# Patient Record
Sex: FEMALE | Race: WHITE | NOT HISPANIC OR LATINO
[De-identification: names, ages, dates, MRNs, and addresses within clinical notes are randomized per-mention and may not be internally consistent; named-entity substitution may affect disease eponyms.]

---

## 2017-01-05 ENCOUNTER — APPOINTMENT (OUTPATIENT)
Dept: PSYCHIATRY | Facility: CLINIC | Age: 62
End: 2017-01-05

## 2017-01-12 ENCOUNTER — APPOINTMENT (OUTPATIENT)
Dept: PSYCHIATRY | Facility: CLINIC | Age: 62
End: 2017-01-12

## 2017-01-23 ENCOUNTER — RX RENEWAL (OUTPATIENT)
Age: 62
End: 2017-01-23

## 2017-01-26 ENCOUNTER — APPOINTMENT (OUTPATIENT)
Dept: PSYCHIATRY | Facility: CLINIC | Age: 62
End: 2017-01-26

## 2017-02-02 ENCOUNTER — APPOINTMENT (OUTPATIENT)
Dept: PSYCHIATRY | Facility: CLINIC | Age: 62
End: 2017-02-02

## 2017-02-09 ENCOUNTER — APPOINTMENT (OUTPATIENT)
Dept: PSYCHIATRY | Facility: CLINIC | Age: 62
End: 2017-02-09

## 2017-02-16 ENCOUNTER — APPOINTMENT (OUTPATIENT)
Dept: PSYCHIATRY | Facility: CLINIC | Age: 62
End: 2017-02-16

## 2017-03-02 ENCOUNTER — APPOINTMENT (OUTPATIENT)
Dept: PSYCHIATRY | Facility: CLINIC | Age: 62
End: 2017-03-02

## 2017-03-09 ENCOUNTER — APPOINTMENT (OUTPATIENT)
Dept: PSYCHIATRY | Facility: CLINIC | Age: 62
End: 2017-03-09

## 2017-03-16 ENCOUNTER — APPOINTMENT (OUTPATIENT)
Dept: PSYCHIATRY | Facility: CLINIC | Age: 62
End: 2017-03-16

## 2017-03-30 ENCOUNTER — APPOINTMENT (OUTPATIENT)
Dept: PSYCHIATRY | Facility: CLINIC | Age: 62
End: 2017-03-30

## 2017-03-31 ENCOUNTER — APPOINTMENT (OUTPATIENT)
Dept: ULTRASOUND IMAGING | Facility: CLINIC | Age: 62
End: 2017-03-31

## 2017-03-31 ENCOUNTER — OUTPATIENT (OUTPATIENT)
Dept: OUTPATIENT SERVICES | Facility: HOSPITAL | Age: 62
LOS: 1 days | End: 2017-03-31
Payer: COMMERCIAL

## 2017-03-31 DIAGNOSIS — R19.4 CHANGE IN BOWEL HABIT: ICD-10-CM

## 2017-03-31 DIAGNOSIS — J20.9 ACUTE BRONCHITIS, UNSPECIFIED: ICD-10-CM

## 2017-03-31 DIAGNOSIS — K30 FUNCTIONAL DYSPEPSIA: ICD-10-CM

## 2017-03-31 PROCEDURE — 76700 US EXAM ABDOM COMPLETE: CPT

## 2017-04-14 ENCOUNTER — OUTPATIENT (OUTPATIENT)
Dept: OUTPATIENT SERVICES | Facility: HOSPITAL | Age: 62
LOS: 1 days | End: 2017-04-14
Payer: COMMERCIAL

## 2017-04-14 ENCOUNTER — APPOINTMENT (OUTPATIENT)
Dept: CT IMAGING | Facility: CLINIC | Age: 62
End: 2017-04-14

## 2017-04-14 DIAGNOSIS — R19.4 CHANGE IN BOWEL HABIT: ICD-10-CM

## 2017-04-14 PROCEDURE — 74150 CT ABDOMEN W/O CONTRAST: CPT

## 2017-04-20 ENCOUNTER — APPOINTMENT (OUTPATIENT)
Dept: PSYCHIATRY | Facility: CLINIC | Age: 62
End: 2017-04-20

## 2017-04-26 ENCOUNTER — RESULT REVIEW (OUTPATIENT)
Age: 62
End: 2017-04-26

## 2017-04-27 ENCOUNTER — RESULT REVIEW (OUTPATIENT)
Age: 62
End: 2017-04-27

## 2017-04-27 ENCOUNTER — APPOINTMENT (OUTPATIENT)
Dept: PSYCHIATRY | Facility: CLINIC | Age: 62
End: 2017-04-27

## 2017-05-04 ENCOUNTER — APPOINTMENT (OUTPATIENT)
Dept: PSYCHIATRY | Facility: CLINIC | Age: 62
End: 2017-05-04

## 2017-05-08 ENCOUNTER — RX RENEWAL (OUTPATIENT)
Age: 62
End: 2017-05-08

## 2017-05-11 ENCOUNTER — APPOINTMENT (OUTPATIENT)
Dept: PSYCHIATRY | Facility: CLINIC | Age: 62
End: 2017-05-11

## 2017-05-11 DIAGNOSIS — Z91.19 PATIENT'S NONCOMPLIANCE WITH OTHER MEDICAL TREATMENT AND REGIMEN: ICD-10-CM

## 2017-05-18 ENCOUNTER — APPOINTMENT (OUTPATIENT)
Dept: PSYCHIATRY | Facility: CLINIC | Age: 62
End: 2017-05-18

## 2017-05-23 ENCOUNTER — OUTPATIENT (OUTPATIENT)
Dept: OUTPATIENT SERVICES | Facility: HOSPITAL | Age: 62
LOS: 1 days | End: 2017-05-23
Payer: COMMERCIAL

## 2017-05-23 ENCOUNTER — APPOINTMENT (OUTPATIENT)
Dept: MRI IMAGING | Facility: CLINIC | Age: 62
End: 2017-05-23

## 2017-05-23 DIAGNOSIS — Z00.8 ENCOUNTER FOR OTHER GENERAL EXAMINATION: ICD-10-CM

## 2017-05-23 PROCEDURE — 82565 ASSAY OF CREATININE: CPT

## 2017-05-25 ENCOUNTER — APPOINTMENT (OUTPATIENT)
Dept: PSYCHIATRY | Facility: CLINIC | Age: 62
End: 2017-05-25

## 2017-05-31 ENCOUNTER — APPOINTMENT (OUTPATIENT)
Dept: MRI IMAGING | Facility: CLINIC | Age: 62
End: 2017-05-31

## 2017-05-31 ENCOUNTER — OUTPATIENT (OUTPATIENT)
Dept: OUTPATIENT SERVICES | Facility: HOSPITAL | Age: 62
LOS: 1 days | End: 2017-05-31
Payer: COMMERCIAL

## 2017-05-31 DIAGNOSIS — Z00.8 ENCOUNTER FOR OTHER GENERAL EXAMINATION: ICD-10-CM

## 2017-05-31 PROCEDURE — A9585: CPT

## 2017-05-31 PROCEDURE — 74183 MRI ABD W/O CNTR FLWD CNTR: CPT

## 2017-06-15 ENCOUNTER — APPOINTMENT (OUTPATIENT)
Dept: PSYCHIATRY | Facility: CLINIC | Age: 62
End: 2017-06-15

## 2017-06-22 ENCOUNTER — APPOINTMENT (OUTPATIENT)
Dept: PSYCHIATRY | Facility: CLINIC | Age: 62
End: 2017-06-22

## 2017-07-06 ENCOUNTER — APPOINTMENT (OUTPATIENT)
Dept: PSYCHIATRY | Facility: CLINIC | Age: 62
End: 2017-07-06

## 2017-08-03 ENCOUNTER — APPOINTMENT (OUTPATIENT)
Dept: PSYCHIATRY | Facility: CLINIC | Age: 62
End: 2017-08-03

## 2017-08-10 ENCOUNTER — APPOINTMENT (OUTPATIENT)
Dept: PSYCHIATRY | Facility: CLINIC | Age: 62
End: 2017-08-10

## 2017-09-01 ENCOUNTER — RX RENEWAL (OUTPATIENT)
Age: 62
End: 2017-09-01

## 2017-09-14 ENCOUNTER — APPOINTMENT (OUTPATIENT)
Dept: PSYCHIATRY | Facility: CLINIC | Age: 62
End: 2017-09-14
Payer: COMMERCIAL

## 2017-09-14 DIAGNOSIS — F31.9 BIPOLAR DISORDER, UNSPECIFIED: ICD-10-CM

## 2017-09-14 PROCEDURE — 99214 OFFICE O/P EST MOD 30 MIN: CPT

## 2017-10-13 ENCOUNTER — APPOINTMENT (OUTPATIENT)
Dept: MAMMOGRAPHY | Facility: CLINIC | Age: 62
End: 2017-10-13

## 2017-10-13 ENCOUNTER — APPOINTMENT (OUTPATIENT)
Dept: CT IMAGING | Facility: CLINIC | Age: 62
End: 2017-10-13

## 2017-10-13 ENCOUNTER — OUTPATIENT (OUTPATIENT)
Dept: OUTPATIENT SERVICES | Facility: HOSPITAL | Age: 62
LOS: 1 days | End: 2017-10-13
Payer: COMMERCIAL

## 2017-10-13 DIAGNOSIS — Z00.8 ENCOUNTER FOR OTHER GENERAL EXAMINATION: ICD-10-CM

## 2017-10-13 PROCEDURE — G0297: CPT

## 2017-10-13 PROCEDURE — G0297: CPT | Mod: 26

## 2017-12-04 ENCOUNTER — APPOINTMENT (OUTPATIENT)
Dept: MRI IMAGING | Facility: CLINIC | Age: 62
End: 2017-12-04

## 2017-12-04 ENCOUNTER — APPOINTMENT (OUTPATIENT)
Dept: MAMMOGRAPHY | Facility: CLINIC | Age: 62
End: 2017-12-04

## 2017-12-04 ENCOUNTER — OUTPATIENT (OUTPATIENT)
Dept: OUTPATIENT SERVICES | Facility: HOSPITAL | Age: 62
LOS: 1 days | End: 2017-12-04
Payer: COMMERCIAL

## 2017-12-04 ENCOUNTER — APPOINTMENT (OUTPATIENT)
Dept: ULTRASOUND IMAGING | Facility: CLINIC | Age: 62
End: 2017-12-04

## 2017-12-04 DIAGNOSIS — Z00.00 ENCOUNTER FOR GENERAL ADULT MEDICAL EXAMINATION WITHOUT ABNORMAL FINDINGS: ICD-10-CM

## 2017-12-04 PROCEDURE — 77063 BREAST TOMOSYNTHESIS BI: CPT

## 2017-12-04 PROCEDURE — 77063 BREAST TOMOSYNTHESIS BI: CPT | Mod: 26

## 2017-12-04 PROCEDURE — 77067 SCR MAMMO BI INCL CAD: CPT

## 2017-12-04 PROCEDURE — 76641 ULTRASOUND BREAST COMPLETE: CPT | Mod: 26,50

## 2017-12-04 PROCEDURE — 70551 MRI BRAIN STEM W/O DYE: CPT | Mod: 26

## 2017-12-04 PROCEDURE — 70551 MRI BRAIN STEM W/O DYE: CPT

## 2017-12-04 PROCEDURE — G0202: CPT | Mod: 26

## 2017-12-04 PROCEDURE — 76641 ULTRASOUND BREAST COMPLETE: CPT

## 2018-07-25 ENCOUNTER — APPOINTMENT (OUTPATIENT)
Dept: PSYCHIATRY | Facility: CLINIC | Age: 63
End: 2018-07-25

## 2018-08-30 ENCOUNTER — APPOINTMENT (OUTPATIENT)
Dept: PSYCHIATRY | Facility: CLINIC | Age: 63
End: 2018-08-30
Payer: COMMERCIAL

## 2018-08-30 PROCEDURE — 99215 OFFICE O/P EST HI 40 MIN: CPT

## 2018-09-18 ENCOUNTER — APPOINTMENT (OUTPATIENT)
Dept: PSYCHIATRY | Facility: CLINIC | Age: 63
End: 2018-09-18
Payer: COMMERCIAL

## 2018-09-18 PROCEDURE — 99214 OFFICE O/P EST MOD 30 MIN: CPT

## 2018-10-03 ENCOUNTER — INPATIENT (INPATIENT)
Facility: HOSPITAL | Age: 63
LOS: 15 days | Discharge: HOME | DRG: 872 | End: 2018-10-19
Attending: INTERNAL MEDICINE | Admitting: INTERNAL MEDICINE
Payer: COMMERCIAL

## 2018-10-03 VITALS
HEART RATE: 82 BPM | HEIGHT: 70 IN | SYSTOLIC BLOOD PRESSURE: 96 MMHG | OXYGEN SATURATION: 100 % | RESPIRATION RATE: 17 BRPM | TEMPERATURE: 98 F | DIASTOLIC BLOOD PRESSURE: 63 MMHG | WEIGHT: 119.05 LBS

## 2018-10-03 DIAGNOSIS — N12 TUBULO-INTERSTITIAL NEPHRITIS, NOT SPECIFIED AS ACUTE OR CHRONIC: ICD-10-CM

## 2018-10-03 LAB
ALBUMIN SERPL ELPH-MCNC: 3.5 G/DL — SIGNIFICANT CHANGE UP (ref 3.3–5)
ALP SERPL-CCNC: 63 U/L — SIGNIFICANT CHANGE UP (ref 40–120)
ALT FLD-CCNC: 7 U/L — LOW (ref 10–45)
ANION GAP SERPL CALC-SCNC: 9 MMOL/L — SIGNIFICANT CHANGE UP (ref 5–17)
APPEARANCE UR: CLEAR — SIGNIFICANT CHANGE UP
APTT BLD: 33.8 SEC — SIGNIFICANT CHANGE UP (ref 27.5–37.4)
AST SERPL-CCNC: 6 U/L — LOW (ref 10–40)
BACTERIA # UR AUTO: ABNORMAL
BASOPHILS # BLD AUTO: 0.1 K/UL — SIGNIFICANT CHANGE UP (ref 0–0.2)
BASOPHILS NFR BLD AUTO: 0.8 % — SIGNIFICANT CHANGE UP (ref 0–2)
BILIRUB SERPL-MCNC: 0.2 MG/DL — SIGNIFICANT CHANGE UP (ref 0.2–1.2)
BILIRUB UR-MCNC: NEGATIVE — SIGNIFICANT CHANGE UP
BUN SERPL-MCNC: 12 MG/DL — SIGNIFICANT CHANGE UP (ref 7–23)
CALCIUM SERPL-MCNC: 9.1 MG/DL — SIGNIFICANT CHANGE UP (ref 8.4–10.5)
CHLORIDE SERPL-SCNC: 103 MMOL/L — SIGNIFICANT CHANGE UP (ref 96–108)
CO2 SERPL-SCNC: 28 MMOL/L — SIGNIFICANT CHANGE UP (ref 22–31)
COLOR SPEC: SIGNIFICANT CHANGE UP
CREAT SERPL-MCNC: 0.86 MG/DL — SIGNIFICANT CHANGE UP (ref 0.5–1.3)
DIFF PNL FLD: NEGATIVE — SIGNIFICANT CHANGE UP
EOSINOPHIL # BLD AUTO: 0 K/UL — SIGNIFICANT CHANGE UP (ref 0–0.5)
EOSINOPHIL NFR BLD AUTO: 0.6 % — SIGNIFICANT CHANGE UP (ref 0–6)
EPI CELLS # UR: 2 /HPF — SIGNIFICANT CHANGE UP
GAS PNL BLDV: SIGNIFICANT CHANGE UP
GLUCOSE SERPL-MCNC: 105 MG/DL — HIGH (ref 70–99)
GLUCOSE UR QL: NEGATIVE — SIGNIFICANT CHANGE UP
HCT VFR BLD CALC: 36.8 % — SIGNIFICANT CHANGE UP (ref 34.5–45)
HGB BLD-MCNC: 12.2 G/DL — SIGNIFICANT CHANGE UP (ref 11.5–15.5)
HYALINE CASTS # UR AUTO: 1 /LPF — SIGNIFICANT CHANGE UP (ref 0–2)
INR BLD: 1.42 RATIO — HIGH (ref 0.88–1.16)
KETONES UR-MCNC: NEGATIVE — SIGNIFICANT CHANGE UP
LEUKOCYTE ESTERASE UR-ACNC: ABNORMAL
LYMPHOCYTES # BLD AUTO: 1.8 K/UL — SIGNIFICANT CHANGE UP (ref 1–3.3)
LYMPHOCYTES # BLD AUTO: 23 % — SIGNIFICANT CHANGE UP (ref 13–44)
MCHC RBC-ENTMCNC: 29.4 PG — SIGNIFICANT CHANGE UP (ref 27–34)
MCHC RBC-ENTMCNC: 33.2 GM/DL — SIGNIFICANT CHANGE UP (ref 32–36)
MCV RBC AUTO: 88.4 FL — SIGNIFICANT CHANGE UP (ref 80–100)
MONOCYTES # BLD AUTO: 0.8 K/UL — SIGNIFICANT CHANGE UP (ref 0–0.9)
MONOCYTES NFR BLD AUTO: 10.5 % — SIGNIFICANT CHANGE UP (ref 2–14)
NEUTROPHILS # BLD AUTO: 5.1 K/UL — SIGNIFICANT CHANGE UP (ref 1.8–7.4)
NEUTROPHILS NFR BLD AUTO: 65.2 % — SIGNIFICANT CHANGE UP (ref 43–77)
NITRITE UR-MCNC: POSITIVE
PH UR: 7 — SIGNIFICANT CHANGE UP (ref 5–8)
PLATELET # BLD AUTO: 337 K/UL — SIGNIFICANT CHANGE UP (ref 150–400)
POTASSIUM SERPL-MCNC: 3.6 MMOL/L — SIGNIFICANT CHANGE UP (ref 3.5–5.3)
POTASSIUM SERPL-SCNC: 3.6 MMOL/L — SIGNIFICANT CHANGE UP (ref 3.5–5.3)
PROT SERPL-MCNC: 7.1 G/DL — SIGNIFICANT CHANGE UP (ref 6–8.3)
PROT UR-MCNC: NEGATIVE — SIGNIFICANT CHANGE UP
PROTHROM AB SERPL-ACNC: 15.4 SEC — HIGH (ref 9.8–12.7)
RAPID RVP RESULT: SIGNIFICANT CHANGE UP
RBC # BLD: 4.16 M/UL — SIGNIFICANT CHANGE UP (ref 3.8–5.2)
RBC # FLD: 12.2 % — SIGNIFICANT CHANGE UP (ref 10.3–14.5)
RBC CASTS # UR COMP ASSIST: 4 /HPF — SIGNIFICANT CHANGE UP (ref 0–4)
SODIUM SERPL-SCNC: 140 MMOL/L — SIGNIFICANT CHANGE UP (ref 135–145)
SP GR SPEC: 1.03 — HIGH (ref 1.01–1.02)
UROBILINOGEN FLD QL: NEGATIVE — SIGNIFICANT CHANGE UP
WBC # BLD: 7.8 K/UL — SIGNIFICANT CHANGE UP (ref 3.8–10.5)
WBC # FLD AUTO: 7.8 K/UL — SIGNIFICANT CHANGE UP (ref 3.8–10.5)
WBC UR QL: 39 /HPF — HIGH (ref 0–5)

## 2018-10-03 PROCEDURE — 99285 EMERGENCY DEPT VISIT HI MDM: CPT

## 2018-10-03 PROCEDURE — 71260 CT THORAX DX C+: CPT | Mod: 26

## 2018-10-03 PROCEDURE — 74177 CT ABD & PELVIS W/CONTRAST: CPT | Mod: 26

## 2018-10-03 RX ORDER — HEPARIN SODIUM 5000 [USP'U]/ML
5000 INJECTION INTRAVENOUS; SUBCUTANEOUS EVERY 12 HOURS
Qty: 0 | Refills: 0 | Status: DISCONTINUED | OUTPATIENT
Start: 2018-10-03 | End: 2018-10-19

## 2018-10-03 RX ORDER — SODIUM CHLORIDE 9 MG/ML
1000 INJECTION INTRAMUSCULAR; INTRAVENOUS; SUBCUTANEOUS ONCE
Qty: 0 | Refills: 0 | Status: COMPLETED | OUTPATIENT
Start: 2018-10-03 | End: 2018-10-03

## 2018-10-03 RX ORDER — ESCITALOPRAM OXALATE 10 MG/1
30 TABLET, FILM COATED ORAL DAILY
Qty: 0 | Refills: 0 | Status: DISCONTINUED | OUTPATIENT
Start: 2018-10-03 | End: 2018-10-19

## 2018-10-03 RX ORDER — CEFTRIAXONE 500 MG/1
1 INJECTION, POWDER, FOR SOLUTION INTRAMUSCULAR; INTRAVENOUS ONCE
Qty: 0 | Refills: 0 | Status: COMPLETED | OUTPATIENT
Start: 2018-10-03 | End: 2018-10-03

## 2018-10-03 RX ORDER — SODIUM CHLORIDE 9 MG/ML
1000 INJECTION INTRAMUSCULAR; INTRAVENOUS; SUBCUTANEOUS
Qty: 0 | Refills: 0 | Status: DISCONTINUED | OUTPATIENT
Start: 2018-10-03 | End: 2018-10-09

## 2018-10-03 RX ADMIN — ESCITALOPRAM OXALATE 30 MILLIGRAM(S): 10 TABLET, FILM COATED ORAL at 20:55

## 2018-10-03 RX ADMIN — SODIUM CHLORIDE 2000 MILLILITER(S): 9 INJECTION INTRAMUSCULAR; INTRAVENOUS; SUBCUTANEOUS at 17:55

## 2018-10-03 RX ADMIN — SODIUM CHLORIDE 100 MILLILITER(S): 9 INJECTION INTRAMUSCULAR; INTRAVENOUS; SUBCUTANEOUS at 20:55

## 2018-10-03 RX ADMIN — CEFTRIAXONE 100 GRAM(S): 500 INJECTION, POWDER, FOR SOLUTION INTRAMUSCULAR; INTRAVENOUS at 20:55

## 2018-10-03 NOTE — ED ADULT NURSE NOTE - NSIMPLEMENTINTERV_GEN_ALL_ED
Implemented All Universal Safety Interventions:  [place] to call system. Call bell, personal items and telephone within reach. Instruct patient to call for assistance. Room bathroom lighting operational. Non-slip footwear when patient is off stretcher. Physically safe environment: no spills, clutter or unnecessary equipment. Stretcher in lowest position, wheels locked, appropriate side rails in place.

## 2018-10-03 NOTE — ED CLERICAL - NS ED CLERK NOTE PRE-ARRIVAL INFORMATION; ADDITIONAL PRE-ARRIVAL INFORMATION
CC/Reason For referral:  SOB, HYPOTENSIVE  Preferred Consultant(if applicable):  Who admits for you (if needed):  Do you have documents you would like to fax over? NO  Would you still like to speak to an ED attending?  YES

## 2018-10-03 NOTE — ED PROVIDER NOTE - SHIFT CHANGE DETAILS
Attending MD King: 63F with PMH anxiety with 8 days of malaise, PMD Dr Merritt, hypotensive in his office, sent in for admission by Dr Berkowitz.  H&P by Dr Berkowitz.  Awaiting only CT Chest/A/P prior to admission.

## 2018-10-03 NOTE — ED ADULT NURSE NOTE - OBJECTIVE STATEMENT
Patient is a 63 year old female complaining of sob, low bp and unsteadiness. Arrived by walk-in. Patient has history of anxiety, denies other medical hx. She is a smoker. Patient is A&O x3 and appears well. Pt reports feeling off for one week with suspected sinus infection, got body aches and diaphoresis during the night with associated thirst and sob. Endorsing complaints of burning urination and urinating on herself due to urgency. Pt then developed a headache and dizziness yesterday and went to see PCP. Denies complaints of chest pain, cough, known fevers, chills, n/v/d, syncope, blood in urine, blood in stool. Abd is soft, non tender, non distended. Skin is warm and dry. Color is consistent with ethnicity. VSS/ NAD. Safety and comfort maintained. Nobody at the bedside. Will continue to monitor.

## 2018-10-03 NOTE — ED ADULT NURSE NOTE - CHIEF COMPLAINT QUOTE
SOB, dizziness, back pain and weakness x 1.5 week. felt like she had the flu went to see her PMD today and he sent her here for evaluation for SOB, sent to be admitted to [facility]

## 2018-10-03 NOTE — H&P ADULT - HISTORY OF PRESENT ILLNESS
63y femalewho comes to ed complains of feeling ill past week with body aches, sweats, chills di/no cough, nausea without vomiting.   Diarrhea last week with watery diarrhea. No gi bleeding. Pt symptoms worsened over past few days.  . Pt had syncope yesterday  . Seen today at pmd and found profoundly hypotensive. Referred to ed

## 2018-10-03 NOTE — ED PROVIDER NOTE - OBJECTIVE STATEMENT
62 yo female PMHx anxiety presents to ED c/o generalized fatigue, body aches, chills and "feeling unwell" for the last week. Additionally endorses several episodes of nausea with nb/nb vomiting this week as well as watery diarrhea with abdominal cramping. Symptoms progressively worsening this past week. Patient went to her PMD today who found her to be hypotensive and send her in to ED for workup and admission. Denies chest pain, cough, shortness of breath, recent travel/hospitalization, LE swelling, calf pain, melena, BRBPR, recent sick contacts, dizziness, neck pain.

## 2018-10-03 NOTE — ED PROVIDER NOTE - PROGRESS NOTE DETAILS
Discussed with Dr Berkowitz bedside TBA  Pankaj Gibson MD, Facep Discussed results of CT's with Dr. Berkowitz. CTAP with findings c/w pyelo vs ischemia vs infiltrative process. UA c/w infection. He recommended Ceftriaxone for pyelo and admission under his service. - Jorden Mendoza PA-C Attending MD King: Case discussed with Dr Berkowitz by FRANCES Mendoza.   CT results: heterogeneous enhancement of the kidneys bilaterally. In addition, there is mild enhancement of the urothelium in the proximal right ureter.The differential diagnosis includes infection such as pyelonephritis, ischemia, and infiltrative processes such as lymphoma and leukemia.

## 2018-10-03 NOTE — H&P ADULT - ASSESSMENT
pt w/ weakness / syncope/ hypotension chills/cvat / abd tenderness  ct c/a/p  cbc  cmp  iv fluids  dvt proph   r/o sepsis  cultures

## 2018-10-03 NOTE — ED ADULT TRIAGE NOTE - CHIEF COMPLAINT QUOTE
SOB, dizziness, back pain and weakness x 1.5 week. felt like she had the flu went to see her PMD today and he sent her here for evaluation for SOB, sent to be admitted to [facility]

## 2018-10-03 NOTE — ED PROVIDER NOTE - ATTENDING CONTRIBUTION TO CARE
Private Physician Nishant Lomeli  63y female pmh axinety on gabapentin as sleep aid, Lexapro, Pt comes to ed complains of feeling ill past 8 days with body aches, sweats, chills didn't take temp,no cough, nausea without vomiting. Diarrhea last week with watery diarrhea. No gi bleeding. Pt symptoms worsened over past few days. Usual bp is 90/60 . Pt had syncope yesterday. Seen today at pmd and found profoundly hypotensive. Referred to ed PE Adult female thin looking mildly acutely ill, NCAT chest clear anterior & posterior abd mild gen ttp no rebound guarding. +preet cvat cv no rubs, gallops or murmurs, neruo no focal defects  Pankaj Gibson MD, Facep Private Physician Nishant Lomeli    63y female  pmh axinety on gabapentin as sleep aid, Lexapro, Pt comes to ed complains of feeling ill past 8 days with body aches, sweats, chills didn't take temp,no cough, nausea without vomiting. Diarrhea last week with watery diarrhea. No gi bleeding. Pt symptoms worsened over past few days. Usual bp is 90/60 . Pt had syncope yesterday. Seen today at pmd and found profoundly hypotensive. Referred to ed PE Adult female thin looking mildly acutely ill, NCAT chest clear anterior & posterior abd mild gen ttp no rebound guarding. +preet cvat cv no rubs, gallops or murmurs, neruo no focal defects  Pankaj Gibson MD, Facep

## 2018-10-04 LAB
ALBUMIN SERPL ELPH-MCNC: 3.2 G/DL — LOW (ref 3.3–5)
ALP SERPL-CCNC: 54 U/L — SIGNIFICANT CHANGE UP (ref 40–120)
ALT FLD-CCNC: 6 U/L — LOW (ref 10–45)
ANION GAP SERPL CALC-SCNC: 8 MMOL/L — SIGNIFICANT CHANGE UP (ref 5–17)
AST SERPL-CCNC: <5 U/L — LOW (ref 10–40)
BILIRUB DIRECT SERPL-MCNC: <0.1 MG/DL — SIGNIFICANT CHANGE UP (ref 0–0.2)
BILIRUB INDIRECT FLD-MCNC: >0.1 MG/DL — LOW (ref 0.2–1)
BILIRUB SERPL-MCNC: 0.2 MG/DL — SIGNIFICANT CHANGE UP (ref 0.2–1.2)
BUN SERPL-MCNC: 8 MG/DL — SIGNIFICANT CHANGE UP (ref 7–23)
CALCIUM SERPL-MCNC: 8.8 MG/DL — SIGNIFICANT CHANGE UP (ref 8.4–10.5)
CHLORIDE SERPL-SCNC: 104 MMOL/L — SIGNIFICANT CHANGE UP (ref 96–108)
CK MB CFR SERPL CALC: 1 NG/ML — SIGNIFICANT CHANGE UP (ref 0–3.8)
CK SERPL-CCNC: 30 U/L — SIGNIFICANT CHANGE UP (ref 25–170)
CO2 SERPL-SCNC: 25 MMOL/L — SIGNIFICANT CHANGE UP (ref 22–31)
CREAT SERPL-MCNC: 0.75 MG/DL — SIGNIFICANT CHANGE UP (ref 0.5–1.3)
GLUCOSE SERPL-MCNC: 91 MG/DL — SIGNIFICANT CHANGE UP (ref 70–99)
HCT VFR BLD CALC: 33.3 % — LOW (ref 34.5–45)
HCT VFR BLD CALC: 33.9 % — LOW (ref 34.5–45)
HGB BLD-MCNC: 10.7 G/DL — LOW (ref 11.5–15.5)
HGB BLD-MCNC: 11.4 G/DL — LOW (ref 11.5–15.5)
MAGNESIUM SERPL-MCNC: 2.1 MG/DL — SIGNIFICANT CHANGE UP (ref 1.6–2.6)
MCHC RBC-ENTMCNC: 28.5 PG — SIGNIFICANT CHANGE UP (ref 27–34)
MCHC RBC-ENTMCNC: 29.6 PG — SIGNIFICANT CHANGE UP (ref 27–34)
MCHC RBC-ENTMCNC: 32.1 GM/DL — SIGNIFICANT CHANGE UP (ref 32–36)
MCHC RBC-ENTMCNC: 33.7 GM/DL — SIGNIFICANT CHANGE UP (ref 32–36)
MCV RBC AUTO: 87.9 FL — SIGNIFICANT CHANGE UP (ref 80–100)
MCV RBC AUTO: 88.6 FL — SIGNIFICANT CHANGE UP (ref 80–100)
PHOSPHATE SERPL-MCNC: 3.8 MG/DL — SIGNIFICANT CHANGE UP (ref 2.5–4.5)
PLATELET # BLD AUTO: 326 K/UL — SIGNIFICANT CHANGE UP (ref 150–400)
PLATELET # BLD AUTO: 337 K/UL — SIGNIFICANT CHANGE UP (ref 150–400)
POTASSIUM SERPL-MCNC: 3.7 MMOL/L — SIGNIFICANT CHANGE UP (ref 3.5–5.3)
POTASSIUM SERPL-SCNC: 3.7 MMOL/L — SIGNIFICANT CHANGE UP (ref 3.5–5.3)
PROT SERPL-MCNC: 6.4 G/DL — SIGNIFICANT CHANGE UP (ref 6–8.3)
RBC # BLD: 3.76 M/UL — LOW (ref 3.8–5.2)
RBC # BLD: 3.86 M/UL — SIGNIFICANT CHANGE UP (ref 3.8–5.2)
RBC # FLD: 11.8 % — SIGNIFICANT CHANGE UP (ref 10.3–14.5)
RBC # FLD: 13.6 % — SIGNIFICANT CHANGE UP (ref 10.3–14.5)
SODIUM SERPL-SCNC: 137 MMOL/L — SIGNIFICANT CHANGE UP (ref 135–145)
TROPONIN T, HIGH SENSITIVITY RESULT: <6 NG/L — SIGNIFICANT CHANGE UP (ref 0–51)
WBC # BLD: 5.9 K/UL — SIGNIFICANT CHANGE UP (ref 3.8–10.5)
WBC # BLD: 6.53 K/UL — SIGNIFICANT CHANGE UP (ref 3.8–10.5)
WBC # FLD AUTO: 5.9 K/UL — SIGNIFICANT CHANGE UP (ref 3.8–10.5)
WBC # FLD AUTO: 6.53 K/UL — SIGNIFICANT CHANGE UP (ref 3.8–10.5)

## 2018-10-04 PROCEDURE — 99254 IP/OBS CNSLTJ NEW/EST MOD 60: CPT

## 2018-10-04 PROCEDURE — 70450 CT HEAD/BRAIN W/O DYE: CPT | Mod: 26

## 2018-10-04 RX ORDER — ACETAMINOPHEN 500 MG
650 TABLET ORAL ONCE
Qty: 0 | Refills: 0 | Status: COMPLETED | OUTPATIENT
Start: 2018-10-04 | End: 2018-10-04

## 2018-10-04 RX ORDER — SODIUM CHLORIDE 9 MG/ML
1000 INJECTION INTRAMUSCULAR; INTRAVENOUS; SUBCUTANEOUS
Qty: 0 | Refills: 0 | Status: DISCONTINUED | OUTPATIENT
Start: 2018-10-04 | End: 2018-10-04

## 2018-10-04 RX ORDER — POTASSIUM CHLORIDE 20 MEQ
40 PACKET (EA) ORAL ONCE
Qty: 0 | Refills: 0 | Status: COMPLETED | OUTPATIENT
Start: 2018-10-04 | End: 2018-10-04

## 2018-10-04 RX ORDER — MECLIZINE HCL 12.5 MG
12.5 TABLET ORAL EVERY 8 HOURS
Qty: 0 | Refills: 0 | Status: DISCONTINUED | OUTPATIENT
Start: 2018-10-04 | End: 2018-10-19

## 2018-10-04 RX ORDER — ONDANSETRON 8 MG/1
4 TABLET, FILM COATED ORAL ONCE
Qty: 0 | Refills: 0 | Status: COMPLETED | OUTPATIENT
Start: 2018-10-04 | End: 2018-10-04

## 2018-10-04 RX ORDER — CEFTRIAXONE 500 MG/1
1 INJECTION, POWDER, FOR SOLUTION INTRAMUSCULAR; INTRAVENOUS EVERY 12 HOURS
Qty: 0 | Refills: 0 | Status: DISCONTINUED | OUTPATIENT
Start: 2018-10-04 | End: 2018-10-09

## 2018-10-04 RX ADMIN — Medication 650 MILLIGRAM(S): at 08:49

## 2018-10-04 RX ADMIN — Medication 40 MILLIEQUIVALENT(S): at 13:19

## 2018-10-04 RX ADMIN — CEFTRIAXONE 100 GRAM(S): 500 INJECTION, POWDER, FOR SOLUTION INTRAMUSCULAR; INTRAVENOUS at 17:06

## 2018-10-04 RX ADMIN — ESCITALOPRAM OXALATE 30 MILLIGRAM(S): 10 TABLET, FILM COATED ORAL at 13:19

## 2018-10-04 RX ADMIN — SODIUM CHLORIDE 100 MILLILITER(S): 9 INJECTION INTRAMUSCULAR; INTRAVENOUS; SUBCUTANEOUS at 08:51

## 2018-10-04 RX ADMIN — ONDANSETRON 4 MILLIGRAM(S): 8 TABLET, FILM COATED ORAL at 08:49

## 2018-10-04 RX ADMIN — Medication 12.5 MILLIGRAM(S): at 19:34

## 2018-10-04 RX ADMIN — HEPARIN SODIUM 5000 UNIT(S): 5000 INJECTION INTRAVENOUS; SUBCUTANEOUS at 17:09

## 2018-10-04 RX ADMIN — Medication 650 MILLIGRAM(S): at 09:30

## 2018-10-04 NOTE — CONSULT NOTE ADULT - SUBJECTIVE AND OBJECTIVE BOX
CHIEF COMPLAINT:  fever, weakness, back pain dizziness  HISTORY OF PRESENT ILLNESS:  HPI:  63y femalewho comes to ed complains of feeling ill past week with body aches, sweats, chills di/no cough, nausea without vomiting.   Diarrhea last week with watery diarrhea. No gi bleeding. Pt symptoms worsened over past few days.  . Pt had syncope yesterday  . Seen today at pmd and found profoundly hypotensive. Referred to ed (03 Oct 2018 16:38)    this morning patient quite anxious complaining of left flank  pain, feels weak and dizzy.      MEDICATIONS:  heparin  Injectable 5000 Unit(s) SubCutaneous every 12 hours    cefTRIAXone   IVPB 1 Gram(s) IV Intermittent every 12 hours      escitalopram 30 milliGRAM(s) Oral daily  meclizine 12.5 milliGRAM(s) Oral every 8 hours PRN        sodium chloride 0.9%. 1000 milliLiter(s) IV Continuous <Continuous>      FAMILY HISTORY:      Allergies    e-mycin,tetracycline,other antibiotic (Rash)  erythromycin (Rash)    Intolerances    	    PHYSICAL EXAM:  T(C): 36.7 (10-04-18 @ 16:10), Max: 36.8 (10-04-18 @ 05:38)  HR: 50 (10-04-18 @ 16:10) (50 - 92)  BP: 124/73 (10-04-18 @ 16:10) (104/67 - 124/73)  RR: 18 (10-04-18 @ 16:10) (16 - 20)  SpO2: 98% (10-04-18 @ 16:10) (94% - 100%)  Wt(kg): --  I&O's Summary    04 Oct 2018 07:01  -  04 Oct 2018 19:55  --------------------------------------------------------  IN: 1200 mL / OUT: 0 mL / NET: 1200 mL        Appearance: Normal anxious uncomfortable complaining of left flank pain	  HEENT:   Normal oral mucosa, PERRL, EOMI	  Cardiovascular: Normal S1 S2, No JVD, No murmurs normal heart  Respiratory: Lungs clear to auscultation	  Psychiatry: A & O x 3, Mood & affect appropriate  Gastrointestinal:  Soft, Non-tender, + BS	  Skin: No rashes, No ecchymoses, No cyanosis	  Neurologic: Non-focal  Extremities: No clubbing, cyanosis or edema  Vascular: Peripheral pulses palpable 2+ bilaterally    RADIOLOGY:  	< from: CT Abdomen and Pelvis w/ Oral Cont and w/ IV Cont (10.03.18 @ 18:20) >  CHEST:     LUNGS AND LARGE AIRWAYS: Mild debris in the trachea. No focal areas of   consolidation.  PLEURA: No pleural effusion.  VESSELS: Within normal limits.  HEART: Heart size is normal. Small pericardial effusion, slightly   increased since 10/13/2017.   MEDIASTINUM AND MARY: No lymphadenopathy.  CHEST WALL AND LOWER NECK: Within normal limits.    ABDOMEN AND PELVIS:    LIVER: A 0.4 cm cyst in the left lobe isunchanged since 4/14/2017.  BILE DUCTS: Normal caliber.  GALLBLADDER: Within normal limits.  SPLEEN: Within normal limits.  PANCREAS: Within normal limits.  ADRENALS: Within normal limits.  KIDNEYS/URETERS: No hydronephrosis. Heterogeneous enhancement of the   kidneys bilaterally, with peripheral, somewhat wedge-shaped, areas of   decreased attenuation. Mild enhancement of the urothelium in the proximal   right ureter. A subcentimeter hypodense lesion in the upper pole of the   right kidney is unchanged since prior studies.    BLADDER: Within normal limits.  REPRODUCTIVE ORGANS: The uterus and adnexa are within normal limits.    BOWEL: No bowel obstruction.   PERITONEUM: No ascites.  VESSELS:  Mild atherosclerotic disease.  RETROPERITONEUM: No lymphadenopathy.    ABDOMINAL WALL: Small fat-containing umbilical hernia.  BONES: Mild multilevel degenerative disease and scoliosis of the   thoracolumbar spine. A sclerotic lesion is noted. in the proximal left   femur.      IMPRESSION: Small pericardial effusion, slightly increased since   10/13/2017.    Heterogeneous enhancement of the kidneys bilaterally. In addition, there   is mild enhancement of the urothelium in the proximal right ureter.The   differential diagnosis includes infection such as pyelonephritis,   ischemia, and infiltrative processes such as lymphoma and leukemia.        < from: 12 Lead ECG (10.04.18 @ 09:59) >  Diagnosis Line SINUS BRADYCARDIA  SEPTAL INFARCT , AGE UNDETERMINED  ABNORMAL ECG    	  LABS:	 	    CARDIAC MARKERS:                                  11.4   5.9   )-----------( 326      ( 04 Oct 2018 12:11 )             33.9     10-04    137  |  104  |  8   ----------------------------<  91  3.7   |  25  |  0.75    Ca    8.8      04 Oct 2018 05:38  Phos  3.8     10-04  Mg     2.1     10-04    TPro  6.4  /  Alb  3.2<L>  /  TBili  0.2  /  DBili  <0.1  /  AST  <5<L>  /  ALT  6<L>  /  AlkPhos  54  10-04    proBNP:   Lipid Profile:   HgA1c:   TSH:

## 2018-10-04 NOTE — CONSULT NOTE ADULT - SUBJECTIVE AND OBJECTIVE BOX
Patient is a 63y old  Female who presents with a chief complaint of   From HPI" HPI:  63y femalewho comes to ed complains of feeling ill past week with body aches, sweats, chills di/no cough, nausea without vomiting.   Diarrhea last week with watery diarrhea. No gi bleeding. Pt symptoms worsened over past few days.  . Pt had syncope yesterday  . Seen today at pmd and found profoundly hypotensive. Referred to ed (03 Oct 2018 16:38)  "    Above verified-agree with above unless noted below.    ID consulted     PAST MEDICAL & SURGICAL HISTORY:  anxiety  Not clear if she has had UTIs in past-pt says not sure    Social history:   active   Smoking, occ   ETOH,  no   IVDU       FAMILY HISTORY:  - Family history of medical problems in all first degree relatives reviewed.None of these were found to be related to patients current illness.    REVIEW OF SYSTEMS  General:	+ malaise fatigue and  chills. Fevers subjective  Skin:No rash  	  Ophthalmologic:Denies any visual complaints,discharge redness or photophobia  	  ENMT:No nasal discharge,headache,sinus congestion or throat pain.No dental complaints    Respiratory and Thorax:No cough,sputum or chest pain.Denies shortness of breath  	  Cardiovascular:	No chest pain,palpitaions or dizziness    Gastrointestinal:	NO nausea,abdominal pain or diarrhea.    Genitourinary:	+ dysuria,no frequency. No flank pain    Musculoskeletal:	No joint swelling or pain.No weakness    Neurological:No confusion,+ diziness.No extremity weakness.No bladder or bowel incontinence	    Psychiatric:No delusions or hallucinations	    Hematology/Lymphatics:	No LN swelling.No gum bleeding     Endocrine:	No recent weight gain or loss.No abnormal heat/cold intolerance    Allergic/Immunologic:	No hives or rash   Allergies    e-mycin,tetracycline,other antibiotic (Rash)  erythromycin (Rash)    Intolerances        Antimicrobials:    cefTRIAXone   IVPB 1 Gram(s) IV Intermittent every 12 hours    Other medications reviewed      Vital Signs Last 24 Hrs  T(C): 36.7 (04 Oct 2018 11:59), Max: 36.8 (03 Oct 2018 14:11)  T(F): 98 (04 Oct 2018 11:59), Max: 98.3 (03 Oct 2018 14:11)  HR: 71 (04 Oct 2018 11:59) (63 - 92)  BP: 111/52 (04 Oct 2018 11:59) (96/63 - 116/80)  BP(mean): --  RR: 18 (04 Oct 2018 11:59) (17 - 20)  SpO2: 100% (04 Oct 2018 11:59) (94% - 100%)    PHYSICAL EXAM:Pleasant patient in no acute distress.      Constitutional:Comfortable.Awake and alert  No cachexia     Eyes:PERRL EOMI.NO discharge or conjunctival injection    ENMT:No sinus tenderness.No thrush.No pharyngeal exudate or erythema.Fair dental hygiene    Neck:Supple,No LN,no JVD      Respiratory:Good air entry bilaterally,CTA    Cardiovascular:S1 S2 wnl, No murmurs,rub or gallops    Gastrointestinal:Soft BS(+) no tenderness no masses ,No rebound or guarding    Genitourinary:No CVA tendereness         Extremities:No cyanosis,clubbing or edema.    Vascular:peripheral pulses felt    Neurological:AAO X 3,No grossly focal deficits    Skin:No rash     Lymph Nodes:No palpable LNs    Musculoskeletal:No joint swelling or LOM    Psychiatric:Affect normal.          Labs:                            11.4   5.9   )-----------( 326      ( 04 Oct 2018 12:11 )             33.9       WBC Count: 5.9 (10-04-18 @ 12:11)  WBC Count: 6.53 (10-04-18 @ 07:58)  WBC Count: 7.8 (10-03-18 @ 17:09)    10-04    137  |  104  |  8   ----------------------------<  91  3.7   |  25  |  0.75    Ca    8.8      04 Oct 2018 05:38  Phos  3.8     10-04  Mg     2.1     10-04    TPro  6.4  /  Alb  3.2<L>  /  TBili  0.2  /  DBili  <0.1  /  AST  <5<L>  /  ALT  6<L>  /  AlkPhos  54  10-04          < from: CT Head No Cont (10.04.18 @ 10:34) >    IMPRESSION:    No acute intracranial pathology is noted. If the patient has new and   persistent symptoms, consider short interval follow-up head CT or brain   MRI follow-up if there are no MRI contraindications.          < end of copied text >      < from: CT Abdomen and Pelvis w/ Oral Cont and w/ IV Cont (10.03.18 @ 18:20) >  IMPRESSION: Small pericardial effusion, slightly increased since   10/13/2017.    Heterogeneous enhancement of the kidneys bilaterally. In addition, there   is mild enhancement of the urothelium in the proximal right ureter.The   differential diagnosis includes infection such as pyelonephritis,   ischemia, and infiltrative processes such as lymphoma and leukemia.        < end of copied text >      Urinalysis + Microscopic Examination (10.03.18 @ 18:57)    Urine Appearance: Clear    Urobilinogen: Negative    Specific Gravity: 1.031    Protein, Urine: Negative    pH Urine: 7.0    Leukocyte Esterase Concentration: Large    Nitrite: Positive    Ketone - Urine: Negative    Bilirubin: Negative    Color: Light Yellow    Glucose Qualitative, Urine: Negative    Blood, Urine: Negative    Red Blood Cell - Urine: 4 /hpf    White Blood Cell - Urine: 39 /hpf    Epithelial Cells: 2 /hpf    Hyaline Casts: 1 /lpf    Bacteria: Many

## 2018-10-04 NOTE — CHART NOTE - NSCHARTNOTEFT_GEN_A_CORE
BAYLEE PRUITT  63y, Female     Chief c/o:    This is     with past medical History of     Vital Signs:  Vital Signs Last 24 Hrs  T(C): 36.6 (04 Oct 2018 07:38), Max: 36.8 (03 Oct 2018 14:11)  T(F): 97.9 (04 Oct 2018 07:38), Max: 98.3 (03 Oct 2018 14:11)  HR: 63 (04 Oct 2018 09:08) (63 - 92)  BP: 107/54 (04 Oct 2018 09:08) (96/63 - 116/80)  BP(mean): --  RR: 20 (04 Oct 2018 09:08) (17 - 20)  SpO2: 100% (04 Oct 2018 09:08) (94% - 100%)    Labs:                        10.7   6.53  )-----------( 337      ( 04 Oct 2018 07:58 )             33.3     CBC Full  -  ( 04 Oct 2018 07:58 )  WBC Count : 6.53 K/uL  Hemoglobin : 10.7 g/dL  Hematocrit : 33.3 %  Platelet Count - Automated : 337 K/uL  Mean Cell Volume : 88.6 fl  Mean Cell Hemoglobin : 28.5 pg  Mean Cell Hemoglobin Concentration : 32.1 gm/dL  Auto Neutrophil # : x  Auto Lymphocyte # : x  Auto Monocyte # : x  Auto Eosinophil # : x  Auto Basophil # : x  Auto Neutrophil % : x  Auto Lymphocyte % : x  Auto Monocyte % : x  Auto Eosinophil % : x  Auto Basophil % : x    10-04    137  |  104  |  8   ----------------------------<  91  3.7   |  25  |  0.75    Ca    8.8      04 Oct 2018 05:38  Phos  3.8     10-04  Mg     2.1     10-04    TPro  6.4  /  Alb  3.2<L>  /  TBili  0.2  /  DBili  <0.1  /  AST  <5<L>  /  ALT  6<L>  /  AlkPhos  54  10-04        LIVER FUNCTIONS - ( 04 Oct 2018 05:38 )  Alb: 3.2 g/dL / Pro: 6.4 g/dL / ALK PHOS: 54 U/L / ALT: 6 U/L / AST: <5 U/L / GGT: x             Adult Advanced Hemodynamics Last 24 Hrs  CVP(mm Hg): --  CVP(cm H2O): --  CO: --  CI: --  PA: --  PA(mean): --  PCWP: --  SVR: --  SVRI: --  PVR: --  PVRI: --  PT/INR - ( 03 Oct 2018 17:09 )   PT: 15.4 sec;   INR: 1.42 ratio         PTT - ( 03 Oct 2018 17:09 )  PTT:33.8 sec    Physical Exam:  PHYSICAL EXAM:      Constitutional:    Eyes:    ENMT:    Neck:    Breasts:    Back:    Respiratory:    Cardiovascular:    Gastrointestinal:    Genitourinary:    Rectal:    Extremities:    Vascular:    Neurological:    Skin:    Lymph Nodes:    Musculoskeletal:    Psychiatric:        Assesment / Plan: BAYLEE PRUITT  63y, Female     Chief c/o: nausea - dizziness - syncopy    63y female H/o c- spine stenosis  who  present in ER complains of feeling ill past week with body aches, found to have UTI, IN morning she has syncope episode while using restroom. RN Tristan was notified  she denies LOC, no blurry vision     Vital Signs:  Vital Signs Last 24 Hrs  T(C): 36.6 (04 Oct 2018 07:38), Max: 36.8 (03 Oct 2018 14:11)  T(F): 97.9 (04 Oct 2018 07:38), Max: 98.3 (03 Oct 2018 14:11)  HR: 63 (04 Oct 2018 09:08) (63 - 92)  BP: 107/54 (04 Oct 2018 09:08) (96/63 - 116/80)  BP(mean): --  RR: 20 (04 Oct 2018 09:08) (17 - 20)  SpO2: 100% (04 Oct 2018 09:08) (94% - 100%)    Labs:                        10.7   6.53  )-----------( 337      ( 04 Oct 2018 07:58 )             33.3     CBC Full  -  ( 04 Oct 2018 07:58 )  WBC Count : 6.53 K/uL  Hemoglobin : 10.7 g/dL  Hematocrit : 33.3 %  Platelet Count - Automated : 337 K/uL  Mean Cell Volume : 88.6 fl  Mean Cell Hemoglobin : 28.5 pg  Mean Cell Hemoglobin Concentration : 32.1 gm/dL  Auto Neutrophil # : x  Auto Lymphocyte # : x  Auto Monocyte # : x  Auto Eosinophil # : x  Auto Basophil # : x  Auto Neutrophil % : x  Auto Lymphocyte % : x  Auto Monocyte % : x  Auto Eosinophil % : x  Auto Basophil % : x    10-04    137  |  104  |  8   ----------------------------<  91  3.7   |  25  |  0.75    Ca    8.8      04 Oct 2018 05:38  Phos  3.8     10-04  Mg     2.1     10-04    TPro  6.4  /  Alb  3.2<L>  /  TBili  0.2  /  DBili  <0.1  /  AST  <5<L>  /  ALT  6<L>  /  AlkPhos  54  10-04        LIVER FUNCTIONS - ( 04 Oct 2018 05:38 )  Alb: 3.2 g/dL / Pro: 6.4 g/dL / ALK PHOS: 54 U/L / ALT: 6 U/L / AST: <5 U/L / GGT: x                  PTT - ( 03 Oct 2018 17:09 )  PTT:33.8 sec    Physical Exam:  PHYSICAL EXAM:      Constitutional: alert, orient x 3     Eyes: denies blurry vision     ENMT:    Neck: supple , has chronic neck pain 2/10 no laceration       Back: denies back pain    Respiratory:   Lung clear    Cardiovascular: s1 s2 NSr        Extremities: B/L upper and Lower no laceration         Neurological: aLERT, ORIENT X 3            Assesment / Plan: 63 YRS OLD FEMALE ADMITTED FOR uti FEELING DIZZINESS , HAS SYNCOPE EPISODE AND FALL IN BATHROOM    nEURO- hEAD CT ORDERED  cv- 2 Decho - change to tele  EKG now  cardiology Dr Knight called  fall precaution  Above plan d/w Dr Woo Alcala RPA C

## 2018-10-04 NOTE — CONSULT NOTE ADULT - SUBJECTIVE AND OBJECTIVE BOX
HPI:  63 year old woman with pmhx depression, anxiety, PTSD admitted with 1 week of body aches, sweats, chills, nausea, mild loose stools, dysuria, went to see PMD and found to be hypotensive referred to ED, +UA, started on fluids and antibiotics for suspected UTI/pyleo. Pt reports was ambulating from bathroom, had stood washed hands and then felt acutely lightheaded, legs felt 'like jelly' and fell into the door, hit head, since then has had vertiginous dizziness, positional. Has hx of a few bouts of mild vertigo in past in bed when turns head. CT head no acute changes. Denies any headaches, no diplopia, no facial or limb weakness, paresthesias or dysarthria. Has had some mild paresthesias in hands and feet this week.     Allergies  e-mycin,tetracycline,other antibiotic (Rash)  erythromycin (Rash)  Intolerances    MEDICATIONS  (STANDING):  cefTRIAXone   IVPB 1 Gram(s) IV Intermittent every 12 hours  escitalopram 30 milliGRAM(s) Oral daily  heparin  Injectable 5000 Unit(s) SubCutaneous every 12 hours  sodium chloride 0.9%. 1000 milliLiter(s) (100 mL/Hr) IV Continuous <Continuous>    MEDICATIONS  (PRN):  meclizine 12.5 milliGRAM(s) Oral every 8 hours PRN Dizziness    PAST MEDICAL & SURGICAL HISTORY: as per chart    Social: Smoker  FAMILY HISTORY: noncontributory    Advanced care directives reviewed and in chart    Vital Signs Last 24 Hrs  T(C): 36.7 (04 Oct 2018 16:10), Max: 36.8 (04 Oct 2018 05:38)  T(F): 98.1 (04 Oct 2018 16:10), Max: 98.3 (04 Oct 2018 05:38)  HR: 50 (04 Oct 2018 16:10) (50 - 92)  BP: 124/73 (04 Oct 2018 16:10) (104/67 - 124/73)  BP(mean): --  RR: 18 (04 Oct 2018 16:10) (16 - 20)  SpO2: 98% (04 Oct 2018 16:10) (94% - 100%)    NEUROLOGICAL EXAM:    Mental status: Awake, alert, and in no apparent distress. Oriented x3. Language function is normal. Recent memory, digit span and concentration were normal.     Cranial Nerves: Pupils were equal, round, reactive to light. Extraocular movements were intact but uncomfortable with eye movements, closes eyes often and difficult to evaluate for nystagmus. Visual field were full. Fundoscopic exam was deferred. Facial sensation was intact to light touch. There was no facial asymmetry. The palate was upgoing symmetrically and tongue was midline. Hearing acuity was intact to finger rub AU. Shoulder shrug was full bilaterally    Motor exam: Bulk and tone were normal. Strength was 5/5 in all four extremities. Fine finger movements were symmetric and normal. There was no pronator drift    Reflexes: 2+ in the bilateral upper extremities. 2+ in the bilateral lower extremities. Toes were downgoing bilaterally.     Sensation: Intact to light touch, temperature, and proprioception.     Coordination: Finger-nose-finger without dysmetria.     Gait: deferred    NIHSS=0.    < from: CT Head No Cont (10.04.18 @ 10:34) >  EXAM:  CT BRAIN                            PROCEDURE DATE:  10/04/2018            INTERPRETATION:  History: Syncope. Headaches. Hit head.    Description: A noncontrast head CT was performed. Axial images were   performed from the skull base to the vertex with coronal/sagittal   reconstructions.    Comparison is made to a brain MRI from 2017, head CT 10/19/2007.    There is no evidence for acute infarct, acute hemorrhage, mass effect,   calvarial fracture, or hydrocephalus.    Minimal patchy hypodensity without mass effect is noted in the   periventricular white matter which most likely represents chronic   microvascular ischemic changes given the patient's age.    Cerebral volume loss is present with secondary proportional prominence of   the sulci and ventricles.    No lytic or blastic calvarial lesions are noted. The visualized portions   of the paranasal sinuses and mastoid air cells are clear.    IMPRESSION:    No acute intracranial pathology is noted. If the patient has new and   persistent symptoms, consider short interval follow-up head CT or brain   MRI follow-up if there are no MRI contraindications.    JADEN COOK M.D., ATTENDING RADIOLOGIST  This document has been electronically signed. Oct  4 2018 10:40AM    Labs:  CBC Full  -  ( 04 Oct 2018 12:11 )  WBC Count : 5.9 K/uL  Hemoglobin : 11.4 g/dL  Hematocrit : 33.9 %  Platelet Count - Automated : 326 K/uL  Mean Cell Volume : 87.9 fl  Mean Cell Hemoglobin : 29.6 pg  Mean Cell Hemoglobin Concentration : 33.7 gm/dL  Auto Neutrophil # : x  Auto Lymphocyte # : x  Auto Monocyte # : x  Auto Eosinophil # : x  Auto Basophil # : x  Auto Neutrophil % : x  Auto Lymphocyte % : x  Auto Monocyte % : x  Auto Eosinophil % : x  Auto Basophil % : x    10-    137  |  104  |  8   ----------------------------<  91  3.7   |  25  |  0.75    Ca    8.8      04 Oct 2018 05:38  Phos  3.8     10-  Mg     2.1     10-    TPro  6.4  /  Alb  3.2<L>  /  TBili  0.2  /  DBili  <0.1  /  AST  <5<L>  /  ALT  6<L>  /  AlkPhos  54  10-04    CAPILLARY BLOOD GLUCOSE    LIVER FUNCTIONS - ( 04 Oct 2018 05:38 )  Alb: 3.2 g/dL / Pro: 6.4 g/dL / ALK PHOS: 54 U/L / ALT: 6 U/L / AST: <5 U/L / GGT: x           PT/INR - ( 03 Oct 2018 17:09 )   PT: 15.4 sec;   INR: 1.42 ratio         PTT - ( 03 Oct 2018 17:09 )  PTT:33.8 sec  Urinalysis Basic - ( 03 Oct 2018 18:57 )    Color: Light Yellow / Appearance: Clear / S.031 / pH: x  Gluc: x / Ketone: Negative  / Bili: Negative / Urobili: Negative   Blood: x / Protein: Negative / Nitrite: Positive   Leuk Esterase: Large / RBC: 4 /hpf / WBC 39 /hpf   Sq Epi: x / Non Sq Epi: 2 /hpf / Bacteria: Many

## 2018-10-04 NOTE — PROGRESS NOTE ADULT - SUBJECTIVE AND OBJECTIVE BOX
CHIEF COMPLAINT:Patient is a 63y old  Female who presents with a chief complaint of   	        PAST MEDICAL & SURGICAL HISTORY:          REVIEW OF SYSTEMS:  CONSTITUTIONAL: fall earlier   EYES: No eye pain, visual disturbances, or discharge  NECK: No pain or stiffness  RESPIRATORY: No cough, wheezing, chills or hemoptysis; No Shortness of Breath  CARDIOVASCULAR: No chest pain, palpitations,  or leg swelling  GASTROINTESTINAL: No abdominal or epigastric pain. No nausea, vomiting, or hematemesis; No diarrhea or constipation. No melena or hematochezia.  GENITOURINARY: No dysuria, frequency, hematuria, or incontinence  NEUROLOGICAL: No headaches, memory loss, loss of strength, numbness, or tremors  MUSCULOSKELETAL: No joint pain or swelling; No muscle, back, or extremity pain    Medications:  MEDICATIONS  (STANDING):  cefTRIAXone   IVPB 1 Gram(s) IV Intermittent every 12 hours  escitalopram 30 milliGRAM(s) Oral daily  heparin  Injectable 5000 Unit(s) SubCutaneous every 12 hours  potassium chloride    Tablet ER 40 milliEquivalent(s) Oral once  sodium chloride 0.9%. 1000 milliLiter(s) (100 mL/Hr) IV Continuous <Continuous>    MEDICATIONS  (PRN):    	    PHYSICAL EXAM:  T(C): 36.6 (10-04-18 @ 07:38), Max: 36.8 (10-03-18 @ 14:11)  HR: 63 (10-04-18 @ 09:08) (63 - 92)  BP: 107/54 (10-04-18 @ 09:08) (96/63 - 116/80)  RR: 20 (10-04-18 @ 09:08) (17 - 20)  SpO2: 100% (10-04-18 @ 09:08) (94% - 100%)  Wt(kg): --  I&O's Summary      Appearance: Normal	  HEENT:   Normal oral mucosa, PERRL, EOMI	  Lymphatic: No lymphadenopathy  Cardiovascular: Normal S1 S2, No JVD, No murmurs, No edema  Respiratory: Lungs clear to auscultation	  Psychiatry: A & O x 3, Mood & affect appropriate  Gastrointestinal:  Soft, Non-tender, + BS	  Skin: No rashes, No ecchymoses, No cyanosis	  Neurologic: Non-focal motor equal b/l  sensory intact  dtr equal   Extremities: Normal range of motion, No clubbing, cyanosis or edema  Vascular: Peripheral pulses palpable 2+ bilaterally    TELEMETRY: 	    ECG:  	  RADIOLOGY:  OTHER: 	  	  LABS:	 	    CARDIAC MARKERS:                                10.7   6.53  )-----------( 337      ( 04 Oct 2018 07:58 )             33.3     10-04    137  |  104  |  8   ----------------------------<  91  3.7   |  25  |  0.75    Ca    8.8      04 Oct 2018 05:38  Phos  3.8     10-04  Mg     2.1     10-04    TPro  6.4  /  Alb  3.2<L>  /  TBili  0.2  /  DBili  <0.1  /  AST  <5<L>  /  ALT  6<L>  /  AlkPhos  54  10-04    proBNP:   Lipid Profile:   HgA1c:   TSH:

## 2018-10-05 DIAGNOSIS — N12 TUBULO-INTERSTITIAL NEPHRITIS, NOT SPECIFIED AS ACUTE OR CHRONIC: ICD-10-CM

## 2018-10-05 DIAGNOSIS — F41.9 ANXIETY DISORDER, UNSPECIFIED: ICD-10-CM

## 2018-10-05 DIAGNOSIS — N39.0 URINARY TRACT INFECTION, SITE NOT SPECIFIED: ICD-10-CM

## 2018-10-05 DIAGNOSIS — F32.9 MAJOR DEPRESSIVE DISORDER, SINGLE EPISODE, UNSPECIFIED: ICD-10-CM

## 2018-10-05 LAB
-  AMIKACIN: SIGNIFICANT CHANGE UP
-  AMOXICILLIN/CLAVULANIC ACID: SIGNIFICANT CHANGE UP
-  AMPICILLIN/SULBACTAM: SIGNIFICANT CHANGE UP
-  AMPICILLIN: SIGNIFICANT CHANGE UP
-  AZTREONAM: SIGNIFICANT CHANGE UP
-  CEFAZOLIN: SIGNIFICANT CHANGE UP
-  CEFEPIME: SIGNIFICANT CHANGE UP
-  CEFOXITIN: SIGNIFICANT CHANGE UP
-  CEFTRIAXONE: SIGNIFICANT CHANGE UP
-  CIPROFLOXACIN: SIGNIFICANT CHANGE UP
-  ERTAPENEM: SIGNIFICANT CHANGE UP
-  GENTAMICIN: SIGNIFICANT CHANGE UP
-  IMIPENEM: SIGNIFICANT CHANGE UP
-  LEVOFLOXACIN: SIGNIFICANT CHANGE UP
-  MEROPENEM: SIGNIFICANT CHANGE UP
-  NITROFURANTOIN: SIGNIFICANT CHANGE UP
-  PIPERACILLIN/TAZOBACTAM: SIGNIFICANT CHANGE UP
-  TIGECYCLINE: SIGNIFICANT CHANGE UP
-  TOBRAMYCIN: SIGNIFICANT CHANGE UP
-  TRIMETHOPRIM/SULFAMETHOXAZOLE: SIGNIFICANT CHANGE UP
ANION GAP SERPL CALC-SCNC: 9 MMOL/L — SIGNIFICANT CHANGE UP (ref 5–17)
ANION GAP SERPL CALC-SCNC: 9 MMOL/L — SIGNIFICANT CHANGE UP (ref 5–17)
BUN SERPL-MCNC: 8 MG/DL — SIGNIFICANT CHANGE UP (ref 7–23)
BUN SERPL-MCNC: 8 MG/DL — SIGNIFICANT CHANGE UP (ref 7–23)
CALCIUM SERPL-MCNC: 8.9 MG/DL — SIGNIFICANT CHANGE UP (ref 8.4–10.5)
CALCIUM SERPL-MCNC: 9.1 MG/DL — SIGNIFICANT CHANGE UP (ref 8.4–10.5)
CHLORIDE SERPL-SCNC: 106 MMOL/L — SIGNIFICANT CHANGE UP (ref 96–108)
CHLORIDE SERPL-SCNC: 107 MMOL/L — SIGNIFICANT CHANGE UP (ref 96–108)
CO2 SERPL-SCNC: 24 MMOL/L — SIGNIFICANT CHANGE UP (ref 22–31)
CO2 SERPL-SCNC: 25 MMOL/L — SIGNIFICANT CHANGE UP (ref 22–31)
CREAT SERPL-MCNC: 0.74 MG/DL — SIGNIFICANT CHANGE UP (ref 0.5–1.3)
CREAT SERPL-MCNC: 0.75 MG/DL — SIGNIFICANT CHANGE UP (ref 0.5–1.3)
CRP SERPL-MCNC: 3.48 MG/DL — HIGH (ref 0–0.4)
CULTURE RESULTS: SIGNIFICANT CHANGE UP
ERYTHROCYTE [SEDIMENTATION RATE] IN BLOOD: 62 MM/HR — HIGH (ref 0–20)
GLUCOSE SERPL-MCNC: 84 MG/DL — SIGNIFICANT CHANGE UP (ref 70–99)
GLUCOSE SERPL-MCNC: 84 MG/DL — SIGNIFICANT CHANGE UP (ref 70–99)
HCT VFR BLD CALC: 33 % — LOW (ref 34.5–45)
HGB BLD-MCNC: 10.5 G/DL — LOW (ref 11.5–15.5)
MAGNESIUM SERPL-MCNC: 2.1 MG/DL — SIGNIFICANT CHANGE UP (ref 1.6–2.6)
MCHC RBC-ENTMCNC: 28.4 PG — SIGNIFICANT CHANGE UP (ref 27–34)
MCHC RBC-ENTMCNC: 31.8 GM/DL — LOW (ref 32–36)
MCV RBC AUTO: 89.2 FL — SIGNIFICANT CHANGE UP (ref 80–100)
METHOD TYPE: SIGNIFICANT CHANGE UP
ORGANISM # SPEC MICROSCOPIC CNT: SIGNIFICANT CHANGE UP
ORGANISM # SPEC MICROSCOPIC CNT: SIGNIFICANT CHANGE UP
PHOSPHATE SERPL-MCNC: 3.5 MG/DL — SIGNIFICANT CHANGE UP (ref 2.5–4.5)
PLATELET # BLD AUTO: 366 K/UL — SIGNIFICANT CHANGE UP (ref 150–400)
POTASSIUM SERPL-MCNC: 4.5 MMOL/L — SIGNIFICANT CHANGE UP (ref 3.5–5.3)
POTASSIUM SERPL-MCNC: 4.6 MMOL/L — SIGNIFICANT CHANGE UP (ref 3.5–5.3)
POTASSIUM SERPL-SCNC: 4.5 MMOL/L — SIGNIFICANT CHANGE UP (ref 3.5–5.3)
POTASSIUM SERPL-SCNC: 4.6 MMOL/L — SIGNIFICANT CHANGE UP (ref 3.5–5.3)
RBC # BLD: 3.7 M/UL — LOW (ref 3.8–5.2)
RBC # FLD: 13.7 % — SIGNIFICANT CHANGE UP (ref 10.3–14.5)
RSV IGG SER-ACNC: ABNORMAL
RSV IGM SER-ACNC: SIGNIFICANT CHANGE UP
SODIUM SERPL-SCNC: 140 MMOL/L — SIGNIFICANT CHANGE UP (ref 135–145)
SODIUM SERPL-SCNC: 140 MMOL/L — SIGNIFICANT CHANGE UP (ref 135–145)
SPECIMEN SOURCE: SIGNIFICANT CHANGE UP
WBC # BLD: 6.16 K/UL — SIGNIFICANT CHANGE UP (ref 3.8–10.5)
WBC # FLD AUTO: 6.16 K/UL — SIGNIFICANT CHANGE UP (ref 3.8–10.5)

## 2018-10-05 PROCEDURE — 93306 TTE W/DOPPLER COMPLETE: CPT | Mod: 26

## 2018-10-05 PROCEDURE — 73130 X-RAY EXAM OF HAND: CPT | Mod: 26,LT

## 2018-10-05 PROCEDURE — 99232 SBSQ HOSP IP/OBS MODERATE 35: CPT

## 2018-10-05 PROCEDURE — 99222 1ST HOSP IP/OBS MODERATE 55: CPT

## 2018-10-05 RX ORDER — COLCHICINE 0.6 MG
0.6 TABLET ORAL
Qty: 0 | Refills: 0 | Status: DISCONTINUED | OUTPATIENT
Start: 2018-10-05 | End: 2018-10-19

## 2018-10-05 RX ORDER — MIRTAZAPINE 45 MG/1
7.5 TABLET, ORALLY DISINTEGRATING ORAL AT BEDTIME
Qty: 0 | Refills: 0 | Status: DISCONTINUED | OUTPATIENT
Start: 2018-10-05 | End: 2018-10-09

## 2018-10-05 RX ORDER — IBUPROFEN 200 MG
600 TABLET ORAL THREE TIMES A DAY
Qty: 0 | Refills: 0 | Status: DISCONTINUED | OUTPATIENT
Start: 2018-10-05 | End: 2018-10-10

## 2018-10-05 RX ORDER — ONDANSETRON 8 MG/1
4 TABLET, FILM COATED ORAL ONCE
Qty: 0 | Refills: 0 | Status: COMPLETED | OUTPATIENT
Start: 2018-10-05 | End: 2018-10-05

## 2018-10-05 RX ADMIN — MIRTAZAPINE 7.5 MILLIGRAM(S): 45 TABLET, ORALLY DISINTEGRATING ORAL at 22:39

## 2018-10-05 RX ADMIN — ESCITALOPRAM OXALATE 30 MILLIGRAM(S): 10 TABLET, FILM COATED ORAL at 12:50

## 2018-10-05 RX ADMIN — ONDANSETRON 4 MILLIGRAM(S): 8 TABLET, FILM COATED ORAL at 19:52

## 2018-10-05 RX ADMIN — CEFTRIAXONE 100 GRAM(S): 500 INJECTION, POWDER, FOR SOLUTION INTRAMUSCULAR; INTRAVENOUS at 05:56

## 2018-10-05 RX ADMIN — Medication 600 MILLIGRAM(S): at 23:30

## 2018-10-05 RX ADMIN — SODIUM CHLORIDE 100 MILLILITER(S): 9 INJECTION INTRAMUSCULAR; INTRAVENOUS; SUBCUTANEOUS at 00:17

## 2018-10-05 RX ADMIN — HEPARIN SODIUM 5000 UNIT(S): 5000 INJECTION INTRAVENOUS; SUBCUTANEOUS at 05:56

## 2018-10-05 RX ADMIN — CEFTRIAXONE 100 GRAM(S): 500 INJECTION, POWDER, FOR SOLUTION INTRAMUSCULAR; INTRAVENOUS at 17:00

## 2018-10-05 RX ADMIN — Medication 0.6 MILLIGRAM(S): at 18:16

## 2018-10-05 RX ADMIN — Medication 600 MILLIGRAM(S): at 22:39

## 2018-10-05 RX ADMIN — HEPARIN SODIUM 5000 UNIT(S): 5000 INJECTION INTRAVENOUS; SUBCUTANEOUS at 17:01

## 2018-10-05 NOTE — PROGRESS NOTE ADULT - SUBJECTIVE AND OBJECTIVE BOX
CHIEF COMPLAINT:Patient is a 63y old  Female who presents with a chief complaint of hypotension (04 Oct 2018 19:54)    	        PAST MEDICAL & SURGICAL HISTORY:          REVIEW OF SYSTEMS:  CONSTITUTIONAL:feels better  EYES: No eye pain, visual disturbances, or discharge  NECK: No pain or stiffness  RESPIRATORY: No cough, wheezing, chills or hemoptysis; No Shortness of Breath  CARDIOVASCULAR: No chest pain, palpitations, passing out, dizziness, or leg swelling  GASTROINTESTINAL: No abdominal or epigastric pain. No nausea, vomiting, or hematemesis; No diarrhea or constipation. No melena or hematochezia.  GENITOURINARY: No dysuria, frequency, hematuria, or incontinence  less cva pain  NEUROLOGICAL: No headaches, memory loss, loss of strength, numbness, or tremors  MUSCULOSKELETAL: No joint pain or swelling; No muscle, back, or extremity pain    Medications:  MEDICATIONS  (STANDING):  cefTRIAXone   IVPB 1 Gram(s) IV Intermittent every 12 hours  escitalopram 30 milliGRAM(s) Oral daily  heparin  Injectable 5000 Unit(s) SubCutaneous every 12 hours  sodium chloride 0.9%. 1000 milliLiter(s) (100 mL/Hr) IV Continuous <Continuous>    MEDICATIONS  (PRN):  meclizine 12.5 milliGRAM(s) Oral every 8 hours PRN Dizziness    	    PHYSICAL EXAM:  T(C): 36.6 (10-05-18 @ 04:33), Max: 36.8 (10-04-18 @ 20:02)  HR: 55 (10-05-18 @ 04:33) (50 - 94)  BP: 120/63 (10-05-18 @ 04:33) (104/67 - 124/73)  RR: 18 (10-05-18 @ 04:33) (16 - 18)  SpO2: 99% (10-05-18 @ 04:33) (95% - 100%)  Wt(kg): --  I&O's Summary    04 Oct 2018 07:01  -  05 Oct 2018 07:00  --------------------------------------------------------  IN: 1440 mL / OUT: 0 mL / NET: 1440 mL        Appearance: Normal	  HEENT:   Normal oral mucosa, PERRL, EOMI	  Lymphatic: No lymphadenopathy  Cardiovascular: Normal S1 S2, No JVD, No murmurs, No edema  Respiratory: Lungs clear to auscultation	  Psychiatry: A & O x 3, Mood & affect appropriate  Gastrointestinal:  Soft, Non-tender, + BS	  Skin: No rashes, No ecchymoses, No cyanosis	  Neurologic: Non-focal  Extremities: Normal range of motion, No clubbing, cyanosis or edema  Vascular: Peripheral pulses palpable 2+ bilaterally    TELEMETRY: 	    ECG:  	  RADIOLOGY:  OTHER: 	  	  LABS:	 	    CARDIAC MARKERS:  CARDIAC MARKERS ( 04 Oct 2018 12:11 )  x     / x     / 30 U/L / x     / 1.0 ng/mL                                10.5   6.16  )-----------( 366      ( 05 Oct 2018 08:13 )             33.0     10-05    140  |  106  |  8   ----------------------------<  84  4.5   |  25  |  0.75    Ca    9.1      05 Oct 2018 06:22  Phos  3.5     10-05  Mg     2.1     10-05    TPro  6.4  /  Alb  3.2<L>  /  TBili  0.2  /  DBili  <0.1  /  AST  <5<L>  /  ALT  6<L>  /  AlkPhos  54  10-04    proBNP:   Lipid Profile:   HgA1c:   TSH:

## 2018-10-05 NOTE — BEHAVIORAL HEALTH ASSESSMENT NOTE - CASE SUMMARY
63 year old female, single, retired, domiciled with PPH significant for depression, anxiety, PTSD and no significant PMH presents initially with dizziness, pyelonephritis, seen for depression. pt was in tx with dr. waite,now interested in seeing another psychiatrist. Pt c/o mutiple depressive sx over the years, anhedonia, dec sleep, worsening anxiety, panic attacks, no current si/hi. pt on lexapro 30mg, rec cont lexapro for now, can start remeron 7.5mg po qhs. d/c neurontin.

## 2018-10-05 NOTE — CONSULT NOTE ADULT - PROBLEM SELECTOR RECOMMENDATION 9
-CT renal and ureteral findings likely due to UTI/pyelonephritis  -No acute urologic intervention indicated at this time  -F/u cultures  -Abx per primary team  -Bladder scan to make sure pt is emptying bladder  -Monitor CBC, Cr, ins/outs  -Can consider repeating CT a/p with IV contrast in 1 month following UTI resolution to assess renal and ureteral enhancement once UTI resolved  -If repeat CT a/p demonstrates persistent renal and ureteral abnormalities, will require outpatient urologic follow-up for further evaluation.  -Thank you for the consult. Please call with questions.

## 2018-10-05 NOTE — BEHAVIORAL HEALTH ASSESSMENT NOTE - SUMMARY
63 year old female, single, retired, domiciled with PPH significant for depression, anxiety, PTSD and no significant PMH presents initially with dizziness. Found to have presumptive pyelonephritis.  Psych CL consulted to assist with anxiety, depressive symptoms.    Patient meets criteria for unspecified depression- r/o MDD. She also meets criteria for unspecified anxiety. She carries a history of PTSD. Her anxious and mood symptoms may be exacerbated in the acute setting of struggling with her medical issues.  In this setting would not make too many changes at once as she already is on over-max dose of lexapro. Will try and target sleep symptoms.    - continue lexapro 30mg daily  - start mirtazapine 7.5mg HS  - continue to offer support

## 2018-10-05 NOTE — BEHAVIORAL HEALTH ASSESSMENT NOTE - NSBHCHARTREVIEWVS_PSY_A_CORE FT
Vital Signs Last 24 Hrs  T(C): 36.7 (05 Oct 2018 11:30), Max: 36.8 (04 Oct 2018 20:02)  T(F): 98 (05 Oct 2018 11:30), Max: 98.3 (04 Oct 2018 20:02)  HR: 55 (05 Oct 2018 04:33) (50 - 94)  BP: 120/63 (05 Oct 2018 04:33) (107/67 - 124/73)  BP(mean): --  RR: 18 (05 Oct 2018 11:30) (18 - 18)  SpO2: 98% (05 Oct 2018 11:30) (95% - 99%)

## 2018-10-05 NOTE — PROGRESS NOTE ADULT - SUBJECTIVE AND OBJECTIVE BOX
Neurology Follow Up  Subjective  Vertigo slightly improved but marginally, still when turns head to right, tried meclizine felt very nauseated    NEUROLOGICAL EXAM:    Mental status: Awake, alert, and in no apparent distress. Oriented x3. Language function is normal. Recent memory, digit span and concentration were normal.     Cranial Nerves: Pupils were equal, round, reactive to light. Extraocular movements were intact but uncomfortable with eye movements, closes eyes often and difficult to evaluate for nystagmus. Visual field were full. Fundoscopic exam was deferred. Facial sensation was intact to light touch. There was no facial asymmetry. The palate was upgoing symmetrically and tongue was midline. Hearing acuity was intact to finger rub AU. Shoulder shrug was full bilaterally    Motor exam: Bulk and tone were normal. Strength was 5/5 in all four extremities. Fine finger movements were symmetric and normal. There was no pronator drift    Reflexes: 2+ in the bilateral upper extremities. 2+ in the bilateral lower extremities. Toes were downgoing bilaterally.     Sensation: Intact to light touch, temperature, and proprioception.     Coordination: Finger-nose-finger without dysmetria.     Gait: deferred    NIHSS=0.    < from: CT Head No Cont (10.04.18 @ 10:34) >  EXAM:  CT BRAIN                            PROCEDURE DATE:  10/04/2018            INTERPRETATION:  History: Syncope. Headaches. Hit head.    Description: A noncontrast head CT was performed. Axial images were   performed from the skull base to the vertex with coronal/sagittal   reconstructions.    Comparison is made to a brain MRI from 2017, head CT 10/19/2007.    There is no evidence for acute infarct, acute hemorrhage, mass effect,   calvarial fracture, or hydrocephalus.    Minimal patchy hypodensity without mass effect is noted in the   periventricular white matter which most likely represents chronic   microvascular ischemic changes given the patient's age.    Cerebral volume loss is present with secondary proportional prominence of   the sulci and ventricles.    No lytic or blastic calvarial lesions are noted. The visualized portions   of the paranasal sinuses and mastoid air cells are clear.    IMPRESSION:    No acute intracranial pathology is noted. If the patient has new and   persistent symptoms, consider short interval follow-up head CT or brain   MRI follow-up if there are no MRI contraindications.    JADEN COOK M.D., ATTENDING RADIOLOGIST  This document has been electronically signed. Oct  4 2018 10:40AM    Labs:  CBC Full  -  ( 04 Oct 2018 12:11 )  WBC Count : 5.9 K/uL  Hemoglobin : 11.4 g/dL  Hematocrit : 33.9 %  Platelet Count - Automated : 326 K/uL  Mean Cell Volume : 87.9 fl  Mean Cell Hemoglobin : 29.6 pg  Mean Cell Hemoglobin Concentration : 33.7 gm/dL  Auto Neutrophil # : x  Auto Lymphocyte # : x  Auto Monocyte # : x  Auto Eosinophil # : x  Auto Basophil # : x  Auto Neutrophil % : x  Auto Lymphocyte % : x  Auto Monocyte % : x  Auto Eosinophil % : x  Auto Basophil % : x    10-    137  |  104  |  8   ----------------------------<  91  3.7   |  25  |  0.75    Ca    8.8      04 Oct 2018 05:38  Phos  3.8     10-  Mg     2.1     10-04    TPro  6.4  /  Alb  3.2<L>  /  TBili  0.2  /  DBili  <0.1  /  AST  <5<L>  /  ALT  6<L>  /  AlkPhos  54  10-04    CAPILLARY BLOOD GLUCOSE    LIVER FUNCTIONS - ( 04 Oct 2018 05:38 )  Alb: 3.2 g/dL / Pro: 6.4 g/dL / ALK PHOS: 54 U/L / ALT: 6 U/L / AST: <5 U/L / GGT: x           PT/INR - ( 03 Oct 2018 17:09 )   PT: 15.4 sec;   INR: 1.42 ratio         PTT - ( 03 Oct 2018 17:09 )  PTT:33.8 sec  Urinalysis Basic - ( 03 Oct 2018 18:57 )    Color: Light Yellow / Appearance: Clear / S.031 / pH: x  Gluc: x / Ketone: Negative  / Bili: Negative / Urobili: Negative   Blood: x / Protein: Negative / Nitrite: Positive   Leuk Esterase: Large / RBC: 4 /hpf / WBC 39 /hpf   Sq Epi: x / Non Sq Epi: 2 /hpf / Bacteria: Many

## 2018-10-05 NOTE — CONSULT NOTE ADULT - SUBJECTIVE AND OBJECTIVE BOX
HISTORY OF PRESENT ILLNESS:  64 yo female with h/o OA, knee pains who p/w recent malaise, weakness, sweats.  symptoms started in the last few weeks.  Saw PMD and was hypotensive. Echo revealed pericardial effusion.  She denies abd pain, rashes, overt fever.  +Knee pain, occasional neck stiffness.    PAST MEDICAL & SURGICAL HISTORY:  Anxiety  No significant past surgical history        MEDICATIONS  (STANDING):  cefTRIAXone   IVPB 1 Gram(s) IV Intermittent every 12 hours  colchicine 0.6 milliGRAM(s) Oral two times a day  escitalopram 30 milliGRAM(s) Oral daily  heparin  Injectable 5000 Unit(s) SubCutaneous every 12 hours  ibuprofen  Tablet. 600 milliGRAM(s) Oral three times a day  mirtazapine 7.5 milliGRAM(s) Oral at bedtime  sodium chloride 0.9%. 1000 milliLiter(s) (100 mL/Hr) IV Continuous <Continuous>    MEDICATIONS  (PRN):  meclizine 12.5 milliGRAM(s) Oral every 8 hours PRN Dizziness      Allergies    e-mycin,tetracycline,other antibiotic (Rash)  erythromycin (Rash)    Intolerances        PERTINENT MEDICATION HISTORY:    SOCIAL HISTORY:    FAMILY HISTORY:      Vital Signs Last 24 Hrs  T(C): 36.7 (05 Oct 2018 11:30), Max: 36.8 (04 Oct 2018 20:02)  T(F): 98 (05 Oct 2018 11:30), Max: 98.3 (04 Oct 2018 20:02)  HR: 55 (05 Oct 2018 04:33) (55 - 94)  BP: 120/63 (05 Oct 2018 04:33) (107/67 - 120/63)  BP(mean): --  RR: 18 (05 Oct 2018 11:30) (18 - 18)  SpO2: 98% (05 Oct 2018 11:30) (95% - 99%)    Daily     Daily     PHYSICAL EXAM:      Constitutional:  well appearing    Eyes:  EOMI    ENMT:    Neck;  Supple, no JOSE G, masses    Back:  no pain     Respiratory:    Cardiovascular:    Gastrointestinal:  abd soft nt nd    Extremities:  NO edema        Neurological:  grossly intact    Skin:  No rashes    Lymph Nodes:    Musculoskeletal:  Finger injury to L index; tender and red  No other active synovitis    Psychiatric:  appropriate, alert      LABS:                        10.5   6.16  )-----------( 366      ( 05 Oct 2018 08:13 )             33.0     10-05    140  |  106  |  8   ----------------------------<  84  4.5   |  25  |  0.75    Ca    9.1      05 Oct 2018 06:22  Phos  3.5     10-  Mg     2.1     10-    TPro  6.4  /  Alb  3.2<L>  /  TBili  0.2  /  DBili  <0.1  /  AST  <5<L>  /  ALT  6<L>  /  AlkPhos  54  10-04      Urinalysis Basic - ( 03 Oct 2018 18:57 )    Color: Light Yellow / Appearance: Clear / S.031 / pH: x  Gluc: x / Ketone: Negative  / Bili: Negative / Urobili: Negative   Blood: x / Protein: Negative / Nitrite: Positive   Leuk Esterase: Large / RBC: 4 /hpf / WBC 39 /hpf   Sq Epi: x / Non Sq Epi: 2 /hpf / Bacteria: Many        RADIOLOGY & ADDITIONAL STUDIES:  < from: CT Abdomen and Pelvis w/ Oral Cont and w/ IV Cont (10.03.18 @ 18:20) >  CHEST:     LUNGS AND LARGE AIRWAYS: Mild debris in the trachea. No focal areas of   consolidation.  PLEURA: No pleural effusion.  VESSELS: Within normal limits.  HEART: Heart size is normal. Small pericardial effusion, slightly   increased since 10/13/2017.   MEDIASTINUM AND MARY: No lymphadenopathy.  CHEST WALL AND LOWER NECK: Within normal limits.    ABDOMEN AND PELVIS:    LIVER: A 0.4 cm cyst in the left lobe isunchanged since 2017.  BILE DUCTS: Normal caliber.  GALLBLADDER: Within normal limits.  SPLEEN: Within normal limits.  PANCREAS: Within normal limits.  ADRENALS: Within normal limits.  KIDNEYS/URETERS: No hydronephrosis. Heterogeneous enhancement of the   kidneys bilaterally, with peripheral, somewhat wedge-shaped, areas of   decreased attenuation. Mild enhancement of the urothelium in the proximal   right ureter. A subcentimeter hypodense lesion in the upper pole of the   right kidney is unchanged since prior studies.    BLADDER: Within normal limits.  REPRODUCTIVE ORGANS: The uterus and adnexa are within normal limits.    BOWEL: No bowel obstruction.   PERITONEUM: No ascites.  VESSELS:  Mild atherosclerotic disease.  RETROPERITONEUM: No lymphadenopathy.    ABDOMINAL WALL: Small fat-containing umbilical hernia.  BONES: Mild multilevel degenerative disease and scoliosis of the   thoracolumbar spine. A sclerotic lesion is noted. in the proximal left femur    < end of copied text >

## 2018-10-05 NOTE — PROGRESS NOTE ADULT - SUBJECTIVE AND OBJECTIVE BOX
Patient is a 63y old  Female who presents with a chief complaint of hypotension lethargy left flank pain (05 Oct 2018 14:07)    Being followed by ID for ?UTI    Interval history:urinsray symptoms better  still with bodyaches  No acute events      ROS:  No cough,SOB,CP  No N/V/D./abd pain  No other complaints      Antimicrobials:    cefTRIAXone   IVPB 1 Gram(s) IV Intermittent every 12 hours    Other medications reviewed    Vital Signs Last 24 Hrs  T(C): 36.7 (10-05-18 @ 11:30), Max: 36.8 (10-04-18 @ 20:02)  T(F): 98 (10-05-18 @ 11:30), Max: 98.3 (10-04-18 @ 20:02)  HR: 55 (10-05-18 @ 04:33) (50 - 94)  BP: 120/63 (10-05-18 @ 04:33) (104/67 - 124/73)  BP(mean): --  RR: 18 (10-05-18 @ 11:30) (16 - 18)  SpO2: 98% (10-05-18 @ 11:30) (95% - 99%)    Physical Exam:        HEENT PERRLA EOMI    No oral exudate or erythema    Chest Good AE,CTA    CVS RRR S1 S2 WNl No murmur or rub or gallop    Abd soft BS normal No tenderness no masses    IV site no erythema tenderness or discharge    CNS AAO X 3 no focal    Lab Data:                          10.5   6.16  )-----------( 366      ( 05 Oct 2018 08:13 )             33.0       10-05    140  |  106  |  8   ----------------------------<  84  4.5   |  25  |  0.75    Ca    9.1      05 Oct 2018 06:22  Phos  3.5     10-05  Mg     2.1     10-05    TPro  6.4  /  Alb  3.2<L>  /  TBili  0.2  /  DBili  <0.1  /  AST  <5<L>  /  ALT  6<L>  /  AlkPhos  54  10-04        Culture - Urine (collected 03 Oct 2018 22:13)  Source: .Urine Clean Catch (Midstream)  Preliminary Report (04 Oct 2018 17:41):    >100,000 CFU/ml Escherichia coli    Culture - Blood (collected 03 Oct 2018 21:52)  Source: .Blood Blood  Preliminary Report (04 Oct 2018 22:01):    No growth to date.    Culture - Blood (collected 03 Oct 2018 21:52)  Source: .Blood Blood  Preliminary Report (04 Oct 2018 22:01):    No growth to date.        < from: CT Abdomen and Pelvis w/ Oral Cont and w/ IV Cont (10.03.18 @ 18:20) >  IMPRESSION: Small pericardial effusion, slightly increased since   10/13/2017.    Heterogeneous enhancement of the kidneys bilaterally. In addition, there   is mild enhancement of the urothelium in the proximal right ureter.The   differential diagnosis includes infection such as pyelonephritis,   ischemia, and infiltrative processes such as lymphoma and leukemia.          < end of copied text >

## 2018-10-05 NOTE — BEHAVIORAL HEALTH ASSESSMENT NOTE - NSBHCHARTREVIEWLAB_PSY_A_CORE FT
10.5   6.16  )-----------( 366      ( 05 Oct 2018 08:13 )             33.0   10-05    140  |  106  |  8   ----------------------------<  84  4.5   |  25  |  0.75    Ca    9.1      05 Oct 2018 06:22  Phos  3.5     10-05  Mg     2.1     10-05    TPro  6.4  /  Alb  3.2<L>  /  TBili  0.2  /  DBili  <0.1  /  AST  <5<L>  /  ALT  6<L>  /  AlkPhos  54  10-04

## 2018-10-05 NOTE — BEHAVIORAL HEALTH ASSESSMENT NOTE - NSBHSOCIALHXDETAILSFT_PSY_A_CORE
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

## 2018-10-05 NOTE — BEHAVIORAL HEALTH ASSESSMENT NOTE - NSBHCHARTREVIEWIMAGING_PSY_A_CORE FT
CTH: 10/4  IMPRESSION:  No acute intracranial pathology is noted. If the patient has new and   persistent symptoms, consider short interval follow-up head CT or brain   MRI follow-up if there are no MRI contraindications.

## 2018-10-05 NOTE — CHART NOTE - NSCHARTNOTEFT_GEN_A_CORE
Medicine NP ([id number])    notified in AM by attending to obtain xray left hand.     NP s/w & examined pt.  left hand  Pt stated fell in ER 10/4/18 onto left hand; denied other injury. 6toaryan Gimenez notified today by NP.   Hx Prior fx Left 5th finger 1990's per pt. "finger not straight since then"  P/E: Left hand; DIP joint left 5th finger painful/ swollen/deformed with swelling/ecchymosis palmar aspect finger  good cap refill, skin warm/pink. FROM left wrist other  fingers,   A/P: Injury left 5th finger after fall 10/4/18  - xray left hand; r/o fx

## 2018-10-05 NOTE — CONSULT NOTE ADULT - SUBJECTIVE AND OBJECTIVE BOX
Urology Consult Note    Chief Complaint:  Hypotension      History of Present Illness:  63y female who comes to ed complains of feeling ill past week with body aches, sweats, chills di/no cough, nausea without vomiting. Reports recent syncope and hypotension. Denies hematuria, LUTS, dysuria. CT a/p demonstrates heterogeneous IV contrast enhancement of bilateral kidneys and right ureter.    PAST MEDICAL & SURGICAL HISTORY:  Anxiety  No significant past surgical history      FAMILY HISTORY:      Allergies    e-mycin,tetracycline,other antibiotic (Rash)  erythromycin (Rash)    Intolerances        Social History:  Denies tobacco or alcohol use    Review of Systems:   Constitutional: No weight loss, no weakness  HEENT: No visual loss, no hearing loss, no sneezing  Skin: No rash or itching  CV: No chest pain, no chest pressure  Pulm: No shortness of breath, cough, or sputum  GI: No melena, no constipation  : Per HPI  Neuro: No headache, dizziness  MSK: No muscle pain, no joint pain  Heme: No anemia, bruising, or bleeding  Lymphatics: No enlarged nodes, no history of splenectomy  Psych: No depression of anxiety  Endo: No cold or heat intolerance  Allergies: No asthma, hives    Physical Exam:  Vital signs  T(C): 36.7 (10-05-18 @ 11:30), Max: 36.8 (10-04-18 @ 20:02)  HR: 55 (10-05-18 @ 04:33)  BP: 120/63 (10-05-18 @ 04:33)  SpO2: 98% (10-05-18 @ 11:30)  Wt(kg): --    Gen: No acute distress. Normal mood  HEENT: Normocephalic, neck supple  CV: Hemodynamically stable  Pulm: No increased work of breathing  Abd: soft, non-tender, non-distended  Back: No CVA tenderness, no midline pain  Extremities: No significant deformity or joint abnormality  Neuro: Alert & oriented x3, No focal deficits  Skin: Skin normal color, normal texture  Psych: Normal affect, normal behavior  : Nonpalpable bladder      Labs:      10-05 @ 08:13    WBC 6.16  / Hct 33.0  / SCr --       10-05 @ 06:22    WBC --    / Hct --    / SCr 0.75     10-05    140  |  106  |  8   ----------------------------<  84  4.5   |  25  |  0.75    Ca    9.1      05 Oct 2018 06:22  Phos  3.5     10-05  Mg     2.1     10-05    TPro  6.4  /  Alb  3.2<L>  /  TBili  0.2  /  DBili  <0.1  /  AST  <5<L>  /  ALT  6<L>  /  AlkPhos  54  10-04      CT a/p:  KIDNEYS/URETERS: No hydronephrosis. Heterogeneous enhancement of the   kidneys bilaterally, with peripheral, somewhat wedge-shaped, areas of   decreased attenuation. Mild enhancement of the urothelium in the proximal   right ureter. A subcentimeter hypodense lesion in the upper pole of the   right kidney is unchanged since prior studies.

## 2018-10-05 NOTE — BEHAVIORAL HEALTH ASSESSMENT NOTE - HPI (INCLUDE ILLNESS QUALITY, SEVERITY, DURATION, TIMING, CONTEXT, MODIFYING FACTORS, ASSOCIATED SIGNS AND SYMPTOMS)
63 year old female, single, retired, domiciled with PPH significant for depression, anxiety, PTSD and no significant PMH presents initially with dizziness. Found to have presumptive pyelonephritis.  Psych CL consulted to assist with anxiety, depressive symptoms.    Patient seen this afternoon. She goes over her past history, discusses her history of depression, anxiety. States that it has been more difficult of late. She describes neurovegetative symptoms. She denies SI/HI currently. No AH/VH. No manic symptoms currently elicited. She is compliant with lexapro 30mg daily. She had been taking gabapentin 300mg HS. States that she is not sleeping well.  She is open to trying mirtazapine to help with sleep.

## 2018-10-05 NOTE — BEHAVIORAL HEALTH ASSESSMENT NOTE - RISK ASSESSMENT
Patient denies SI, has no history of SA, is sober, has protective factors against suicide. She is low risk for self harm.

## 2018-10-05 NOTE — PROGRESS NOTE ADULT - SUBJECTIVE AND OBJECTIVE BOX
Subjective: Patient seen and examined. No new events except as noted.   "I just don't feels wll but better than yesterday. " c/o diffuse aches and pains feeling ill no abdominal pain some left flank pain  no cp or sob has anxiety and depression  MEDICATIONS:  MEDICATIONS  (STANDING):  cefTRIAXone   IVPB 1 Gram(s) IV Intermittent every 12 hours  escitalopram 30 milliGRAM(s) Oral daily  heparin  Injectable 5000 Unit(s) SubCutaneous every 12 hours  sodium chloride 0.9%. 1000 milliLiter(s) (100 mL/Hr) IV Continuous <Continuous>      PHYSICAL EXAM:  T(C): 36.7 (10-05-18 @ 11:30), Max: 36.8 (10-04-18 @ 20:02)  HR: 55 (10-05-18 @ 04:33) (50 - 94)  BP: 120/63 (10-05-18 @ 04:33) (104/67 - 124/73)  RR: 18 (10-05-18 @ 11:30) (16 - 18)  SpO2: 98% (10-05-18 @ 11:30) (95% - 99%)  Wt(kg): --  I&O's Summary    04 Oct 2018 07:01  -  05 Oct 2018 07:00  --------------------------------------------------------  IN: 1440 mL / OUT: 0 mL / NET: 1440 mL    05 Oct 2018 07:01  -  05 Oct 2018 14:08  --------------------------------------------------------  IN: 440 mL / OUT: 0 mL / NET: 440 mL          Appearance: Normal	  HEENT:   Normal oral mucosa, PERRL, EOMI	  Cardiovascular: Normal S1 S2, No JVD, No murmurs ,  Respiratory: Lungs clear to auscultation, normal effort 	  Gastrointestinal:  Soft, Non-tender, + BS	  Skin: No rashes, No ecchymoses, No cyanosis, warm to touch  Musculoskeletal: Normal range of motion, normal strength  Psychiatry:  Mood & affect appropriate  Ext: No edema  Peripheral pulses palpable 2+ bilaterally      LABS:    CARDIAC MARKERS:  CARDIAC MARKERS ( 04 Oct 2018 12:11 )  x     / x     / 30 U/L / x     / 1.0 ng/mL                                10.5   6.16  )-----------( 366      ( 05 Oct 2018 08:13 )             33.0     10-05    140  |  106  |  8   ----------------------------<  84  4.5   |  25  |  0.75    Ca    9.1      05 Oct 2018 06:22  Phos  3.5     10-05  Mg     2.1     10-05    TPro  6.4  /  Alb  3.2<L>  /  TBili  0.2  /  DBili  <0.1  /  AST  <5<L>  /  ALT  6<L>  /  AlkPhos  54  10-04    proBNP:   Lipid Profile:   HgA1c:   TSH:     ECG NSR WNL

## 2018-10-06 LAB
ANION GAP SERPL CALC-SCNC: 12 MMOL/L — SIGNIFICANT CHANGE UP (ref 5–17)
BUN SERPL-MCNC: 10 MG/DL — SIGNIFICANT CHANGE UP (ref 7–23)
CALCIUM SERPL-MCNC: 9.1 MG/DL — SIGNIFICANT CHANGE UP (ref 8.4–10.5)
CHLORIDE SERPL-SCNC: 109 MMOL/L — HIGH (ref 96–108)
CO2 SERPL-SCNC: 24 MMOL/L — SIGNIFICANT CHANGE UP (ref 22–31)
CREAT SERPL-MCNC: 0.78 MG/DL — SIGNIFICANT CHANGE UP (ref 0.5–1.3)
CRP SERPL-MCNC: 2.8 MG/DL — HIGH (ref 0–0.4)
ERYTHROCYTE [SEDIMENTATION RATE] IN BLOOD: 60 MM/HR — HIGH (ref 0–20)
GLUCOSE SERPL-MCNC: 87 MG/DL — SIGNIFICANT CHANGE UP (ref 70–99)
HCT VFR BLD CALC: 36.2 % — SIGNIFICANT CHANGE UP (ref 34.5–45)
HGB BLD-MCNC: 11.5 G/DL — SIGNIFICANT CHANGE UP (ref 11.5–15.5)
MAGNESIUM SERPL-MCNC: 2 MG/DL — SIGNIFICANT CHANGE UP (ref 1.6–2.6)
MCHC RBC-ENTMCNC: 28.5 PG — SIGNIFICANT CHANGE UP (ref 27–34)
MCHC RBC-ENTMCNC: 31.8 GM/DL — LOW (ref 32–36)
MCV RBC AUTO: 89.6 FL — SIGNIFICANT CHANGE UP (ref 80–100)
PLATELET # BLD AUTO: 400 K/UL — SIGNIFICANT CHANGE UP (ref 150–400)
POTASSIUM SERPL-MCNC: 4.2 MMOL/L — SIGNIFICANT CHANGE UP (ref 3.5–5.3)
POTASSIUM SERPL-SCNC: 4.2 MMOL/L — SIGNIFICANT CHANGE UP (ref 3.5–5.3)
RBC # BLD: 4.04 M/UL — SIGNIFICANT CHANGE UP (ref 3.8–5.2)
RBC # FLD: 13.4 % — SIGNIFICANT CHANGE UP (ref 10.3–14.5)
RHEUMATOID FACT SERPL-ACNC: <10 IU/ML — SIGNIFICANT CHANGE UP (ref 0–13)
SODIUM SERPL-SCNC: 145 MMOL/L — SIGNIFICANT CHANGE UP (ref 135–145)
WBC # BLD: 5.37 K/UL — SIGNIFICANT CHANGE UP (ref 3.8–10.5)
WBC # FLD AUTO: 5.37 K/UL — SIGNIFICANT CHANGE UP (ref 3.8–10.5)

## 2018-10-06 RX ORDER — ONDANSETRON 8 MG/1
4 TABLET, FILM COATED ORAL ONCE
Qty: 0 | Refills: 0 | Status: COMPLETED | OUTPATIENT
Start: 2018-10-06 | End: 2018-10-06

## 2018-10-06 RX ORDER — INFLUENZA VIRUS VACCINE 15; 15; 15; 15 UG/.5ML; UG/.5ML; UG/.5ML; UG/.5ML
0.5 SUSPENSION INTRAMUSCULAR ONCE
Qty: 0 | Refills: 0 | Status: COMPLETED | OUTPATIENT
Start: 2018-10-06 | End: 2018-10-19

## 2018-10-06 RX ADMIN — HEPARIN SODIUM 5000 UNIT(S): 5000 INJECTION INTRAVENOUS; SUBCUTANEOUS at 05:47

## 2018-10-06 RX ADMIN — HEPARIN SODIUM 5000 UNIT(S): 5000 INJECTION INTRAVENOUS; SUBCUTANEOUS at 18:32

## 2018-10-06 RX ADMIN — Medication 0.6 MILLIGRAM(S): at 18:32

## 2018-10-06 RX ADMIN — Medication 600 MILLIGRAM(S): at 23:08

## 2018-10-06 RX ADMIN — ESCITALOPRAM OXALATE 30 MILLIGRAM(S): 10 TABLET, FILM COATED ORAL at 11:47

## 2018-10-06 RX ADMIN — Medication 0.6 MILLIGRAM(S): at 05:47

## 2018-10-06 RX ADMIN — CEFTRIAXONE 100 GRAM(S): 500 INJECTION, POWDER, FOR SOLUTION INTRAMUSCULAR; INTRAVENOUS at 18:32

## 2018-10-06 RX ADMIN — Medication 600 MILLIGRAM(S): at 15:07

## 2018-10-06 RX ADMIN — MIRTAZAPINE 7.5 MILLIGRAM(S): 45 TABLET, ORALLY DISINTEGRATING ORAL at 23:09

## 2018-10-06 RX ADMIN — Medication 600 MILLIGRAM(S): at 07:00

## 2018-10-06 RX ADMIN — Medication 600 MILLIGRAM(S): at 14:07

## 2018-10-06 RX ADMIN — ONDANSETRON 4 MILLIGRAM(S): 8 TABLET, FILM COATED ORAL at 23:08

## 2018-10-06 RX ADMIN — Medication 600 MILLIGRAM(S): at 05:47

## 2018-10-06 RX ADMIN — CEFTRIAXONE 100 GRAM(S): 500 INJECTION, POWDER, FOR SOLUTION INTRAMUSCULAR; INTRAVENOUS at 05:46

## 2018-10-06 NOTE — CHART NOTE - NSCHARTNOTEFT_GEN_A_CORE
Medicine NP    Followed up Xray of Left Hand  to r/o fx s/p fall. Xray with 5th digit avulsion fracture of the distal phalangeal base.    Pt w/ prior history of  fx to Left 5th finger 1990's per pt. "finger not straight since then"    P/E: Left hand; DIP joint left 5th finger painful with tactile stimuli +deformity of DIP w/ noted flexion LROM at distal region, otherwise full ROM at the medial joint  with minimal localized swelling at DIP, resolving ecchymosis at palmar aspect of finger,   good cap refill, + Radial pulse, skin warm/pink.   1st - 4th digits of the L hand with no noted swelling/ ecchymosis/ FROM    < from: Xray Hand 3 Views, Left (10.05.18 @ 16:36) >    Avulsion fracture at the fifth distal phalangeal base with swan-neck   deformity of the fifth the IP    < end of copied text >      A/P: 64 yo female PMHx anxiety presents to ED c/o generalized fatigue, body aches, chills and "feeling unwell" for the last week. Found with large pericardial effusion on Echo. Pt s/p mechanical fall with Injury to left Hand 5th finger on 10/4/18  - xray left hand; r/o fx. Medicine NP    Followed up Xray of Left Hand  to r/o fx s/p fall. Xray with 5th digit avulsion fracture of the distal phalangeal base.    Pt w/ prior history of  fx to Left 5th finger 1990's per pt. "finger not straight since then"    P/E: Left hand; DIP joint left 5th finger painful with tactile stimuli +deformity of DIP w/ noted flexion LROM at distal region, otherwise full ROM at the medial joint  with minimal localized swelling at DIP, resolving ecchymosis at palmar aspect of finger,   good cap refill, + Radial pulse, skin warm/pink.   1st - 4th digits of the L hand with no noted swelling/ ecchymosis/ FROM    < from: Xray Hand 3 Views, Left (10.05.18 @ 16:36) >    Avulsion fracture at the fifth distal phalangeal base with swan-neck   deformity of the fifth the IP    < end of copied text >      A/P: 64 yo female PMHx anxiety admitted on 10/3/18 c/o generalized fatigue, body aches, chills and "feeling unwell" for the last week. Found with large pericardial effusion on Echo.  Pt s/p mechanical fall with Injury to left Hand 5th finger on 10/4/18 now xray of Hand w/ avulsion fracture.     - Dr. Berkowitz notified, agrees with calling Ortho consult.  Spoke with Ortho deferred consult to Plastic/ Hand Ortho, spoke with Dr. Gary Chicas, no surgical intervention needed,  informed to splint 5th digit for couple of weeks.     Luis Miguel Prakash, AIDA  [id number] Medicine NP    Followed up Xray of Left Hand  to r/o fx s/p fall. Xray with 5th digit avulsion fracture of the distal phalangeal base.    Pt w/ prior history of  fx to Left 5th finger 1990's per pt. "finger not straight since then"    P/E: Left hand; DIP joint left 5th finger painful with tactile stimuli +deformity of DIP w/ noted flexion LROM at distal region, otherwise full ROM at the medial joint  with minimal localized swelling at DIP, resolving ecchymosis at palmar aspect of finger,   good cap refill, + Radial pulse, skin warm/pink.   1st - 4th digits of the L hand with no noted swelling/ ecchymosis/ FROM    < from: Xray Hand 3 Views, Left (10.05.18 @ 16:36) >    Avulsion fracture at the fifth distal phalangeal base with swan-neck   deformity of the fifth the IP    < end of copied text >      A/P: 62 yo female PMHx anxiety admitted on 10/3/18 c/o generalized fatigue, body aches, chills and "feeling unwell" for the last week. Found with large pericardial effusion on Echo.  Pt s/p mechanical fall with Injury to left Hand 5th finger on 10/4/18 now xray of Hand w/ avulsion fracture.     - Dr. Berkowitz notified, agrees with calling Ortho consult.  Spoke with Ortho deferred consult to Plastic/ Hand Ortho, spoke with Dr. Gary Chicas, no surgical intervention needed,  informed to splint 5th digit for couple of weeks. Pt  verbalizes she's upset at her present symptoms and wants to hold off splinting for now.    Luis Miguel Prakash, NP  [id number]

## 2018-10-06 NOTE — PROGRESS NOTE ADULT - SUBJECTIVE AND OBJECTIVE BOX
Subjective: Patient seen and examined. No new events except as noted.   Feels somewhat better slightly nauseated and dizzy now at 12:30. still with right flank pain less diffuse aches and pains mood improved  echo loculated pericardial effusion no tamponade  elevated ESR rheum consult noted  MEDICATIONS:  MEDICATIONS  (STANDING):  cefTRIAXone   IVPB 1 Gram(s) IV Intermittent every 12 hours  colchicine 0.6 milliGRAM(s) Oral two times a day  escitalopram 30 milliGRAM(s) Oral daily  heparin  Injectable 5000 Unit(s) SubCutaneous every 12 hours  ibuprofen  Tablet. 600 milliGRAM(s) Oral three times a day  mirtazapine 7.5 milliGRAM(s) Oral at bedtime  sodium chloride 0.9%. 1000 milliLiter(s) (100 mL/Hr) IV Continuous <Continuous>      PHYSICAL EXAM:  T(C): 36.4 (10-06-18 @ 10:28), Max: 37.2 (10-06-18 @ 06:30)  HR: 65 (10-06-18 @ 10:28) (53 - 65)  BP: 136/75 (10-06-18 @ 10:28) (121/68 - 147/78)  RR: 18 (10-06-18 @ 10:28) (18 - 18)  SpO2: 97% (10-06-18 @ 10:28) (97% - 98%)  Wt(kg): --  I&O's Summary    05 Oct 2018 07:01  -  06 Oct 2018 07:00  --------------------------------------------------------  IN: 1440 mL / OUT: 0 mL / NET: 1440 mL    06 Oct 2018 07:01  -  06 Oct 2018 12:40  --------------------------------------------------------  IN: 400 mL / OUT: 0 mL / NET: 400 mL          Appearance: Normal less anxious c/o nausea and some dizziness	  HEENT:   Normal oral mucosa, PERRL, EOMI	  Cardiovascular: Normal S1 S2, No JVD, No murmurs ,no rub  Respiratory: Lungs clear to auscultation, normal effort 	  Gastrointestinal:  Soft, Non-tender, + BS	  Skin: No rashes, No ecchymoses, No cyanosis, warm to touch  Musculoskeletal: right flank pain  Psychiatry:  Mood & affect appropriate  Ext: No edema  Peripheral pulses palpable 2+ bilaterally      LABS:    CARDIAC MARKERS:  CARDIAC MARKERS ( 04 Oct 2018 12:11 )  x     / x     / 30 U/L / x     / 1.0 ng/mL                                11.5   5.37  )-----------( 400      ( 06 Oct 2018 07:50 )             36.2     10-06    145  |  109<H>  |  10  ----------------------------<  87  4.2   |  24  |  0.78    Ca    9.1      06 Oct 2018 06:49  Phos  3.5     10-05  Mg     2.0     10-06      ESR 60      TELEMETRY: 	    ECG: WNL no tachycardia 	    < from: Transthoracic Echocardiogram (10.05.18 @ 14:38) >  Conclusions:  1. Mitral annular calcification, otherwise normal mitral  valve. Minimal mitral regurgitation.  2. Calcified trileaflet aortic valve with normal opening.  3. Normal left ventricular internal dimensions and wall  thicknesses.  4. Normal left ventricular systolic function. No segmental  wall motion abnormalities.  5. Normal right ventricular size and function.  6. Large loculated pericardial effusion anterior to the  right heart. The IVC is enlarged but collapses with sniff.  No obvious signs of increased intra-pericardial pressures.  *** No previous Echo exam.  ------------------------------------------------------------------------  Confirmed on  10/5/2018 - 17:11:38 by Cristobal Stokes MD  ------------------------------------------------------------------------    < end of copied text >

## 2018-10-06 NOTE — PROGRESS NOTE ADULT - SUBJECTIVE AND OBJECTIVE BOX
CHIEF COMPLAINT:Patient is a 63y old  Female who presents with a chief complaint of hypotension (04 Oct 2018 19:54)  no acute events  	        PAST MEDICAL & SURGICAL HISTORY:          REVIEW OF SYSTEMS:  CONSTITUTIONAL:feels better  EYES: No eye pain, visual disturbances, or discharge  NECK: No pain or stiffness  RESPIRATORY: No cough, wheezing, chills or hemoptysis; No Shortness of Breath  CARDIOVASCULAR: No chest pain, palpitations, passing out, dizziness, or leg swelling  GASTROINTESTINAL: No abdominal or epigastric pain. No nausea, vomiting, or hematemesis; No diarrhea or constipation. No melena or hematochezia.  GENITOURINARY: No dysuria, frequency, hematuria, or incontinence  less cva pain  NEUROLOGICAL: No headaches, memory loss, loss of strength, numbness, or tremors  MUSCULOSKELETAL: No joint pain or swelling; No muscle, back, or extremity pain      Medications:  MEDICATIONS  (STANDING):  cefTRIAXone   IVPB 1 Gram(s) IV Intermittent every 12 hours  colchicine 0.6 milliGRAM(s) Oral two times a day  escitalopram 30 milliGRAM(s) Oral daily  heparin  Injectable 5000 Unit(s) SubCutaneous every 12 hours  ibuprofen  Tablet. 600 milliGRAM(s) Oral three times a day  influenza   Vaccine 0.5 milliLiter(s) IntraMuscular once  mirtazapine 7.5 milliGRAM(s) Oral at bedtime  sodium chloride 0.9%. 1000 milliLiter(s) (100 mL/Hr) IV Continuous <Continuous>    MEDICATIONS  (PRN):  meclizine 12.5 milliGRAM(s) Oral every 8 hours PRN Dizziness    	    PHYSICAL EXAM:  T(C): 36.9 (10-06-18 @ 20:05), Max: 37.2 (10-06-18 @ 06:30)  HR: 57 (10-06-18 @ 20:05) (53 - 65)  BP: 120/74 (10-06-18 @ 20:05) (120/74 - 136/75)  RR: 18 (10-06-18 @ 20:05) (18 - 18)  SpO2: 97% (10-06-18 @ 20:05) (97% - 98%)  Wt(kg): --  I&O's Summary    05 Oct 2018 07:01  -  06 Oct 2018 07:00  --------------------------------------------------------  IN: 1440 mL / OUT: 0 mL / NET: 1440 mL    06 Oct 2018 07:01  -  06 Oct 2018 23:15  --------------------------------------------------------  IN: 1470 mL / OUT: 0 mL / NET: 1470 mL      Appearance: Normal	  HEENT:   Normal oral mucosa, PERRL, EOMI	  Lymphatic: No lymphadenopathy  Cardiovascular: Normal S1 S2, No JVD, No murmurs, No edema  Respiratory: Lungs clear to auscultation	  Psychiatry: A & O x 3, Mood & affect appropriate  Gastrointestinal:  Soft, Non-tender, + BS	  Skin: No rashes, No ecchymoses, No cyanosis	  Neurologic: Non-focal  Extremities: Normal range of motion, No clubbing, cyanosis or edema  Vascular: Peripheral pulses palpable 2+ bilaterally    LABS:	 	    CARDIAC MARKERS:                                11.5   5.37  )-----------( 400      ( 06 Oct 2018 07:50 )             36.2     10-06    145  |  109<H>  |  10  ----------------------------<  87  4.2   |  24  |  0.78    Ca    9.1      06 Oct 2018 06:49  Phos  3.5     10-05  Mg     2.0     10-06      proBNP:   Lipid Profile:   HgA1c:   TSH:

## 2018-10-06 NOTE — PROGRESS NOTE ADULT - SUBJECTIVE AND OBJECTIVE BOX
NEUROLOGY:  Still feeling dizzy. Otherwise no new complaints.     Medications:  cefTRIAXone   IVPB 1 Gram(s) IV Intermittent every 12 hours  colchicine 0.6 milliGRAM(s) Oral two times a day  escitalopram 30 milliGRAM(s) Oral daily  heparin  Injectable 5000 Unit(s) SubCutaneous every 12 hours  ibuprofen  Tablet. 600 milliGRAM(s) Oral three times a day  meclizine 12.5 milliGRAM(s) Oral every 8 hours PRN  mirtazapine 7.5 milliGRAM(s) Oral at bedtime  sodium chloride 0.9%. 1000 milliLiter(s) IV Continuous <Continuous>      Labs:  CBC Full  -  ( 06 Oct 2018 07:50 )  WBC Count : 5.37 K/uL  Hemoglobin : 11.5 g/dL  Hematocrit : 36.2 %  Platelet Count - Automated : 400 K/uL  Mean Cell Volume : 89.6 fl  Mean Cell Hemoglobin : 28.5 pg  Mean Cell Hemoglobin Concentration : 31.8 gm/dL  Auto Neutrophil # : x  Auto Lymphocyte # : x  Auto Monocyte # : x  Auto Eosinophil # : x  Auto Basophil # : x  Auto Neutrophil % : x  Auto Lymphocyte % : x  Auto Monocyte % : x  Auto Eosinophil % : x  Auto Basophil % : x    10-06    145  |  109<H>  |  10  ----------------------------<  87  4.2   |  24  |  0.78    Ca    9.1      06 Oct 2018 06:49  Phos  3.5     10-05  Mg     2.0     10-06      CAPILLARY BLOOD GLUCOSE                  Vitals:  Vital Signs Last 24 Hrs  T(C): 36.4 (06 Oct 2018 10:28), Max: 37.2 (06 Oct 2018 06:30)  T(F): 97.6 (06 Oct 2018 10:28), Max: 98.9 (06 Oct 2018 06:30)  HR: 65 (06 Oct 2018 10:28) (53 - 65)  BP: 136/75 (06 Oct 2018 10:28) (121/68 - 147/78)  BP(mean): --  RR: 18 (06 Oct 2018 10:28) (18 - 18)  SpO2: 97% (06 Oct 2018 10:28) (97% - 98%)    NEUROLOGICAL EXAM:    Mental status: Awake, alert, and in no apparent distress. Oriented x3. Language function is normal.    Cranial Nerves: Pupils were equal, round, reactive to light. Extraocular movements were intact but uncomfortable with eye movements, closes eyes often and difficult to evaluate for nystagmus. Visual field were full. Fundoscopic exam was deferred. Facial sensation was intact to light touch. There was no facial asymmetry. The palate was upgoing symmetrically and tongue was midline.     Motor exam: Bulk and tone were normal. Strength was 5/5 in all four extremities. Fine finger movements were symmetric and normal. There was no pronator drift    Reflexes: 2+ in the bilateral upper extremities. 1+ in the bilateral lower extremities. Toes were downgoing bilaterally.     Sensation: Intact to light touch    Coordination: Finger-nose-finger without dysmetria.     Gait: deferred    NIHSS=0.    < from: CT Head No Cont (10.04.18 @ 10:34) >  EXAM:  CT BRAIN                            PROCEDURE DATE:  10/04/2018            INTERPRETATION:  History: Syncope. Headaches. Hit head.    Description: A noncontrast head CT was performed. Axial images were   performed from the skull base to the vertex with coronal/sagittal   reconstructions.    Comparison is made to a brain MRI from 12/04/2017, head CT 10/19/2007.    There is no evidence for acute infarct, acute hemorrhage, mass effect,   calvarial fracture, or hydrocephalus.    Minimal patchy hypodensity without mass effect is noted in the   periventricular white matter which most likely represents chronic   microvascular ischemic changes given the patient's age.    Cerebral volume loss is present with secondary proportional prominence of   the sulci and ventricles.    No lytic or blastic calvarial lesions are noted. The visualized portions   of the paranasal sinuses and mastoid air cells are clear.    IMPRESSION:    No acute intracranial pathology is noted. If the patient has new and   persistent symptoms, consider short interval follow-up head CT or brain   MRI follow-up if there are no MRI contraindications.    JADEN COOK M.D., ATTENDING RADIOLOGIST  This document has been electronically signed. Oct  4 2018 10:40AM

## 2018-10-07 LAB
ANION GAP SERPL CALC-SCNC: 9 MMOL/L — SIGNIFICANT CHANGE UP (ref 5–17)
BUN SERPL-MCNC: 11 MG/DL — SIGNIFICANT CHANGE UP (ref 7–23)
CALCIUM SERPL-MCNC: 9.2 MG/DL — SIGNIFICANT CHANGE UP (ref 8.4–10.5)
CHLORIDE SERPL-SCNC: 107 MMOL/L — SIGNIFICANT CHANGE UP (ref 96–108)
CO2 SERPL-SCNC: 28 MMOL/L — SIGNIFICANT CHANGE UP (ref 22–31)
CREAT SERPL-MCNC: 0.87 MG/DL — SIGNIFICANT CHANGE UP (ref 0.5–1.3)
GLUCOSE SERPL-MCNC: 84 MG/DL — SIGNIFICANT CHANGE UP (ref 70–99)
HCT VFR BLD CALC: 37.2 % — SIGNIFICANT CHANGE UP (ref 34.5–45)
HGB BLD-MCNC: 12 G/DL — SIGNIFICANT CHANGE UP (ref 11.5–15.5)
MCHC RBC-ENTMCNC: 28.9 PG — SIGNIFICANT CHANGE UP (ref 27–34)
MCHC RBC-ENTMCNC: 32.3 GM/DL — SIGNIFICANT CHANGE UP (ref 32–36)
MCV RBC AUTO: 89.6 FL — SIGNIFICANT CHANGE UP (ref 80–100)
PLATELET # BLD AUTO: 383 K/UL — SIGNIFICANT CHANGE UP (ref 150–400)
POTASSIUM SERPL-MCNC: 4.9 MMOL/L — SIGNIFICANT CHANGE UP (ref 3.5–5.3)
POTASSIUM SERPL-SCNC: 4.9 MMOL/L — SIGNIFICANT CHANGE UP (ref 3.5–5.3)
RBC # BLD: 4.15 M/UL — SIGNIFICANT CHANGE UP (ref 3.8–5.2)
RBC # FLD: 13.9 % — SIGNIFICANT CHANGE UP (ref 10.3–14.5)
SODIUM SERPL-SCNC: 144 MMOL/L — SIGNIFICANT CHANGE UP (ref 135–145)
WBC # BLD: 6.6 K/UL — SIGNIFICANT CHANGE UP (ref 3.8–10.5)
WBC # FLD AUTO: 6.6 K/UL — SIGNIFICANT CHANGE UP (ref 3.8–10.5)

## 2018-10-07 PROCEDURE — 93321 DOPPLER ECHO F-UP/LMTD STD: CPT | Mod: 26

## 2018-10-07 PROCEDURE — 99232 SBSQ HOSP IP/OBS MODERATE 35: CPT

## 2018-10-07 PROCEDURE — 93308 TTE F-UP OR LMTD: CPT | Mod: 26

## 2018-10-07 RX ORDER — ONDANSETRON 8 MG/1
4 TABLET, FILM COATED ORAL ONCE
Qty: 0 | Refills: 0 | Status: COMPLETED | OUTPATIENT
Start: 2018-10-07 | End: 2018-10-07

## 2018-10-07 RX ADMIN — HEPARIN SODIUM 5000 UNIT(S): 5000 INJECTION INTRAVENOUS; SUBCUTANEOUS at 17:41

## 2018-10-07 RX ADMIN — Medication 12.5 MILLIGRAM(S): at 23:48

## 2018-10-07 RX ADMIN — Medication 0.6 MILLIGRAM(S): at 06:25

## 2018-10-07 RX ADMIN — Medication 0.6 MILLIGRAM(S): at 17:41

## 2018-10-07 RX ADMIN — Medication 600 MILLIGRAM(S): at 07:31

## 2018-10-07 RX ADMIN — CEFTRIAXONE 100 GRAM(S): 500 INJECTION, POWDER, FOR SOLUTION INTRAMUSCULAR; INTRAVENOUS at 06:25

## 2018-10-07 RX ADMIN — Medication 600 MILLIGRAM(S): at 15:18

## 2018-10-07 RX ADMIN — Medication 600 MILLIGRAM(S): at 06:25

## 2018-10-07 RX ADMIN — HEPARIN SODIUM 5000 UNIT(S): 5000 INJECTION INTRAVENOUS; SUBCUTANEOUS at 06:26

## 2018-10-07 RX ADMIN — CEFTRIAXONE 100 GRAM(S): 500 INJECTION, POWDER, FOR SOLUTION INTRAMUSCULAR; INTRAVENOUS at 17:41

## 2018-10-07 RX ADMIN — ONDANSETRON 4 MILLIGRAM(S): 8 TABLET, FILM COATED ORAL at 23:48

## 2018-10-07 RX ADMIN — MIRTAZAPINE 7.5 MILLIGRAM(S): 45 TABLET, ORALLY DISINTEGRATING ORAL at 23:06

## 2018-10-07 RX ADMIN — SODIUM CHLORIDE 100 MILLILITER(S): 9 INJECTION INTRAMUSCULAR; INTRAVENOUS; SUBCUTANEOUS at 06:26

## 2018-10-07 RX ADMIN — ESCITALOPRAM OXALATE 30 MILLIGRAM(S): 10 TABLET, FILM COATED ORAL at 11:43

## 2018-10-07 RX ADMIN — ONDANSETRON 4 MILLIGRAM(S): 8 TABLET, FILM COATED ORAL at 12:39

## 2018-10-07 RX ADMIN — Medication 600 MILLIGRAM(S): at 23:06

## 2018-10-07 RX ADMIN — Medication 600 MILLIGRAM(S): at 14:00

## 2018-10-07 RX ADMIN — Medication 12.5 MILLIGRAM(S): at 12:02

## 2018-10-07 RX ADMIN — Medication 600 MILLIGRAM(S): at 00:08

## 2018-10-07 NOTE — CHART NOTE - NSCHARTNOTEFT_GEN_A_CORE
MEDICINE NP     BAYLEE PRUITT  63y Female  Patient is a 63y old  Female who presents with a chief complaint of UTI (07 Oct 2018 08:00)    Followed up Xray of Left Hand  to r/o fx s/p fall. Xray with 5th digit avulsion fracture of the distal phalangeal base.    < from: Xray Hand 3 Views, Left (10.05.18 @ 16:36) >    EXAM:  HAND LEFT (MINIMUM 3 VIEWS)                            PROCEDURE DATE:  10/05/2018      INTERPRETATION:  INDICATION: r/o fx    COMPARISON: None available    FINDING: Three views of the left hand demonstrate a small avulsion   fracture at the dorsal aspect of the fifth distal phalangeal base.. There   is a swan-neck deformity of the fifth DIP. There is narrowing and   productive change at the fifth DIP.     IMPRESSION:   Avulsion fracture at the fifth distal phalangeal base with swan-neck   deformity of the fifth the IP    RYAN CARRANZA M.D., ATTENDING RADIOLOGIST  This document has been electronically signed. Oct  6 2018  4:59PM    < end of copied text >    P/E: Left hand; DIP joint left 5th finger painful with tactile stimuli +deformity of DIP w/ noted flexion LROM at distal region, otherwise full ROM at the medial joint with minimal localized swelling at DIP, resolving ecchymosis at palmar aspect of finger, good cap refill, + Radial pulse, skin warm/pink.   1st - 4th digits of the L hand with no noted swelling/ ecchymosis with full ROM     A/P :64 yo female PMHx anxiety admitted on 10/3/18 c/o generalized fatigue, body aches, chills and "feeling unwell" for the last week. Found with large pericardial effusion on Echo.  Pt s/p mechanical fall with Injury to left Hand 5th finger on 10/4/18 now xray of Hand w/ avulsion fracture.     Patient agreeable for placement of splint to left hand 5th finger.   > Call to orthopedic team who referred to the on-call  Hand specialist   > Call placed to Dr. Chuck Chicas Plastic Surgeon on-call on 10/6/18 and was informed to placed splint   > Call to Dr. Berkowitz to recall Dr. Chicas for splint placement   > Discussed with Dr. Chicas patient is agreeable for splint placement, will see patient in am   Will continue to monitor     Carlene Gamboa DNP- C  [id number] MEDICINE NP     BAYLEE PRUITT  63y Female  Patient is a 63y old  Female who presents with a chief complaint of UTI (07 Oct 2018 08:00)    Followed up Xray of Left Hand  to r/o fx s/p fall. Xray with 5th digit avulsion fracture of the distal phalangeal base.    < from: Xray Hand 3 Views, Left (10.05.18 @ 16:36) >    EXAM:  HAND LEFT (MINIMUM 3 VIEWS)                            PROCEDURE DATE:  10/05/2018      INTERPRETATION:  INDICATION: r/o fx    COMPARISON: None available    FINDING: Three views of the left hand demonstrate a small avulsion   fracture at the dorsal aspect of the fifth distal phalangeal base.. There   is a swan-neck deformity of the fifth DIP. There is narrowing and   productive change at the fifth DIP.     IMPRESSION:   Avulsion fracture at the fifth distal phalangeal base with swan-neck   deformity of the fifth the IP    RYAN CARRANZA M.D., ATTENDING RADIOLOGIST  This document has been electronically signed. Oct  6 2018  4:59PM    < end of copied text >    P/E: Left hand; DIP joint left 5th finger painful with tactile stimuli +deformity of DIP w/ noted flexion LROM at distal region, otherwise full ROM at the medial joint with minimal localized swelling at DIP, resolving ecchymosis at palmar aspect of finger, good cap refill, + Radial pulse, skin warm/pink.   1st - 4th digits of the L hand with no noted swelling/ ecchymosis with full ROM     A/P :64 yo female PMHx anxiety admitted on 10/3/18 c/o generalized fatigue, body aches, chills and "feeling unwell" for the last week. Found with large pericardial effusion on Echo.  Pt s/p mechanical fall with Injury to left Hand 5th finger on 10/4/18 now xray of Hand w/ avulsion fracture.     Patient agreeable for placement of splint to left hand 5th finger.   > Call to orthopedic team who referred to the on-call  Hand specialist   > Call placed to Dr. Chuck Chicas Plastic Surgeon on-call on 10/6/18 and was informed  NP to place splint       at which time Dr. Chicas was made aware that  medicine NP's are not credential in performing this task     > Call to Dr. Berkowitz to recall Dr. Chicas for splint placement   > Discussed with Dr. Chicas patient is agreeable for splint placement, will see patient in am   Will continue to monitor     Carlene Gamboa DNP- C  [id number]

## 2018-10-07 NOTE — PROGRESS NOTE ADULT - SUBJECTIVE AND OBJECTIVE BOX
Subjective: Patient seen and examined. No new events except as noted.   ppatient has Escherichi coli in urine  Still has some left flank pain  Complaining of dizziness despite a normal blood essure and nausea when she admits that she is overall improved  Her anxiety level is high  Limited repeat echocardiogram again reveals lcalized large pericardial effusiion no change from before,, no clear tamponade but some degree of rightventricular compression  CRP elevated  MEDICATIONS:  MEDICATIONS  (STANDING):  cefTRIAXone   IVPB 1 Gram(s) IV Intermittent every 12 hours  colchicine 0.6 milliGRAM(s) Oral two times a day  escitalopram 30 milliGRAM(s) Oral daily  heparin  Injectable 5000 Unit(s) SubCutaneous every 12 hours  ibuprofen  Tablet. 600 milliGRAM(s) Oral three times a day  influenza   Vaccine 0.5 milliLiter(s) IntraMuscular once  mirtazapine 7.5 milliGRAM(s) Oral at bedtime  sodium chloride 0.9%. 1000 milliLiter(s) (100 mL/Hr) IV Continuous <Continuous>      PHYSICAL EXAM:  T(C): 36.3 (10-07-18 @ 11:44), Max: 36.9 (10-06-18 @ 20:05)  HR: 59 (10-07-18 @ 11:44) (53 - 63)  BP: 149/77 (10-07-18 @ 11:44) (114/71 - 149/77)  RR: 18 (10-07-18 @ 11:44) (18 - 18)  SpO2: 99% (10-07-18 @ 11:44) (97% - 99%)  Wt(kg): --  I&O's Summary    06 Oct 2018 07:01  -  07 Oct 2018 07:00  --------------------------------------------------------  IN: 3000 mL / OUT: 0 mL / NET: 3000 mL    07 Oct 2018 07:01  -  07 Oct 2018 18:34  --------------------------------------------------------  IN: 1710 mL / OUT: 0 mL / NET: 1710 mL          Appearance: Normal anxious complaining  of nausea and dizziness	  HEENT:   Normal oral mucosa, PERRL, EOMI	  Cardiovascular: Normal S1 S2 no rub no tachycard No JVD, No murmurs ,  Respiratory: Lungs clear to auscultation, normal effort 	  Gastrointestinal:  Soft, Non-tender, + BS	  Skin: No rashes, No ecchymoses, No cyanosis, warm to touch  Musculoskeletal: Normal range of motion, normal strength  Psychiatry:  Mood & affect appropriate  Ext: No edema left flank tenderness  Peripheral pulses palpable 2+ bilaterally       LABS:    CARDIAC MARKERS:                                12.0   6.60  )-----------( 383      ( 07 Oct 2018 10:12 )             37.2     10-07    144  |  107  |  11  ----------------------------<  84  4.9   |  28  |  0.87    Ca    9.2      07 Oct 2018 07:03  Mg     2.0     10-06      proBNP:   Lipid Profile:   HgA1c:   TSH:           TELEMETRY: 	    ECG:  sinus bradycardia	  RADIOLOGY:   < from: Limited Transthoracic Echo (10.07.18 @ 08:55) >  CONCLUSIONS:  Limited repeat study to re-evaluate pericardial effusion.  Large localized pericardial effusion, measuring about  2.1  cm adjacent to the RV free wall (best seen in the subcostal  view).  The right ventricle appears compressed.  This may  reflect the presence of elevated pericardial pressures  and/or early tamponade physiology.  *** Compared with echocardiogram of 10/5/2018, no  significant changes noted.  ------------------------------------------------------------------------  Confirmed on  10/7/2018 - 14:31:06 by González Billings M.D.  ------------------------------------------------------------------------    < end of copied text >    DIAGNOSTIC TESTING:  [ ] Echocardiogram:  [ ]  Catheterization:  [ ] Stress Test:    OTHER:

## 2018-10-07 NOTE — PROGRESS NOTE ADULT - SUBJECTIVE AND OBJECTIVE BOX
BAYLEE PRUITT 63y [id number]    Patient is a 63y old  Female who presents with a chief complaint of diffuse aches and pains hypotension (06 Oct 2018 12:40)      Follow Up/CC:  ID following for UTI    Interval History/ROS: no fever, I want to sleep    Allergies    e-mycin,tetracycline,other antibiotic (Rash)  erythromycin (Rash)    Intolerances        ANTIMICROBIALS:  cefTRIAXone   IVPB 1 every 12 hours      MEDICATIONS  (STANDING):  cefTRIAXone   IVPB 1 Gram(s) IV Intermittent every 12 hours  colchicine 0.6 milliGRAM(s) Oral two times a day  escitalopram 30 milliGRAM(s) Oral daily  heparin  Injectable 5000 Unit(s) SubCutaneous every 12 hours  ibuprofen  Tablet. 600 milliGRAM(s) Oral three times a day  influenza   Vaccine 0.5 milliLiter(s) IntraMuscular once  mirtazapine 7.5 milliGRAM(s) Oral at bedtime  sodium chloride 0.9%. 1000 milliLiter(s) (100 mL/Hr) IV Continuous <Continuous>    MEDICATIONS  (PRN):  meclizine 12.5 milliGRAM(s) Oral every 8 hours PRN Dizziness        Vital Signs Last 24 Hrs  T(C): 36.5 (07 Oct 2018 01:50), Max: 36.9 (06 Oct 2018 20:05)  T(F): 97.7 (07 Oct 2018 01:50), Max: 98.5 (06 Oct 2018 20:05)  HR: 53 (07 Oct 2018 01:50) (53 - 65)  BP: 114/71 (07 Oct 2018 01:50) (114/71 - 136/75)  BP(mean): --  RR: 18 (07 Oct 2018 01:50) (18 - 18)  SpO2: 99% (07 Oct 2018 01:50) (97% - 99%)    CBC Full  -  ( 06 Oct 2018 07:50 )  WBC Count : 5.37 K/uL  Hemoglobin : 11.5 g/dL  Hematocrit : 36.2 %  Platelet Count - Automated : 400 K/uL  Mean Cell Volume : 89.6 fl  Mean Cell Hemoglobin : 28.5 pg  Mean Cell Hemoglobin Concentration : 31.8 gm/dL  Auto Neutrophil # : x  Auto Lymphocyte # : x  Auto Monocyte # : x  Auto Eosinophil # : x  Auto Basophil # : x  Auto Neutrophil % : x  Auto Lymphocyte % : x  Auto Monocyte % : x  Auto Eosinophil % : x  Auto Basophil % : x    10-07    144  |  107  |  11  ----------------------------<  84  4.9   |  28  |  0.87    Ca    9.2      07 Oct 2018 07:03  Mg     2.0     10-06            MICROBIOLOGY:  .Urine Clean Catch (Midstream)  10-03-18   >100,000 CFU/ml Escherichia coli  --  Escherichia coli      .Blood Blood  10-03-18   No growth to date.  --  --    Rapid RVP Result: NotDetec (10-03 @ 18:09)      RADIOLOGY    CT Abdomen and Pelvis w/ Oral Cont and w/ IV Cont (10.03.18 @ 18:20) >  Small pericardial effusion, slightly increased since   10/13/2017.    Heterogeneous enhancement of the kidneys bilaterally. In addition, there   is mild enhancement of the urothelium in the proximal right ureter.The   differential diagnosis includes infection such as pyelonephritis,   ischemia, and infiltrative processes such as lymphoma and leukemia.

## 2018-10-07 NOTE — PROGRESS NOTE ADULT - SUBJECTIVE AND OBJECTIVE BOX
CHIEF COMPLAINT:Patient is a 63y old  Female who presents with a chief complaint of UTI (07 Oct 2018 08:00)    	        PAST MEDICAL & SURGICAL HISTORY:  Anxiety  No significant past surgical history          REVIEW OF SYSTEMS:  CONSTITUTIONAL: No fever, weight loss, or fatigue  EYES: No eye pain, visual disturbances, or discharge  NECK: No pain or stiffness  RESPIRATORY: No cough, wheezing, chills or hemoptysis; No Shortness of Breath  CARDIOVASCULAR: No chest pain, palpitations,  or leg swelling  GASTROINTESTINAL: No abdominal or epigastric pain. No nausea, vomiting, or hematemesis; No diarrhea or constipation. No melena or hematochezia.  GENITOURINARY: No dysuria, frequency, hematuria, or incontinence  NEUROLOGICAL: No headaches, dizzy  MUSCULOSKELETAL: No joint pain or swelling; No muscle, back, or extremity pain    Medications:  MEDICATIONS  (STANDING):  cefTRIAXone   IVPB 1 Gram(s) IV Intermittent every 12 hours  colchicine 0.6 milliGRAM(s) Oral two times a day  escitalopram 30 milliGRAM(s) Oral daily  heparin  Injectable 5000 Unit(s) SubCutaneous every 12 hours  ibuprofen  Tablet. 600 milliGRAM(s) Oral three times a day  influenza   Vaccine 0.5 milliLiter(s) IntraMuscular once  mirtazapine 7.5 milliGRAM(s) Oral at bedtime  sodium chloride 0.9%. 1000 milliLiter(s) (100 mL/Hr) IV Continuous <Continuous>    MEDICATIONS  (PRN):  meclizine 12.5 milliGRAM(s) Oral every 8 hours PRN Dizziness    	    PHYSICAL EXAM:  T(C): 36.5 (10-07-18 @ 01:50), Max: 36.9 (10-06-18 @ 20:05)  HR: 53 (10-07-18 @ 01:50) (53 - 65)  BP: 114/71 (10-07-18 @ 01:50) (114/71 - 136/75)  RR: 18 (10-07-18 @ 01:50) (18 - 18)  SpO2: 99% (10-07-18 @ 01:50) (97% - 99%)  Wt(kg): --  I&O's Summary    06 Oct 2018 07:01  -  07 Oct 2018 07:00  --------------------------------------------------------  IN: 3000 mL / OUT: 0 mL / NET: 3000 mL        Appearance: Normal	  HEENT:   Normal oral mucosa, PERRL, EOMI	  Lymphatic: No lymphadenopathy  Cardiovascular: Normal S1 S2, No JVD, No murmurs, No edema  Respiratory: Lungs clear to auscultation	  Psychiatry: A & O   Gastrointestinal:  Soft, Non-tender, + BS	  Skin: No rashes, No ecchymoses, No cyanosis	  Neurologic: Non-focal motor equal b/l  sensory intact    Extremities: Normal range of motion, No clubbing, cyanosis or edema  Vascular: Peripheral pulses palpable 2+ bilaterally    TELEMETRY: 	    ECG:  	  RADIOLOGY:  OTHER: 	  	  LABS:	 	    CARDIAC MARKERS:                                11.5   5.37  )-----------( 400      ( 06 Oct 2018 07:50 )             36.2     10-07    144  |  107  |  11  ----------------------------<  84  4.9   |  28  |  0.87    Ca    9.2      07 Oct 2018 07:03  Mg     2.0     10-06      proBNP:   Lipid Profile:   HgA1c:   TSH:

## 2018-10-08 ENCOUNTER — APPOINTMENT (OUTPATIENT)
Dept: PULMONOLOGY | Facility: CLINIC | Age: 63
End: 2018-10-08

## 2018-10-08 LAB
ANION GAP SERPL CALC-SCNC: 9 MMOL/L — SIGNIFICANT CHANGE UP (ref 5–17)
BUN SERPL-MCNC: 15 MG/DL — SIGNIFICANT CHANGE UP (ref 7–23)
CALCIUM SERPL-MCNC: 9 MG/DL — SIGNIFICANT CHANGE UP (ref 8.4–10.5)
CHLORIDE SERPL-SCNC: 107 MMOL/L — SIGNIFICANT CHANGE UP (ref 96–108)
CO2 SERPL-SCNC: 28 MMOL/L — SIGNIFICANT CHANGE UP (ref 22–31)
CREAT SERPL-MCNC: 0.88 MG/DL — SIGNIFICANT CHANGE UP (ref 0.5–1.3)
CULTURE RESULTS: SIGNIFICANT CHANGE UP
CULTURE RESULTS: SIGNIFICANT CHANGE UP
GLUCOSE SERPL-MCNC: 90 MG/DL — SIGNIFICANT CHANGE UP (ref 70–99)
HCT VFR BLD CALC: 38.7 % — SIGNIFICANT CHANGE UP (ref 34.5–45)
HGB BLD-MCNC: 12.4 G/DL — SIGNIFICANT CHANGE UP (ref 11.5–15.5)
MCHC RBC-ENTMCNC: 28.8 PG — SIGNIFICANT CHANGE UP (ref 27–34)
MCHC RBC-ENTMCNC: 32 GM/DL — SIGNIFICANT CHANGE UP (ref 32–36)
MCV RBC AUTO: 90 FL — SIGNIFICANT CHANGE UP (ref 80–100)
PLATELET # BLD AUTO: 426 K/UL — HIGH (ref 150–400)
POTASSIUM SERPL-MCNC: 4.2 MMOL/L — SIGNIFICANT CHANGE UP (ref 3.5–5.3)
POTASSIUM SERPL-SCNC: 4.2 MMOL/L — SIGNIFICANT CHANGE UP (ref 3.5–5.3)
RBC # BLD: 4.3 M/UL — SIGNIFICANT CHANGE UP (ref 3.8–5.2)
RBC # FLD: 14.2 % — SIGNIFICANT CHANGE UP (ref 10.3–14.5)
SODIUM SERPL-SCNC: 144 MMOL/L — SIGNIFICANT CHANGE UP (ref 135–145)
SPECIMEN SOURCE: SIGNIFICANT CHANGE UP
SPECIMEN SOURCE: SIGNIFICANT CHANGE UP
WBC # BLD: 7.81 K/UL — SIGNIFICANT CHANGE UP (ref 3.8–10.5)
WBC # FLD AUTO: 7.81 K/UL — SIGNIFICANT CHANGE UP (ref 3.8–10.5)

## 2018-10-08 PROCEDURE — 99232 SBSQ HOSP IP/OBS MODERATE 35: CPT

## 2018-10-08 RX ORDER — ONDANSETRON 8 MG/1
4 TABLET, FILM COATED ORAL EVERY 6 HOURS
Qty: 0 | Refills: 0 | Status: DISCONTINUED | OUTPATIENT
Start: 2018-10-08 | End: 2018-10-19

## 2018-10-08 RX ADMIN — Medication 600 MILLIGRAM(S): at 07:05

## 2018-10-08 RX ADMIN — HEPARIN SODIUM 5000 UNIT(S): 5000 INJECTION INTRAVENOUS; SUBCUTANEOUS at 06:05

## 2018-10-08 RX ADMIN — MIRTAZAPINE 7.5 MILLIGRAM(S): 45 TABLET, ORALLY DISINTEGRATING ORAL at 22:20

## 2018-10-08 RX ADMIN — ESCITALOPRAM OXALATE 30 MILLIGRAM(S): 10 TABLET, FILM COATED ORAL at 13:48

## 2018-10-08 RX ADMIN — Medication 600 MILLIGRAM(S): at 22:21

## 2018-10-08 RX ADMIN — ONDANSETRON 4 MILLIGRAM(S): 8 TABLET, FILM COATED ORAL at 14:14

## 2018-10-08 RX ADMIN — CEFTRIAXONE 100 GRAM(S): 500 INJECTION, POWDER, FOR SOLUTION INTRAMUSCULAR; INTRAVENOUS at 17:37

## 2018-10-08 RX ADMIN — HEPARIN SODIUM 5000 UNIT(S): 5000 INJECTION INTRAVENOUS; SUBCUTANEOUS at 17:37

## 2018-10-08 RX ADMIN — Medication 600 MILLIGRAM(S): at 13:48

## 2018-10-08 RX ADMIN — Medication 0.6 MILLIGRAM(S): at 06:05

## 2018-10-08 RX ADMIN — Medication 600 MILLIGRAM(S): at 00:06

## 2018-10-08 RX ADMIN — Medication 600 MILLIGRAM(S): at 22:20

## 2018-10-08 RX ADMIN — Medication 12.5 MILLIGRAM(S): at 14:14

## 2018-10-08 RX ADMIN — Medication 600 MILLIGRAM(S): at 06:05

## 2018-10-08 RX ADMIN — CEFTRIAXONE 100 GRAM(S): 500 INJECTION, POWDER, FOR SOLUTION INTRAMUSCULAR; INTRAVENOUS at 06:05

## 2018-10-08 RX ADMIN — SODIUM CHLORIDE 100 MILLILITER(S): 9 INJECTION INTRAMUSCULAR; INTRAVENOUS; SUBCUTANEOUS at 06:05

## 2018-10-08 RX ADMIN — Medication 0.6 MILLIGRAM(S): at 17:37

## 2018-10-08 NOTE — PROGRESS NOTE ADULT - RS GEN PE MLT RESP DETAILS PC
respirations non-labored/clear to auscultation bilaterally
clear to auscultation bilaterally/respirations non-labored

## 2018-10-08 NOTE — PROGRESS NOTE ADULT - NEGATIVE GASTROINTESTINAL SYMPTOMS
no vomiting/no abdominal pain/no nausea/no diarrhea
no nausea/no diarrhea/no abdominal pain/no vomiting

## 2018-10-08 NOTE — PROGRESS NOTE ADULT - ATTENDING COMMENTS
Chilango Sebastian  Attending Physician   Division of Infectious Disease  Pager #[phone]  After 5pm/weekend or no response, call #[phone]
Chilango Sebastian  Attending Physician   Division of Infectious Disease  Pager #[phone]  After 5pm/weekend or no response, call #[phone]

## 2018-10-08 NOTE — PROGRESS NOTE ADULT - SUBJECTIVE AND OBJECTIVE BOX
BAYLEE PRUITT 63y [id number]    Patient is a 63y old  Female who presents with a chief complaint of left flank pain diffuse aches and pains (07 Oct 2018 18:33)      Follow Up/CC:  ID following for UTI    Interval History/ROS:    Allergies    e-mycin,tetracycline,other antibiotic (Rash)  erythromycin (Rash)    Intolerances        ANTIMICROBIALS:  cefTRIAXone   IVPB 1 every 12 hours      MEDICATIONS  (STANDING):  cefTRIAXone   IVPB 1 Gram(s) IV Intermittent every 12 hours  colchicine 0.6 milliGRAM(s) Oral two times a day  escitalopram 30 milliGRAM(s) Oral daily  heparin  Injectable 5000 Unit(s) SubCutaneous every 12 hours  ibuprofen  Tablet. 600 milliGRAM(s) Oral three times a day  influenza   Vaccine 0.5 milliLiter(s) IntraMuscular once  mirtazapine 7.5 milliGRAM(s) Oral at bedtime  sodium chloride 0.9%. 1000 milliLiter(s) (100 mL/Hr) IV Continuous <Continuous>    MEDICATIONS  (PRN):  meclizine 12.5 milliGRAM(s) Oral every 8 hours PRN Dizziness  ondansetron Injectable 4 milliGRAM(s) IV Push every 6 hours PRN Nausea        Vital Signs Last 24 Hrs  T(C): 36.5 (08 Oct 2018 14:10), Max: 36.7 (07 Oct 2018 20:50)  T(F): 97.7 (08 Oct 2018 14:10), Max: 98 (07 Oct 2018 20:50)  HR: 69 (08 Oct 2018 14:10) (52 - 69)  BP: 111/61 (08 Oct 2018 14:10) (111/61 - 149/70)  BP(mean): --  RR: 18 (08 Oct 2018 14:10) (18 - 18)  SpO2: 98% (08 Oct 2018 14:10) (98% - 99%)    CBC Full  -  ( 08 Oct 2018 08:06 )  WBC Count : 7.81 K/uL  Hemoglobin : 12.4 g/dL  Hematocrit : 38.7 %  Platelet Count - Automated : 426 K/uL  Mean Cell Volume : 90.0 fl  Mean Cell Hemoglobin : 28.8 pg  Mean Cell Hemoglobin Concentration : 32.0 gm/dL  Auto Neutrophil # : x  Auto Lymphocyte # : x  Auto Monocyte # : x  Auto Eosinophil # : x  Auto Basophil # : x  Auto Neutrophil % : x  Auto Lymphocyte % : x  Auto Monocyte % : x  Auto Eosinophil % : x  Auto Basophil % : x    10-08    144  |  107  |  15  ----------------------------<  90  4.2   |  28  |  0.88    Ca    9.0      08 Oct 2018 06:39            MICROBIOLOGY:  .Urine Clean Catch (Midstream)  10-03-18   >100,000 CFU/ml Escherichia coli  --  Escherichia coli      .Blood Blood  10-03-18   No growth to date.  --  --      Rapid RVP Result: NotDetec (10-03 @ 18:09)    RADIOLOGY    < from: CT Abdomen and Pelvis w/ Oral Cont and w/ IV Cont (10.03.18 @ 18:20) >  Small pericardial effusion, slightly increased since   10/13/2017.    Heterogeneous enhancement of the kidneys bilaterally. In addition, there   is mild enhancement of the urothelium in the proximal right ureter.The   differential diagnosis includes infection such as pyelonephritis,   ischemia, and infiltrative processes such as lymphoma and leukemia BAYLEE PRUITT 63y [id number]    Patient is a 63y old  Female who presents with a chief complaint of left flank pain diffuse aches and pains (07 Oct 2018 18:33)      Follow Up/CC:  ID following for UTI    Interval History/ROS: c/o dizziness    Allergies    e-mycin,tetracycline,other antibiotic (Rash)  erythromycin (Rash)    Intolerances        ANTIMICROBIALS:  cefTRIAXone   IVPB 1 every 12 hours      MEDICATIONS  (STANDING):  cefTRIAXone   IVPB 1 Gram(s) IV Intermittent every 12 hours  colchicine 0.6 milliGRAM(s) Oral two times a day  escitalopram 30 milliGRAM(s) Oral daily  heparin  Injectable 5000 Unit(s) SubCutaneous every 12 hours  ibuprofen  Tablet. 600 milliGRAM(s) Oral three times a day  influenza   Vaccine 0.5 milliLiter(s) IntraMuscular once  mirtazapine 7.5 milliGRAM(s) Oral at bedtime  sodium chloride 0.9%. 1000 milliLiter(s) (100 mL/Hr) IV Continuous <Continuous>    MEDICATIONS  (PRN):  meclizine 12.5 milliGRAM(s) Oral every 8 hours PRN Dizziness  ondansetron Injectable 4 milliGRAM(s) IV Push every 6 hours PRN Nausea        Vital Signs Last 24 Hrs  T(C): 36.5 (08 Oct 2018 14:10), Max: 36.7 (07 Oct 2018 20:50)  T(F): 97.7 (08 Oct 2018 14:10), Max: 98 (07 Oct 2018 20:50)  HR: 69 (08 Oct 2018 14:10) (52 - 69)  BP: 111/61 (08 Oct 2018 14:10) (111/61 - 149/70)  BP(mean): --  RR: 18 (08 Oct 2018 14:10) (18 - 18)  SpO2: 98% (08 Oct 2018 14:10) (98% - 99%)    CBC Full  -  ( 08 Oct 2018 08:06 )  WBC Count : 7.81 K/uL  Hemoglobin : 12.4 g/dL  Hematocrit : 38.7 %  Platelet Count - Automated : 426 K/uL  Mean Cell Volume : 90.0 fl  Mean Cell Hemoglobin : 28.8 pg  Mean Cell Hemoglobin Concentration : 32.0 gm/dL  Auto Neutrophil # : x  Auto Lymphocyte # : x  Auto Monocyte # : x  Auto Eosinophil # : x  Auto Basophil # : x  Auto Neutrophil % : x  Auto Lymphocyte % : x  Auto Monocyte % : x  Auto Eosinophil % : x  Auto Basophil % : x    10-08    144  |  107  |  15  ----------------------------<  90  4.2   |  28  |  0.88    Ca    9.0      08 Oct 2018 06:39            MICROBIOLOGY:  .Urine Clean Catch (Midstream)  10-03-18   >100,000 CFU/ml Escherichia coli  --  Escherichia coli      .Blood Blood  10-03-18   No growth to date.  --  --      Rapid RVP Result: NotDetec (10-03 @ 18:09)    RADIOLOGY    < from: CT Abdomen and Pelvis w/ Oral Cont and w/ IV Cont (10.03.18 @ 18:20) >  Small pericardial effusion, slightly increased since   10/13/2017.    Heterogeneous enhancement of the kidneys bilaterally. In addition, there   is mild enhancement of the urothelium in the proximal right ureter.The   differential diagnosis includes infection such as pyelonephritis,   ischemia, and infiltrative processes such as lymphoma and leukemia

## 2018-10-08 NOTE — PROGRESS NOTE ADULT - GASTROINTESTINAL DETAILS
no rigidity/nontender/no distention/no guarding/no rebound tenderness/soft
soft/nontender/no distention/no guarding/no rebound tenderness/no rigidity

## 2018-10-08 NOTE — CONSULT NOTE ADULT - SUBJECTIVE AND OBJECTIVE BOX
Consult requested by Dr Berkowitz.  See dictated note.  Imp: laft pinkyfx with malet deformity.  Rec: Splint at all times.         Follow up next wk as out-pt.

## 2018-10-08 NOTE — PROGRESS NOTE BEHAVIORAL HEALTH - NSBHCHARTREVIEWIMAGING_PSY_A_CORE FT
10/7 Echo:  CONCLUSIONS:  Limited repeat study to re-evaluate pericardial effusion.  Large localized pericardial effusion, measuring about  2.1  cm adjacent to the RV free wall (best seen in the subcostal  view).  The right ventricle appears compressed.  This may  reflect the presence of elevated pericardial pressures  and/or early tamponade physiology.  *** Compared with echocardiogram of 10/5/2018, no  significant changes noted.

## 2018-10-08 NOTE — PROGRESS NOTE BEHAVIORAL HEALTH - NSBHFUPSUICINTERVALFT_PSY_A_CORE
no active SI, no active intent or plan. talks about sleeping and not waking up, but is engaged in treatment

## 2018-10-09 DIAGNOSIS — R42 DIZZINESS AND GIDDINESS: ICD-10-CM

## 2018-10-09 LAB
ANION GAP SERPL CALC-SCNC: 13 MMOL/L — SIGNIFICANT CHANGE UP (ref 5–17)
BUN SERPL-MCNC: 16 MG/DL — SIGNIFICANT CHANGE UP (ref 7–23)
CALCIUM SERPL-MCNC: 9.3 MG/DL — SIGNIFICANT CHANGE UP (ref 8.4–10.5)
CCP IGG SERPL-ACNC: <8 UNITS — SIGNIFICANT CHANGE UP (ref 0–19)
CHLORIDE SERPL-SCNC: 105 MMOL/L — SIGNIFICANT CHANGE UP (ref 96–108)
CO2 SERPL-SCNC: 25 MMOL/L — SIGNIFICANT CHANGE UP (ref 22–31)
CREAT SERPL-MCNC: 0.88 MG/DL — SIGNIFICANT CHANGE UP (ref 0.5–1.3)
DSDNA AB SER-ACNC: <12 IU/ML — SIGNIFICANT CHANGE UP
GLUCOSE SERPL-MCNC: 83 MG/DL — SIGNIFICANT CHANGE UP (ref 70–99)
HCT VFR BLD CALC: 40 % — SIGNIFICANT CHANGE UP (ref 34.5–45)
HGB BLD-MCNC: 13.1 G/DL — SIGNIFICANT CHANGE UP (ref 11.5–15.5)
MCHC RBC-ENTMCNC: 29 PG — SIGNIFICANT CHANGE UP (ref 27–34)
MCHC RBC-ENTMCNC: 32.8 GM/DL — SIGNIFICANT CHANGE UP (ref 32–36)
MCV RBC AUTO: 88.7 FL — SIGNIFICANT CHANGE UP (ref 80–100)
PLATELET # BLD AUTO: 429 K/UL — HIGH (ref 150–400)
POTASSIUM SERPL-MCNC: 4.1 MMOL/L — SIGNIFICANT CHANGE UP (ref 3.5–5.3)
POTASSIUM SERPL-SCNC: 4.1 MMOL/L — SIGNIFICANT CHANGE UP (ref 3.5–5.3)
RBC # BLD: 4.51 M/UL — SIGNIFICANT CHANGE UP (ref 3.8–5.2)
RBC # FLD: 14.2 % — SIGNIFICANT CHANGE UP (ref 10.3–14.5)
RF+CCP IGG SER-IMP: NEGATIVE — SIGNIFICANT CHANGE UP
SODIUM SERPL-SCNC: 143 MMOL/L — SIGNIFICANT CHANGE UP (ref 135–145)
WBC # BLD: 7.92 K/UL — SIGNIFICANT CHANGE UP (ref 3.8–10.5)
WBC # FLD AUTO: 7.92 K/UL — SIGNIFICANT CHANGE UP (ref 3.8–10.5)

## 2018-10-09 PROCEDURE — 99254 IP/OBS CNSLTJ NEW/EST MOD 60: CPT

## 2018-10-09 PROCEDURE — 99232 SBSQ HOSP IP/OBS MODERATE 35: CPT

## 2018-10-09 RX ORDER — CEFUROXIME AXETIL 250 MG
500 TABLET ORAL EVERY 12 HOURS
Qty: 0 | Refills: 0 | Status: COMPLETED | OUTPATIENT
Start: 2018-10-09 | End: 2018-10-10

## 2018-10-09 RX ORDER — MIRTAZAPINE 45 MG/1
15 TABLET, ORALLY DISINTEGRATING ORAL AT BEDTIME
Qty: 0 | Refills: 0 | Status: DISCONTINUED | OUTPATIENT
Start: 2018-10-09 | End: 2018-10-19

## 2018-10-09 RX ADMIN — Medication 600 MILLIGRAM(S): at 06:36

## 2018-10-09 RX ADMIN — ESCITALOPRAM OXALATE 30 MILLIGRAM(S): 10 TABLET, FILM COATED ORAL at 12:46

## 2018-10-09 RX ADMIN — HEPARIN SODIUM 5000 UNIT(S): 5000 INJECTION INTRAVENOUS; SUBCUTANEOUS at 17:18

## 2018-10-09 RX ADMIN — Medication 500 MILLIGRAM(S): at 17:22

## 2018-10-09 RX ADMIN — Medication 0.6 MILLIGRAM(S): at 06:35

## 2018-10-09 RX ADMIN — Medication 600 MILLIGRAM(S): at 15:57

## 2018-10-09 RX ADMIN — CEFTRIAXONE 100 GRAM(S): 500 INJECTION, POWDER, FOR SOLUTION INTRAMUSCULAR; INTRAVENOUS at 06:36

## 2018-10-09 RX ADMIN — Medication 600 MILLIGRAM(S): at 23:01

## 2018-10-09 RX ADMIN — Medication 12.5 MILLIGRAM(S): at 17:20

## 2018-10-09 RX ADMIN — Medication 600 MILLIGRAM(S): at 06:35

## 2018-10-09 RX ADMIN — ONDANSETRON 4 MILLIGRAM(S): 8 TABLET, FILM COATED ORAL at 18:28

## 2018-10-09 RX ADMIN — Medication 600 MILLIGRAM(S): at 14:57

## 2018-10-09 RX ADMIN — HEPARIN SODIUM 5000 UNIT(S): 5000 INJECTION INTRAVENOUS; SUBCUTANEOUS at 06:36

## 2018-10-09 RX ADMIN — MIRTAZAPINE 15 MILLIGRAM(S): 45 TABLET, ORALLY DISINTEGRATING ORAL at 23:01

## 2018-10-09 RX ADMIN — Medication 12.5 MILLIGRAM(S): at 06:38

## 2018-10-09 RX ADMIN — Medication 0.6 MILLIGRAM(S): at 17:17

## 2018-10-09 NOTE — CONSULT NOTE ADULT - ATTENDING COMMENTS
history not consistent with peripheral vertigo.  Recommend MRI to r/o central vertigo. history not consistent with peripheral vertigo, BPPV only lasts for 30sec - 1 mins, this patient's vertigo is constant and has been present for > 1 week.    Recommend MRI to r/o central vertigo.

## 2018-10-09 NOTE — PHYSICAL THERAPY INITIAL EVALUATION ADULT - GENERAL OBSERVATIONS, REHAB EVAL
Upon PT arrival to room, pt getting OOB with PCA to bathroom. Pt completed supine to sit ind, amb with hand held assist/cg 2* dizziness. PT assisted pt from bathroom to bedside.

## 2018-10-09 NOTE — CONSULT NOTE ADULT - SUBJECTIVE AND OBJECTIVE BOX
CC: Dizziness    HPI: 63 year old woman with pmhx depression, anxiety, PTSD admitted with 1 week of body aches, sweats, chills, nausea, mild loose stools, dysuria, went to see PMD and found to be hypotensive referred to ED, +UA, started on fluids and antibiotics for suspected UTI/pyleo. Pt reports was ambulating from bathroom, had stood washed hands and then felt acutely lightheaded, legs felt 'like jelly' and fell into the door, hit head, since then has had vertiginous dizziness, positional. We are asked to evaluate the patient for vertigo  Pt reports a few bouts of mild vertigo ( room spinning) which last for about 30 seconds and resolve, this week pt states her vertigo has been constant and has had minimal relief with Meclizine. pt has had similar symptoms in the past  when turns head. CT head reveals no intracranial pathology/ no acute changes. Denies any headaches, no diplopia, no facial or limb weakness, paresthesias or dysarthria. Pt reports 1 episode of tinnitus, which resolved same day, no hearing loss/otalgia/drainage from ears      PAST MEDICAL & SURGICAL HISTORY:  Anxiety  No significant past surgical history    Allergies    e-mycin,tetracycline,other antibiotic (Rash)  erythromycin (Rash)    Intolerances      MEDICATIONS  (STANDING):  cefuroxime   Tablet 500 milliGRAM(s) Oral every 12 hours  colchicine 0.6 milliGRAM(s) Oral two times a day  escitalopram 30 milliGRAM(s) Oral daily  heparin  Injectable 5000 Unit(s) SubCutaneous every 12 hours  ibuprofen  Tablet. 600 milliGRAM(s) Oral three times a day  influenza   Vaccine 0.5 milliLiter(s) IntraMuscular once  mirtazapine 15 milliGRAM(s) Oral at bedtime    MEDICATIONS  (PRN):  meclizine 12.5 milliGRAM(s) Oral every 8 hours PRN Dizziness  ondansetron Injectable 4 milliGRAM(s) IV Push every 6 hours PRN Nausea      Social History: denies tobacco/etoh/substance use    Family history: non contributory    ROS:   ENT: all negative except as noted in HPI   CV: denies palpitations  Pulm: denies SOB, cough, hemoptysis  GI: denies change in apetite, indigestion, n/v  : denies pertinent urinary symptoms, urgency  Neuro: denies numbness/tingling, loss of sensation  Psych: denies anxiety  MS: denies muscle weakness, instability  Heme: denies easy bruising or bleeding  Endo: denies heat/cold intolerance, excessive sweating  Vascular: denies LE edema    Vital Signs Last 24 Hrs  T(C): 36.5 (09 Oct 2018 13:43), Max: 36.6 (08 Oct 2018 20:16)  T(F): 97.7 (09 Oct 2018 13:43), Max: 97.9 (08 Oct 2018 20:16)  HR: 59 (09 Oct 2018 13:43) (54 - 59)  BP: 116/68 (09 Oct 2018 13:43) (116/68 - 138/85)  RR: 18 (09 Oct 2018 13:43) (18 - 18)  SpO2: 98% (09 Oct 2018 13:43) (98% - 98%)                          13.1   7.92  )-----------( 429      ( 09 Oct 2018 09:49 )             40.0    10-09    143  |  105  |  16  ----------------------------<  83  4.1   |  25  |  0.88    Ca    9.3      09 Oct 2018 07:48         PHYSICAL EXAM:  Gen: NAD  Skin: No rashes, bruises, or lesions  Head: Normocephalic, Atraumatic  Face: no edema, erythema, or fluctuance. Parotid glands soft without mass  Eyes: no scleral injection EOMI b/l PERRLA, no nystagmus  Ears: Right - ear canal clear, TM intact without effusion or erythema. No evidence of any fluid drainage. No mastoid tenderness, erythema, or ear bulging            Left - ear canal clear, TM intact without effusion or erythema. No evidence of any fluid drainage. No mastoid tenderness, erythema, or ear bulging  Nose: Nares bilaterally patent, no discharge  Mouth: No Stridor / Drooling / Trismus.  Mucosa moist, tongue/uvula midline, oropharynx clear  Neck: Flat, supple, no lymphadenopathy, trachea midline, no masses  Lymphatic: No lymphadenopathy  Resp: breathing easily, no stridor  Neuro: facial nerve intact, no facial droop  *Unable to perform Nora [place] as pt has cervical stenosis    EXAM: CT BRAIN   PROCEDURE DATE: 10/04/2018   INTERPRETATION: History: Syncope. Headaches. Hit head.     Description: A noncontrast head CT was performed. Axial images were   performed from the skull base to the vertex with coronal/sagittal   reconstructions.   Comparison is made to a brain MRI from 12/04/2017, head CT 10/19/2007.   There is no evidence for acute infarct, acute hemorrhage, mass effect,   calvarial fracture, or hydrocephalus.   Minimal patchy hypodensity without mass effect is noted in the   periventricular white matter which most likely represents chronic   microvascular ischemic changes given the patient's age.     Cerebral volume loss is present with secondary proportional prominence of   the sulci and ventricles.     No lytic or blastic calvarial lesions are noted. The visualized portions of   the paranasal sinuses and mastoid air cells are clear.     IMPRESSION:     No acute intracranial pathology is noted. If the patient has new and   persistent symptoms, consider short interval follow-up head CT or brain MRI   follow-up if there are no MRI contraindications.

## 2018-10-09 NOTE — CONSULT NOTE ADULT - ASSESSMENT
HPI: 63 year old woman with pmhx depression, anxiety, PTSD admitted with 1 week of body aches, sweats, chills, nausea, mild loose stools, dysuria, went to see PMD and found to be hypotensive referred to ED, +UA, started on fluids and antibiotics for suspected UTI/pyleo. Pt reports was ambulating from bathroom, had stood washed hands and then felt acutely lightheaded, legs felt 'like jelly' and fell into the door, hit head, since then has had vertiginous dizziness, positional. Has hx of a few bouts of mild vertigo in past in bed when turns head. CT head no acute changes. Denies any headaches, no diplopia, no facial or limb weakness, paresthesias or dysarthria. Has had some mild paresthesias in hands and feet this week. Exam nonfocal.    A/P: Presentation consistent with near syncope secondary to hypotension in setting of UTI/dehydration, Suspect vertigo BPV triggered by head impact with fall into door. CT head no acute changes. Doubt TIA/CVA.    Plan  1. Continue optimize hydration, IVF  2. Abx per ID, follow up Pancx  3. Meclizine prn for vertigo  4. Monitor closely  5. Supportive care  Will follow  Plan reviewed with pt and medicine NP.
1.small pericardial effusion on CT no evidence of tamponade not hemodynamic compromise  2. Flulike illness but left flank pain and abnormalities on CT scan suggesting infection/lymphoma  3. No acute cardiac issues  4. Rule out sepsis    Recommendations  2-D echo to evaluate LND RV function and pericardial effusion is baseline  Infectious dogy consult  Hydrate with IV hydration    yahaira Knight MD
62 yo with anxiety  Presebting to ER with subjective chills,abd pain and flank pain  ? Dysuria(vague historian)\  Pyuria on UA  CT with ? urothelial enhancement -? Pyelkonephritis  Stable    REc:  1) Check urine and blood Cx  2) Follow clinically  3) Empiric Ceftriaxone-no risk factors forMDR pathogens  4) Will tailor plan for ID issues  per course,results.Will defer to primary team on management of other issues.  Case d/w Med NP,Dr oliver  Will Follow.  Beeper [id number]/afterhours/No response-[id number]
63yF with weakness, syncope, and heterogenous enhancement of bilateral kidneys and enhance of proximal right ureter. Positive urine culture.
63yoF with Acute on Chronic vertigo, with no nystagmus on exam, neuro exam grossly intact. Unable to perform Nora Halpike Maneuver as pt with history of cervical stenosis, and limited ROM of neck
Imp:  62 yo female with a recent illness of sweats, weakness, malaise and found to have low BP.  NOted to have pericardial effusion-was present last year; now slightly larger.  No other signs of a systemic autoimmune process; some degree of OA, but no active synovitis, no rashes, etc.    Rec:  Check serology; TAYLOR, RF, dsDNA, ESR , CRP  Hold immunosuppression  Abx per ID  Cardiology following

## 2018-10-09 NOTE — PROGRESS NOTE ADULT - SUBJECTIVE AND OBJECTIVE BOX
NEUROLOGY:  Asked to reevaluate for persistent dizziness and episode of urinary incontinence  Pt reports meclizine helps with decreasing intensity but vertigo persists, worst with turning head to right    Medications:  cefuroxime   Tablet 500 milliGRAM(s) Oral every 12 hours  colchicine 0.6 milliGRAM(s) Oral two times a day  escitalopram 30 milliGRAM(s) Oral daily  heparin  Injectable 5000 Unit(s) SubCutaneous every 12 hours  ibuprofen  Tablet. 600 milliGRAM(s) Oral three times a day  influenza   Vaccine 0.5 milliLiter(s) IntraMuscular once  meclizine 12.5 milliGRAM(s) Oral every 8 hours PRN  mirtazapine 7.5 milliGRAM(s) Oral at bedtime  ondansetron Injectable 4 milliGRAM(s) IV Push every 6 hours PRN      Labs:  CBC Full  -  ( 09 Oct 2018 09:49 )  WBC Count : 7.92 K/uL  Hemoglobin : 13.1 g/dL  Hematocrit : 40.0 %  Platelet Count - Automated : 429 K/uL  Mean Cell Volume : 88.7 fl  Mean Cell Hemoglobin : 29.0 pg  Mean Cell Hemoglobin Concentration : 32.8 gm/dL  Auto Neutrophil # : x  Auto Lymphocyte # : x  Auto Monocyte # : x  Auto Eosinophil # : x  Auto Basophil # : x  Auto Neutrophil % : x  Auto Lymphocyte % : x  Auto Monocyte % : x  Auto Eosinophil % : x  Auto Basophil % : x    10-09    143  |  105  |  16  ----------------------------<  83  4.1   |  25  |  0.88    Ca    9.3      09 Oct 2018 07:48    Vitals:  Vital Signs Last 24 Hrs  T(C): 36.5 (09 Oct 2018 09:22), Max: 36.6 (08 Oct 2018 20:16)  T(F): 97.7 (09 Oct 2018 09:22), Max: 97.9 (08 Oct 2018 20:16)  HR: 56 (09 Oct 2018 09:22) (54 - 69)  BP: 138/85 (09 Oct 2018 09:22) (111/61 - 138/85)  BP(mean): --  RR: 18 (09 Oct 2018 09:22) (18 - 18)  SpO2: 98% (09 Oct 2018 09:22) (98% - 98%)    NEUROLOGICAL EXAM:    Mental status: Awake, alert, and in no apparent distress. Oriented x3. Language function is normal.    Cranial Nerves: Pupils were equal, round, reactive to light. Extraocular movements were intact but uncomfortable with eye movements, able to visual bilateral endgaze nystagmus. Fundoscopic exam was deferred. Facial sensation was intact to light touch. There was no facial asymmetry. The palate was upgoing symmetrically and tongue was midline.     Motor exam: Bulk and tone were normal. Strength was 5/5 in all four extremities. Fine finger movements were symmetric and normal. There was no pronator drift    Reflexes: 2+ in the bilateral upper extremities. 2+ in the bilateral lower extremities. Toes were downgoing bilaterally.     Sensation: Intact to light touch    Coordination: Finger-nose-finger without dysmetria.     Gait: deferred    NIHSS=0.    < from: CT Head No Cont (10.04.18 @ 10:34) >  EXAM:  CT BRAIN                            PROCEDURE DATE:  10/04/2018      INTERPRETATION:  History: Syncope. Headaches. Hit head.    Description: A noncontrast head CT was performed. Axial images were   performed from the skull base to the vertex with coronal/sagittal   reconstructions.    Comparison is made to a brain MRI from 12/04/2017, head CT 10/19/2007.    There is no evidence for acute infarct, acute hemorrhage, mass effect,   calvarial fracture, or hydrocephalus.    Minimal patchy hypodensity without mass effect is noted in the   periventricular white matter which most likely represents chronic   microvascular ischemic changes given the patient's age.    Cerebral volume loss is present with secondary proportional prominence of   the sulci and ventricles.    No lytic or blastic calvarial lesions are noted. The visualized portions   of the paranasal sinuses and mastoid air cells are clear.    IMPRESSION:    No acute intracranial pathology is noted. If the patient has new and   persistent symptoms, consider short interval follow-up head CT or brain   MRI follow-up if there are no MRI contraindications.    JADEN COOK M.D., ATTENDING RADIOLOGIST  This document has been electronically signed. Oct  4 2018 10:40AM

## 2018-10-09 NOTE — PROGRESS NOTE BEHAVIORAL HEALTH - CASE SUMMARY
63 year old female, single, retired, domiciled with PPH significant for depression, anxiety, PTSD and no significant PMH presents initially with dizziness. Found to have presumptive pyelonephritis.  Psych CL consulted to assist with anxiety, depressive symptoms. pt reports feeling depressive, active si, no clear intent, plan, some anxiety, c/o vertigo. rec inc remeron to 15mg po qhs
63 year old female, single, retired, domiciled with PPH significant for depression, anxiety, PTSD and no significant PMH presents initially with dizziness. Found to have presumptive pyelonephritis.  Psych CL consulted to assist with anxiety, depressive symptoms. pt reports feeling more depressed, anhedonic, hopeless, passive SI due to medical issues, severe dizziness. Pt frustrated with her medical care. rec inc remeron to 15mg po qhs

## 2018-10-09 NOTE — PHYSICAL THERAPY INITIAL EVALUATION ADULT - DISCHARGE DISPOSITION, PT EVAL
DC working towards home with outpt PT services (vestibular sx), reports family nearby to assist as available, CM to be notified, NP to be notified.

## 2018-10-09 NOTE — PHYSICAL THERAPY INITIAL EVALUATION ADULT - ACTIVE RANGE OF MOTION EXAMINATION, REHAB EVAL
Right LE Active ROM was WFL (within functional limits)/Right UE Active ROM was WFL (within functional limits)/Left LE Active ROM was WFL (within functional limits)/Left UE Active ROM was WFL (within functional limits)

## 2018-10-09 NOTE — PHYSICAL THERAPY INITIAL EVALUATION ADULT - PRECAUTIONS/LIMITATIONS, REHAB EVAL
xray L hand: Avulsion fracture at the fifth distal phalangeal base with swan-neck deformity of the fifth the IP. CTH: No acute intracranial pathology is noted. CT abd/pelvis: Small pericardial effusion, slightly increased since 10/13/2017. Heterogeneous enhancement of the kidneys bilaterally. In addition, there  is mild enhancement of the urothelium in the proximal right ureter. TTE large pericard effusion Hospital course:  pt stated fell onto left hand in ER 10/4 xray L hand: Avulsion fracture at the fifth distal phalangeal base with swan-neck deformity of the fifth the IP. CTH: No acute intracranial pathology is noted. CT abd/pelvis: Small pericardial effusion, slightly increased since 10/13/2017. Heterogeneous enhancement of the kidneys bilaterally. In addition, there  is mild enhancement of the urothelium in the proximal right ureter. TTE large pericard effusion Hospital course:  pt stated fell onto left hand in ER 10/4/fall precautions

## 2018-10-09 NOTE — PHYSICAL THERAPY INITIAL EVALUATION ADULT - ADDITIONAL COMMENTS
Patient lives alone in a private house, 4 steps to enter and is independent in ADLs and ambulation. Patient lives alone in a private house, 4 steps to enter and is independent in ADLs and ambulation. +driving, self-employed, Patient lives alone in a private house, 1 step to enter and is independent in ADLs and ambulation. +driving, self-employed,

## 2018-10-09 NOTE — PROGRESS NOTE ADULT - SUBJECTIVE AND OBJECTIVE BOX
CHIEF COMPLAINT:Patient is a 63y old  Female who presents with a chief complaint of UTI (09 Oct 2018 11:03)    	        PAST MEDICAL & SURGICAL HISTORY:  Anxiety  No significant past surgical history          REVIEW OF SYSTEMS:  CONSTITUTIONALweak  EYES: No eye pain, visual disturbances, or discharge  NECK: No pain or stiffness  RESPIRATORY: No cough, wheezing, chills or hemoptysis; No Shortness of Breath  CARDIOVASCULAR: No chest pain, palpitations, passing out,   dizzy when head moving   GASTROINTESTINAL: No abdominal or epigastric pain. No nausea, vomiting, or hematemesis; No diarrhea or constipation. No melena or hematochezia.  GENITOURINARY: No dysuria, frequency, hematuria, >? incontinence   NEUROLOGICAL: No headaches, memory loss, loss of strength, numbness, or tremors  SKIN: No itching, burning, rashes, or lesions   LYMPH Nodes: No enlarged glands  MUSCULOSKELETAL: No joint pain or swelling; No muscle, back, or extremity pain    Medications:  MEDICATIONS  (STANDING):  cefuroxime   Tablet 500 milliGRAM(s) Oral every 12 hours  colchicine 0.6 milliGRAM(s) Oral two times a day  escitalopram 30 milliGRAM(s) Oral daily  heparin  Injectable 5000 Unit(s) SubCutaneous every 12 hours  ibuprofen  Tablet. 600 milliGRAM(s) Oral three times a day  influenza   Vaccine 0.5 milliLiter(s) IntraMuscular once  mirtazapine 7.5 milliGRAM(s) Oral at bedtime    MEDICATIONS  (PRN):  meclizine 12.5 milliGRAM(s) Oral every 8 hours PRN Dizziness  ondansetron Injectable 4 milliGRAM(s) IV Push every 6 hours PRN Nausea    	    PHYSICAL EXAM:  T(C): 36.5 (10-09-18 @ 09:22), Max: 36.6 (10-08-18 @ 20:16)  HR: 56 (10-09-18 @ 09:22) (54 - 69)  BP: 138/85 (10-09-18 @ 09:22) (111/61 - 138/85)  RR: 18 (10-09-18 @ 09:22) (18 - 18)  SpO2: 98% (10-09-18 @ 09:22) (98% - 98%)  Wt(kg): --  I&O's Summary    08 Oct 2018 07:01  -  09 Oct 2018 07:00  --------------------------------------------------------  IN: 290 mL / OUT: 0 mL / NET: 290 mL    09 Oct 2018 07:01  -  09 Oct 2018 13:12  --------------------------------------------------------  IN: 120 mL / OUT: 0 mL / NET: 120 mL        Appearance: Normal	  HEENT:   Normal oral mucosa, PERRL, EOMI	  Lymphatic: No lymphadenopathy  Cardiovascular: Normal S1 S2, No JVD, No murmurs, No edema  Respiratory: Lungs clear to auscultation	  Psychiatry: A & O x 3, anxiety   Gastrointestinal:  Soft, Non-tender, + BS	  Skin: No rashes, No ecchymoses, No cyanosis	  Neurologic: Non-focal from motor equal bl sensory intact   Extremities: Normal range of motion, No clubbing, cyanosis or edema  Vascular: Peripheral pulses palpable 2+ bilaterally    TELEMETRY: 	    ECG:  	  RADIOLOGY:  OTHER: 	  	  LABS:	 	    CARDIAC MARKERS:                                13.1   7.92  )-----------( 429      ( 09 Oct 2018 09:49 )             40.0     10-09    143  |  105  |  16  ----------------------------<  83  4.1   |  25  |  0.88    Ca    9.3      09 Oct 2018 07:48      proBNP:   Lipid Profile:   HgA1c:   TSH:

## 2018-10-09 NOTE — PHYSICAL THERAPY INITIAL EVALUATION ADULT - PERTINENT HX OF CURRENT PROBLEM, REHAB EVAL
Pt is a 63y female admitted to [facility] on 10/3/18 for body aches, sweats, chills, nausea without vomiting. Diarrhea, no GI bleeding. +syncope, +hypotensive at PMD, sent to ED.

## 2018-10-09 NOTE — PROGRESS NOTE ADULT - SUBJECTIVE AND OBJECTIVE BOX
Subjective: Patient seen and examined. No new events except as noted.   patient continues to feel not well with dizziness and occasional nausea. She still has right flank pain.  She denies shortness of breath or palpitations.  MEDICATIONS:  MEDICATIONS  (STANDING):  cefuroxime   Tablet 500 milliGRAM(s) Oral every 12 hours  colchicine 0.6 milliGRAM(s) Oral two times a day  escitalopram 30 milliGRAM(s) Oral daily  heparin  Injectable 5000 Unit(s) SubCutaneous every 12 hours  ibuprofen  Tablet. 600 milliGRAM(s) Oral three times a day  influenza   Vaccine 0.5 milliLiter(s) IntraMuscular once  mirtazapine 15 milliGRAM(s) Oral at bedtime      PHYSICAL EXAM:  T(C): 36.9 (10-09-18 @ 20:33), Max: 36.9 (10-09-18 @ 20:33)  HR: 57 (10-09-18 @ 20:33) (56 - 59)  BP: 127/76 (10-09-18 @ 20:33) (116/68 - 138/85)  RR: 18 (10-09-18 @ 20:33) (18 - 18)  SpO2: 97% (10-09-18 @ 20:33) (97% - 98%)  Wt(kg): --  I&O's Summary    08 Oct 2018 07:01  -  09 Oct 2018 07:00  --------------------------------------------------------  IN: 290 mL / OUT: 0 mL / NET: 290 mL    09 Oct 2018 07:01  -  09 Oct 2018 22:15  --------------------------------------------------------  IN: 240 mL / OUT: 0 mL / NET: 240 mL          Appearance: Normal anxious, upset complaining  of dizziness	  HEENT:   Normal oral mucosa, PERRL, EOMI	  Cardiovascular: Normal S1 S2, No JVD, No murmurs ,no rub  Respiratory: Lungs clear to auscultation, normal effort 	  Gastrointestinal:  Soft, Non-tender, + BS	  Skin: No rashes, No ecchymoses, No cyanosis, warm to touch  Musculoskeletal: Normal range of motion, normal strength  Psychiatry:  Mood & affect appropriate  Ext: No edema        LABS:    CARDIAC MARKERS:                                13.1   7.92  )-----------( 429      ( 09 Oct 2018 09:49 )             40.0     10-09    143  |  105  |  16  ----------------------------<  83  4.1   |  25  |  0.88    Ca    9.3      09 Oct 2018 07:48      proBNP:   Lipid Profile:   HgA1c:   TSH:           TELEMETRY: 	    ECG:  	  RADIOLOGY:   DIAGNOSTIC TESTING:  [ ] Echocardiogram:  [ ]  Catheterization:  [ ] Stress Test:    OTHER:

## 2018-10-09 NOTE — PROGRESS NOTE ADULT - SUBJECTIVE AND OBJECTIVE BOX
Patient is a 63y old  Female who presents with a chief complaint of UTI (08 Oct 2018 15:13)    Being followed by ID for UTI    Interval history:still with weakness and diziness  denies urinary complaints  No acute events      ROS:  No cough,SOB,CP  No N/V/D./abd pain  No other complaints      Antimicrobials:    cefuroxime   Tablet 500 milliGRAM(s) Oral every 12 hours    Other medications reviewed    Vital Signs Last 24 Hrs  T(C): 36.5 (10-09-18 @ 09:22), Max: 36.6 (10-08-18 @ 20:16)  T(F): 97.7 (10-09-18 @ 09:22), Max: 97.9 (10-08-18 @ 20:16)  HR: 56 (10-09-18 @ 09:22) (54 - 69)  BP: 138/85 (10-09-18 @ 09:22) (111/61 - 138/85)  BP(mean): --  RR: 18 (10-09-18 @ 09:22) (18 - 18)  SpO2: 98% (10-09-18 @ 09:22) (98% - 98%)    Physical Exam:        HEENT PERRLA EOMI    No oral exudate or erythema    Chest Good AE,CTA    CVS RRR S1 S2 WNl No murmur or rub or gallop    Abd soft BS normal No tenderness no masses    IV site no erythema tenderness or discharge    CNS AAO X 3 no focal    Lab Data:                          13.1   7.92  )-----------( 429      ( 09 Oct 2018 09:49 )             40.0       10-09    143  |  105  |  16  ----------------------------<  83  4.1   |  25  |  0.88    Ca    9.3      09 Oct 2018 07:48          Culture - Urine (10.03.18 @ 22:13)    -  Amikacin: S <=8    -  Amoxicillin/Clavulanic Acid: S <=8/4    -  Ampicillin: S <=2 These ampicillin results predict results for amoxicillin    -  Ampicillin/Sulbactam: S <=4/2    -  Aztreonam: S <=4    -  Cefazolin: S <=2 For uncomplicated UTI with K. pneumoniae, E. coli, or P. mirablis: RUSSEL <=16 is sensitive and RUSSEL >=32 is resistant. This also predicts results for oral agents cefaclor, cefdinir, cefpodoxime, cefprozil, cefuroxime axetil, cephalexin and locarbef for uncomplicated UTI. Note that some isolates may be susceptible to these agents while testing resistant to cefazolin.    -  Cefepime: S <=2    -  Cefoxitin: S <=4    -  Ceftriaxone: S <=1 Enterobacter, Citrobacter, and Serratia may develop resistance during prolonged therapy    -  Ciprofloxacin: S <=0.5    -  Ertapenem: S <=0.5    -  Gentamicin: S <=1    -  Imipenem: S <=1    -  Levofloxacin: S <=1    -  Meropenem: S <=1    -  Nitrofurantoin: S <=32 Should not be used to treat pyelonephritis    -  Piperacillin/Tazobactam: S <=8    -  Tigecycline: S <=1    -  Tobramycin: S <=2    -  Trimethoprim/Sulfamethoxazole: R >2/38    Specimen Source: .Urine Clean Catch (Midstream)    Culture Results:   >100,000 CFU/ml Escherichia coli    Organism Identification: Escherichia coli    Organism: Escherichia coli    Method Type: RUSSEL

## 2018-10-10 LAB
ANA TITR SER: NEGATIVE — SIGNIFICANT CHANGE UP
ANA TITR SER: NEGATIVE — SIGNIFICANT CHANGE UP

## 2018-10-10 PROCEDURE — 99232 SBSQ HOSP IP/OBS MODERATE 35: CPT

## 2018-10-10 RX ORDER — ALPRAZOLAM 0.25 MG
0.5 TABLET ORAL ONCE
Qty: 0 | Refills: 0 | Status: DISCONTINUED | OUTPATIENT
Start: 2018-10-10 | End: 2018-10-12

## 2018-10-10 RX ADMIN — Medication 500 MILLIGRAM(S): at 05:37

## 2018-10-10 RX ADMIN — Medication 600 MILLIGRAM(S): at 05:37

## 2018-10-10 RX ADMIN — HEPARIN SODIUM 5000 UNIT(S): 5000 INJECTION INTRAVENOUS; SUBCUTANEOUS at 05:37

## 2018-10-10 RX ADMIN — Medication 600 MILLIGRAM(S): at 06:34

## 2018-10-10 RX ADMIN — Medication 0.6 MILLIGRAM(S): at 18:50

## 2018-10-10 RX ADMIN — Medication 12.5 MILLIGRAM(S): at 12:51

## 2018-10-10 RX ADMIN — HEPARIN SODIUM 5000 UNIT(S): 5000 INJECTION INTRAVENOUS; SUBCUTANEOUS at 18:50

## 2018-10-10 RX ADMIN — Medication 600 MILLIGRAM(S): at 00:24

## 2018-10-10 RX ADMIN — MIRTAZAPINE 15 MILLIGRAM(S): 45 TABLET, ORALLY DISINTEGRATING ORAL at 21:23

## 2018-10-10 RX ADMIN — ESCITALOPRAM OXALATE 30 MILLIGRAM(S): 10 TABLET, FILM COATED ORAL at 12:50

## 2018-10-10 RX ADMIN — Medication 0.6 MILLIGRAM(S): at 05:37

## 2018-10-10 NOTE — PROGRESS NOTE ADULT - SUBJECTIVE AND OBJECTIVE BOX
INTERVAL HPI/OVERNIGHT EVENTS:  Pt feels dizzy, no other major complaints    MEDICATIONS  (STANDING):  colchicine 0.6 milliGRAM(s) Oral two times a day  escitalopram 30 milliGRAM(s) Oral daily  heparin  Injectable 5000 Unit(s) SubCutaneous every 12 hours  influenza   Vaccine 0.5 milliLiter(s) IntraMuscular once  mirtazapine 15 milliGRAM(s) Oral at bedtime    MEDICATIONS  (PRN):  meclizine 12.5 milliGRAM(s) Oral every 8 hours PRN Dizziness  ondansetron Injectable 4 milliGRAM(s) IV Push every 6 hours PRN Nausea      Allergies    e-mycin,tetracycline,other antibiotic (Rash)  erythromycin (Rash)    Intolerances        REVIEW OF SYSTEMS    General:	  main issue is dizziness      Neurological:	  +Dizziness  Psychiatric:	    Hematology/Lymphatics:    Vital Signs Last 24 Hrs  T(C): 36.6 (10 Oct 2018 04:42), Max: 36.9 (09 Oct 2018 20:33)  T(F): 97.8 (10 Oct 2018 04:42), Max: 98.4 (09 Oct 2018 20:33)  HR: 51 (10 Oct 2018 04:42) (51 - 59)  BP: 139/67 (10 Oct 2018 04:42) (116/68 - 139/67)  BP(mean): --  RR: 18 (10 Oct 2018 04:42) (18 - 18)  SpO2: 98% (10 Oct 2018 04:42) (97% - 98%)      PHYSICAL EXAM:    Constitutional:  fair appearing        Cardiovascular:  s1s2 reg    Gastrointestinal:  abd soft nt    Extremities:  no edema      Neurological:    Skin:  no rash      Musculoskeletal:  no active synovitis seen    Psychiatric:  somewhat frustrated    LABS:                        13.1   7.92  )-----------( 429      ( 09 Oct 2018 09:49 )             40.0     10-09    143  |  105  |  16  ----------------------------<  83  4.1   |  25  |  0.88    Ca    9.3      09 Oct 2018 07:48      Anti-Nuclear Antibody in AM (10.09.18 @ 10:51)    Anti Nuclear Factor Titer: Negative    Double Stranded DNA Antibody (10.06.18 @ 08:45)    Double Stranded DNA Antibody: <12: Method: EIA            Reference Ranges            Interpretation            < 30      IU/mL     Negative            30 - 75  IU/mL     Borderline            > 75      IU/mL     Positive IU/mL  Cyclic Citrullinated Peptide AB (10.06.18 @ 08:45)    CCP Antibody IgG Interpretation: Negative: Method:  EIA  Cyclic Citrullinated Peptide Ab, IGG Reference Range:                 <20U          Negative                  20 - 39U  Weak Positive                   40 - 59U  Moderate Positive                  >60U         Strong Positive    Cyclic Cit Pep Result: <8 Units            RADIOLOGY & ADDITIONAL TESTS:

## 2018-10-10 NOTE — PROGRESS NOTE BEHAVIORAL HEALTH - SUMMARY
63 year old female, single, retired, domiciled with PPH significant for depression, anxiety, PTSD and no significant PMH presents initially with dizziness. Found to have presumptive pyelonephritis.  Psych CL consulted to assist with anxiety, depressive symptoms.    Patient meets criteria for unspecified depression- r/o MDD. She also meets criteria for unspecified anxiety. She carries a history of PTSD.   She seems to have a slightly lighter affect today. Her mentioning of having thoughts of going to sleep and not waking up, certainly is provocative, however she adamantly denies active SI intent or plan. I see this more as an exacerbation of her mood and frustration. Would consider further working up and treating dizziness. Will continue to offer support.    - continue lexapro 30mg daily  - mirtazapine 15mg HS  - continue to offer support  -re-consult neurology for vertigo  - consider speaking with patient in AM, with entire team (RN, PCA, medical team) to go over daily plan
63 year old female, single, retired, domiciled with PPH significant for depression, anxiety, PTSD and no significant PMH presents initially with dizziness. Found to have presumptive pyelonephritis.  Psych CL consulted to assist with anxiety, depressive symptoms.    Patient meets criteria for unspecified depression- r/o MDD. She also meets criteria for unspecified anxiety. She carries a history of PTSD.   She seems to have a slightly lighter affect today. Her mentioning of having thoughts of going to sleep and not waking up, certainly is provocative, however she adamantly denies active SI intent or plan. I see this more as an exacerbation of her mood and frustration. Would consider further working up and treating dizziness. Will continue to offer support.
63 year old female, single, retired, domiciled with PPH significant for depression, anxiety, PTSD and no significant PMH presents initially with dizziness. Found to have presumptive pyelonephritis.  Psych CL consulted to assist with anxiety, depressive symptoms.    Patient meets criteria for unspecified depression- r/o MDD. She also meets criteria for unspecified anxiety. She carries a history of PTSD.   In this setting would not make too many changes at once as she already is on over-max dose of lexapro. Seems that her medical issues continues to further exacerbate her mood. Her mentioning of having thoughts of going to sleep and not waking up, certainly is provocative, however she adamantly denies active SI intent or plan. I see this more as an exacerbation of her mood and frustration. Would consider further working up and treating dizziness. Will continue to offer support.    - continue lexapro 30mg daily  - mirtazapine 7.5mg HS  - continue to offer support  - consider re-consulting neurology  - consider speaking with patient in AM, with entire team (RN, PCA, medical team) to go over daily plan

## 2018-10-10 NOTE — PROGRESS NOTE BEHAVIORAL HEALTH - NSBHCHARTREVIEWVS_PSY_A_CORE FT
Vital Signs Last 24 Hrs  T(C): 36.5 (08 Oct 2018 14:10), Max: 36.7 (07 Oct 2018 20:50)  T(F): 97.7 (08 Oct 2018 14:10), Max: 98 (07 Oct 2018 20:50)  HR: 69 (08 Oct 2018 14:10) (52 - 69)  BP: 111/61 (08 Oct 2018 14:10) (111/61 - 149/70)  BP(mean): --  RR: 18 (08 Oct 2018 14:10) (18 - 18)  SpO2: 98% (08 Oct 2018 14:10) (98% - 99%)
Vital Signs Last 24 Hrs  T(C): 37 (10 Oct 2018 11:36), Max: 37 (10 Oct 2018 11:36)  T(F): 98.6 (10 Oct 2018 11:36), Max: 98.6 (10 Oct 2018 11:36)  HR: 60 (10 Oct 2018 11:36) (51 - 60)  BP: 106/65 (10 Oct 2018 11:36) (106/65 - 139/67)  BP(mean): --  RR: 18 (10 Oct 2018 11:36) (18 - 18)  SpO2: 98% (10 Oct 2018 11:36) (97% - 98%)
Vital Signs Last 24 Hrs  T(C): 36.5 (09 Oct 2018 09:22), Max: 36.6 (08 Oct 2018 20:16)  T(F): 97.7 (09 Oct 2018 09:22), Max: 97.9 (08 Oct 2018 20:16)  HR: 56 (09 Oct 2018 09:22) (54 - 69)  BP: 138/85 (09 Oct 2018 09:22) (111/61 - 138/85)  BP(mean): --  RR: 18 (09 Oct 2018 09:22) (18 - 18)  SpO2: 98% (09 Oct 2018 09:22) (98% - 98%)

## 2018-10-10 NOTE — PROGRESS NOTE ADULT - SUBJECTIVE AND OBJECTIVE BOX
Patient is a 63y old  Female who presents with a chief complaint of pericarditis, dizziness (10 Oct 2018 10:30)    Being followed by ID for uti    Interval history:still with diziness  No acute events      ROS:  No cough,SOB,CP  No N/V/D./abd pain  denies urinary complaints  No other complaints      Antimicrobials:  s/p ceftriaxone course    Other medications reviewed    Vital Signs Last 24 Hrs  T(C): 36.6 (10-10-18 @ 04:42), Max: 36.9 (10-09-18 @ 20:33)  T(F): 97.8 (10-10-18 @ 04:42), Max: 98.4 (10-09-18 @ 20:33)  HR: 51 (10-10-18 @ 04:42) (51 - 59)  BP: 139/67 (10-10-18 @ 04:42) (116/68 - 139/67)  BP(mean): --  RR: 18 (10-10-18 @ 04:42) (18 - 18)  SpO2: 98% (10-10-18 @ 04:42) (97% - 98%)    Physical Exam:        HEENT PERRLA EOMI    No oral exudate or erythema    Chest Good AE,CTA    CVS RRR S1 S2 WNl No murmur or rub or gallop    Abd soft BS normal No tenderness no masses    IV site no erythema tenderness or discharge    CNS AAO X 3 no focal    Lab Data:                          13.1   7.92  )-----------( 429      ( 09 Oct 2018 09:49 )             40.0       10-09    143  |  105  |  16  ----------------------------<  83  4.1   |  25  |  0.88    Ca    9.3      09 Oct 2018 07:48

## 2018-10-10 NOTE — PROGRESS NOTE BEHAVIORAL HEALTH - NSBHFUPINTERVALHXFT_PSY_A_CORE
Chart reviewed. Patient now being treated for suspected pyelo, pericardial effusion, and dizziness.    Patient seen this AM- she was sleeping. Decision was made to allow patient to sleep. Patient seen again this afternoon. She expresses frustration in her current condition. She states that she is now hopeless. She complains of dizziness, flank pain. She is frustrated by her medical condition. She finds herself at times thinking about going to sleep, and not waking up- but adamantly denies SI, intent or plan. She states that she is sleeping better with mirtazapine. She expresses frustration with her family and lack of sibling support. She agrees with continued supportive therapy.
She states that she continues to feel dizzy, nauseated. She is trying to eat. Still feels hopeless at times. Denies SI intent or plan. Pt still feels depressed, frustrated with her family. Pt reports improved sleep.
Patient seen this afternoon. She states that she continues to feel dizzy, nauseated. She is trying to eat. Still feels hopeless at times. Denies SI intent or plan. Is in agreement to increase mirtazapine today. Recognizes that her mood is worsened by her medical issues. Is grateful that the primary team will try a maneuver to help with vertigo.

## 2018-10-10 NOTE — PROGRESS NOTE ADULT - SUBJECTIVE AND OBJECTIVE BOX
CHIEF COMPLAINT:Patient is a 63y old  Female who presents with a chief complaint of pericarditis, dizziness (10 Oct 2018 10:30)    	        PAST MEDICAL & SURGICAL HISTORY:  Anxiety  No significant past surgical history          REVIEW OF SYSTEMS:  CONSTITUTIONAL: No fever, weight loss, or fatigue  EYES: No eye pain, visual disturbances, or discharge  NECK: No pain or stiffness  RESPIRATORY: No cough, wheezing, chills or hemoptysis; No Shortness of Breath  CARDIOVASCULAR: No chest pain, palpitations, passing out,   GASTROINTESTINAL: No abdominal or epigastric pain. No nausea, vomiting, or hematemesis; No diarrhea or constipation. No melena or hematochezia.  GENITOURINARY: No dysuria, frequency, hematuria, or incontinence  NEUROLOGICAL: No headaches, memory loss, loss of strength, still verry dizzy unable to walk or stand without support  MUSCULOSKELETAL: No joint pain or swelling; No muscle, back, or extremity pain    Medications:  MEDICATIONS  (STANDING):  ALPRAZolam 0.5 milliGRAM(s) Oral once  colchicine 0.6 milliGRAM(s) Oral two times a day  escitalopram 30 milliGRAM(s) Oral daily  heparin  Injectable 5000 Unit(s) SubCutaneous every 12 hours  influenza   Vaccine 0.5 milliLiter(s) IntraMuscular once  mirtazapine 15 milliGRAM(s) Oral at bedtime    MEDICATIONS  (PRN):  meclizine 12.5 milliGRAM(s) Oral every 8 hours PRN Dizziness  ondansetron Injectable 4 milliGRAM(s) IV Push every 6 hours PRN Nausea    	    PHYSICAL EXAM:  T(C): 36.6 (10-10-18 @ 04:42), Max: 36.9 (10-09-18 @ 20:33)  HR: 51 (10-10-18 @ 04:42) (51 - 59)  BP: 139/67 (10-10-18 @ 04:42) (116/68 - 139/67)  RR: 18 (10-10-18 @ 04:42) (18 - 18)  SpO2: 98% (10-10-18 @ 04:42) (97% - 98%)  Wt(kg): --  I&O's Summary    09 Oct 2018 07:01  -  10 Oct 2018 07:00  --------------------------------------------------------  IN: 390 mL / OUT: 0 mL / NET: 390 mL        Appearance: Normal	  HEENT:   Normal oral mucosa, PERRL, EOMI	  Lymphatic: No lymphadenopathy  Cardiovascular: Normal S1 S2, No JVD, No murmurs, No edema  Respiratory: Lungs clear to auscultation	  Psychiatry: A & O x 3,   Gastrointestinal:  Soft, Non-tender, + BS	  Skin: No rashes, No ecchymoses, No cyanosis	  Neurologic: Non-focal from motor equal  + vertiginous symptoms  Extremities: Normal range of motion, No clubbing, cyanosis or edema  Vascular: Peripheral pulses palpable 2+ bilaterally    TELEMETRY: 	    ECG:  	  RADIOLOGY:  OTHER: 	  	  LABS:	 	    CARDIAC MARKERS:                                13.1   7.92  )-----------( 429      ( 09 Oct 2018 09:49 )             40.0     10-09    143  |  105  |  16  ----------------------------<  83  4.1   |  25  |  0.88    Ca    9.3      09 Oct 2018 07:48      proBNP:   Lipid Profile:   HgA1c:   TSH:

## 2018-10-10 NOTE — PROGRESS NOTE ADULT - SUBJECTIVE AND OBJECTIVE BOX
NEUROLOGY:    Pt reports meclizine helps with decreasing intensity but vertigo persists, worst with turning head to right    Medications:  colchicine 0.6 milliGRAM(s) Oral two times a day  escitalopram 30 milliGRAM(s) Oral daily  heparin  Injectable 5000 Unit(s) SubCutaneous every 12 hours  ibuprofen  Tablet. 600 milliGRAM(s) Oral three times a day  influenza   Vaccine 0.5 milliLiter(s) IntraMuscular once  meclizine 12.5 milliGRAM(s) Oral every 8 hours PRN  mirtazapine 15 milliGRAM(s) Oral at bedtime  ondansetron Injectable 4 milliGRAM(s) IV Push every 6 hours PRN      Labs:  CBC Full  -  ( 09 Oct 2018 09:49 )  WBC Count : 7.92 K/uL  Hemoglobin : 13.1 g/dL  Hematocrit : 40.0 %  Platelet Count - Automated : 429 K/uL  Mean Cell Volume : 88.7 fl  Mean Cell Hemoglobin : 29.0 pg  Mean Cell Hemoglobin Concentration : 32.8 gm/dL  Auto Neutrophil # : x  Auto Lymphocyte # : x  Auto Monocyte # : x  Auto Eosinophil # : x  Auto Basophil # : x  Auto Neutrophil % : x  Auto Lymphocyte % : x  Auto Monocyte % : x  Auto Eosinophil % : x  Auto Basophil % : x    10-09    143  |  105  |  16  ----------------------------<  83  4.1   |  25  |  0.88    Ca    9.3      09 Oct 2018 07:48      CAPILLARY BLOOD GLUCOSE                  Vitals:  Vital Signs Last 24 Hrs  T(C): 36.6 (10 Oct 2018 04:42), Max: 36.9 (09 Oct 2018 20:33)  T(F): 97.8 (10 Oct 2018 04:42), Max: 98.4 (09 Oct 2018 20:33)  HR: 51 (10 Oct 2018 04:42) (51 - 59)  BP: 139/67 (10 Oct 2018 04:42) (116/68 - 139/67)  BP(mean): --  RR: 18 (10 Oct 2018 04:42) (18 - 18)  SpO2: 98% (10 Oct 2018 04:42) (97% - 98%)    NEUROLOGICAL EXAM:    Mental status: Awake, alert, and in no apparent distress. Oriented x3. Language function is normal.    Cranial Nerves: Pupils were equal, round, reactive to light. Extraocular movements were intact but uncomfortable with eye movements, able to visual bilateral endgaze nystagmus. Fundoscopic exam was deferred. Facial sensation was intact to light touch. There was no facial asymmetry. The palate was upgoing symmetrically and tongue was midline.     Motor exam: Bulk and tone were normal. Strength was 5/5 in all four extremities. Fine finger movements were symmetric and normal. There was no pronator drift    Reflexes: 2+ in the bilateral upper extremities. 2+ in the bilateral lower extremities. Toes were downgoing bilaterally.     Sensation: Intact to light touch    Coordination: Finger-nose-finger without dysmetria.     Gait: deferred    NIHSS=0.    < from: CT Head No Cont (10.04.18 @ 10:34) >  EXAM:  CT BRAIN                            PROCEDURE DATE:  10/04/2018      INTERPRETATION:  History: Syncope. Headaches. Hit head.    Description: A noncontrast head CT was performed. Axial images were   performed from the skull base to the vertex with coronal/sagittal   reconstructions.    Comparison is made to a brain MRI from 12/04/2017, head CT 10/19/2007.    There is no evidence for acute infarct, acute hemorrhage, mass effect,   calvarial fracture, or hydrocephalus.    Minimal patchy hypodensity without mass effect is noted in the   periventricular white matter which most likely represents chronic   microvascular ischemic changes given the patient's age.    Cerebral volume loss is present with secondary proportional prominence of   the sulci and ventricles.    No lytic or blastic calvarial lesions are noted. The visualized portions   of the paranasal sinuses and mastoid air cells are clear.    IMPRESSION:    No acute intracranial pathology is noted. If the patient has new and   persistent symptoms, consider short interval follow-up head CT or brain   MRI follow-up if there are no MRI contraindications.    JADEN COOK M.D., ATTENDING RADIOLOGIST  This document has been electronically signed. Oct  4 2018 10:40AM

## 2018-10-10 NOTE — PROGRESS NOTE BEHAVIORAL HEALTH - NSBHCONSULTMEDS_PSY_A_CORE FT
mirtazapine 7.5mg HS  lexapro 30mg daily (home dose)
cont remeron 15mg po qhs   lexapro 30mg daily (home dose)
mirtazapine 7.5mg HS  lexapro 30mg daily (home dose)

## 2018-10-10 NOTE — PROGRESS NOTE BEHAVIORAL HEALTH - NSBHCHARTREVIEWLAB_PSY_A_CORE FT
12.4   7.81  )-----------( 426      ( 08 Oct 2018 08:06 )             38.7   10-08    144  |  107  |  15  ----------------------------<  90  4.2   |  28  |  0.88    Ca    9.0      08 Oct 2018 06:39
13.1   7.92  )-----------( 429      ( 09 Oct 2018 09:49 )             40.0     10-09    143  |  105  |  16  ----------------------------<  83  4.1   |  25  |  0.88    Ca    9.3      09 Oct 2018 07:48
13.1   7.92  )-----------( 429      ( 09 Oct 2018 09:49 )             40.0   10-09    143  |  105  |  16  ----------------------------<  83  4.1   |  25  |  0.88    Ca    9.3      09 Oct 2018 07:48

## 2018-10-10 NOTE — PROGRESS NOTE BEHAVIORAL HEALTH - RISK ASSESSMENT
Patient denies active SI, has no history of SA, is sober, has protective factors against suicide. She is low risk for self harm.

## 2018-10-11 RX ADMIN — Medication 0.6 MILLIGRAM(S): at 05:14

## 2018-10-11 RX ADMIN — MIRTAZAPINE 15 MILLIGRAM(S): 45 TABLET, ORALLY DISINTEGRATING ORAL at 23:10

## 2018-10-11 RX ADMIN — Medication 12.5 MILLIGRAM(S): at 23:30

## 2018-10-11 RX ADMIN — HEPARIN SODIUM 5000 UNIT(S): 5000 INJECTION INTRAVENOUS; SUBCUTANEOUS at 17:35

## 2018-10-11 RX ADMIN — HEPARIN SODIUM 5000 UNIT(S): 5000 INJECTION INTRAVENOUS; SUBCUTANEOUS at 05:14

## 2018-10-11 RX ADMIN — ONDANSETRON 4 MILLIGRAM(S): 8 TABLET, FILM COATED ORAL at 11:55

## 2018-10-11 RX ADMIN — ESCITALOPRAM OXALATE 30 MILLIGRAM(S): 10 TABLET, FILM COATED ORAL at 11:56

## 2018-10-11 RX ADMIN — Medication 12.5 MILLIGRAM(S): at 11:56

## 2018-10-11 RX ADMIN — Medication 0.6 MILLIGRAM(S): at 17:35

## 2018-10-11 NOTE — PROGRESS NOTE ADULT - SUBJECTIVE AND OBJECTIVE BOX
CHIEF COMPLAINT:Patient is a 63y old  Female who presents with a chief complaint of pericarditis, dizziness (10 Oct 2018 10:30)  s/p vestibular study today, very dizzy and nauseous    PAST MEDICAL & SURGICAL HISTORY:  Anxiety  No significant past surgical history          REVIEW OF SYSTEMS:  CONSTITUTIONAL: No fever, weight loss, or fatigue  EYES: No eye pain, visual disturbances, or discharge  NECK: No pain or stiffness  RESPIRATORY: No cough, wheezing, chills or hemoptysis; No Shortness of Breath  CARDIOVASCULAR: No chest pain, palpitations, passing out,  GASTROINTESTINAL: No abdominal or epigastric pain. + nausea, no vomiting, or hematemesis; No diarrhea or constipation. No melena or hematochezia.  GENITOURINARY: No dysuria, frequency, hematuria, or incontinence  NEUROLOGICAL: No headaches, memory loss, loss of strength, still verry dizzy unable to walk or stand without support  MUSCULOSKELETAL: No joint pain or swelling; No muscle, back, or extremity pain      Medications:  MEDICATIONS  (STANDING):  ALPRAZolam 0.5 milliGRAM(s) Oral once  colchicine 0.6 milliGRAM(s) Oral two times a day  escitalopram 30 milliGRAM(s) Oral daily  heparin  Injectable 5000 Unit(s) SubCutaneous every 12 hours  influenza   Vaccine 0.5 milliLiter(s) IntraMuscular once  mirtazapine 15 milliGRAM(s) Oral at bedtime    MEDICATIONS  (PRN):  meclizine 12.5 milliGRAM(s) Oral every 8 hours PRN Dizziness  ondansetron Injectable 4 milliGRAM(s) IV Push every 6 hours PRN Nausea    	    PHYSICAL EXAM:  T(C): 36.6 (10-11-18 @ 08:00), Max: 37.4 (10-10-18 @ 16:09)  HR: 64 (10-11-18 @ 08:00) (57 - 73)  BP: 127/67 (10-11-18 @ 08:00) (105/65 - 136/75)  RR: 18 (10-11-18 @ 08:00) (18 - 18)  SpO2: 95% (10-11-18 @ 08:00) (95% - 100%)  Wt(kg): --  I&O's Summary    10 Oct 2018 07:01  -  11 Oct 2018 07:00  --------------------------------------------------------  IN: 1060 mL / OUT: 0 mL / NET: 1060 mL    11 Oct 2018 07:01  -  11 Oct 2018 12:33  --------------------------------------------------------  IN: 360 mL / OUT: 0 mL / NET: 360 mL      Appearance: Normal	  HEENT:   Normal oral mucosa, PERRL, EOMI	  Lymphatic: No lymphadenopathy  Cardiovascular: Normal S1 S2, No JVD, No murmurs, No edema  Respiratory: Lungs clear to auscultation	  Psychiatry: A & O x 3,   Gastrointestinal:  Soft, Non-tender, + BS	  Skin: No rashes, No ecchymoses, No cyanosis	  Neurologic: Non-focal from motor equal  + vertiginous symptoms  Extremities: Normal range of motion, No clubbing, cyanosis or edema  Vascular: Peripheral pulses palpable 2+ bilaterally    LABS:	 	    CARDIAC MARKERS:                  proBNP:   Lipid Profile:   HgA1c:   TSH:

## 2018-10-11 NOTE — PROGRESS NOTE ADULT - SUBJECTIVE AND OBJECTIVE BOX
NEUROLOGY:    Pt reports meclizine helps with decreasing intensity but vertigo persists, worst with turning head to right  She has not improved    Medications:  ALPRAZolam 0.5 milliGRAM(s) Oral once  colchicine 0.6 milliGRAM(s) Oral two times a day  escitalopram 30 milliGRAM(s) Oral daily  heparin  Injectable 5000 Unit(s) SubCutaneous every 12 hours  influenza   Vaccine 0.5 milliLiter(s) IntraMuscular once  meclizine 12.5 milliGRAM(s) Oral every 8 hours PRN  mirtazapine 15 milliGRAM(s) Oral at bedtime  ondansetron Injectable 4 milliGRAM(s) IV Push every 6 hours PRN      Labs:  CBC Full  -  ( 09 Oct 2018 09:49 )  WBC Count : 7.92 K/uL  Hemoglobin : 13.1 g/dL  Hematocrit : 40.0 %  Platelet Count - Automated : 429 K/uL  Mean Cell Volume : 88.7 fl  Mean Cell Hemoglobin : 29.0 pg  Mean Cell Hemoglobin Concentration : 32.8 gm/dL  Auto Neutrophil # : x  Auto Lymphocyte # : x  Auto Monocyte # : x  Auto Eosinophil # : x  Auto Basophil # : x  Auto Neutrophil % : x  Auto Lymphocyte % : x  Auto Monocyte % : x  Auto Eosinophil % : x  Auto Basophil % : x          CAPILLARY BLOOD GLUCOSE                  Vitals:  Vital Signs Last 24 Hrs  T(C): 36.6 (11 Oct 2018 04:34), Max: 37.4 (10 Oct 2018 16:09)  T(F): 97.9 (11 Oct 2018 04:34), Max: 99.3 (10 Oct 2018 16:09)  HR: 59 (11 Oct 2018 04:34) (57 - 73)  BP: 105/65 (11 Oct 2018 04:34) (105/65 - 136/75)  BP(mean): --  RR: 18 (11 Oct 2018 04:34) (18 - 18)  SpO2: 96% (11 Oct 2018 04:34) (96% - 100%)    NEUROLOGICAL EXAM:    Mental status: Awake, alert, and in no apparent distress. Oriented x3. Language function is normal.    Cranial Nerves: Pupils were equal, round, reactive to light. Extraocular movements were intact but uncomfortable with eye movements, able to visual bilateral endgaze nystagmus. Facial sensation was intact to light touch. There was no facial asymmetry. The palate was upgoing symmetrically and tongue was midline.     Motor exam: Bulk and tone were normal. Strength was 5/5 in all four extremities. Fine finger movements were symmetric and normal. There was no pronator drift    Reflexes: 2+ in the bilateral upper extremities. 2+ in the bilateral lower extremities. Toes were downgoing bilaterally.     Sensation: Intact to light touch    Coordination: Finger-nose-finger without dysmetria.     Gait: deferred    NIHSS=0.    < from: CT Head No Cont (10.04.18 @ 10:34) >  EXAM:  CT BRAIN                            PROCEDURE DATE:  10/04/2018      INTERPRETATION:  History: Syncope. Headaches. Hit head.    Description: A noncontrast head CT was performed. Axial images were   performed from the skull base to the vertex with coronal/sagittal   reconstructions.    Comparison is made to a brain MRI from 12/04/2017, head CT 10/19/2007.    There is no evidence for acute infarct, acute hemorrhage, mass effect,   calvarial fracture, or hydrocephalus.    Minimal patchy hypodensity without mass effect is noted in the   periventricular white matter which most likely represents chronic   microvascular ischemic changes given the patient's age.    Cerebral volume loss is present with secondary proportional prominence of   the sulci and ventricles.    No lytic or blastic calvarial lesions are noted. The visualized portions   of the paranasal sinuses and mastoid air cells are clear.    IMPRESSION:    No acute intracranial pathology is noted. If the patient has new and   persistent symptoms, consider short interval follow-up head CT or brain   MRI follow-up if there are no MRI contraindications.    JADEN COOK M.D., ATTENDING RADIOLOGIST  This document has been electronically signed. Oct  4 2018 10:40AM

## 2018-10-12 LAB
ANION GAP SERPL CALC-SCNC: 8 MMOL/L — SIGNIFICANT CHANGE UP (ref 5–17)
BUN SERPL-MCNC: 16 MG/DL — SIGNIFICANT CHANGE UP (ref 7–23)
CALCIUM SERPL-MCNC: 9.5 MG/DL — SIGNIFICANT CHANGE UP (ref 8.4–10.5)
CHLORIDE SERPL-SCNC: 104 MMOL/L — SIGNIFICANT CHANGE UP (ref 96–108)
CO2 SERPL-SCNC: 28 MMOL/L — SIGNIFICANT CHANGE UP (ref 22–31)
CREAT SERPL-MCNC: 0.89 MG/DL — SIGNIFICANT CHANGE UP (ref 0.5–1.3)
GLUCOSE SERPL-MCNC: 90 MG/DL — SIGNIFICANT CHANGE UP (ref 70–99)
HCT VFR BLD CALC: 41.8 % — SIGNIFICANT CHANGE UP (ref 34.5–45)
HGB BLD-MCNC: 13.2 G/DL — SIGNIFICANT CHANGE UP (ref 11.5–15.5)
MCHC RBC-ENTMCNC: 28.6 PG — SIGNIFICANT CHANGE UP (ref 27–34)
MCHC RBC-ENTMCNC: 31.6 GM/DL — LOW (ref 32–36)
MCV RBC AUTO: 90.5 FL — SIGNIFICANT CHANGE UP (ref 80–100)
PLATELET # BLD AUTO: 408 K/UL — HIGH (ref 150–400)
POTASSIUM SERPL-MCNC: 4.2 MMOL/L — SIGNIFICANT CHANGE UP (ref 3.5–5.3)
POTASSIUM SERPL-SCNC: 4.2 MMOL/L — SIGNIFICANT CHANGE UP (ref 3.5–5.3)
RBC # BLD: 4.62 M/UL — SIGNIFICANT CHANGE UP (ref 3.8–5.2)
RBC # FLD: 14.4 % — SIGNIFICANT CHANGE UP (ref 10.3–14.5)
SODIUM SERPL-SCNC: 140 MMOL/L — SIGNIFICANT CHANGE UP (ref 135–145)
WBC # BLD: 7 K/UL — SIGNIFICANT CHANGE UP (ref 3.8–10.5)
WBC # FLD AUTO: 7 K/UL — SIGNIFICANT CHANGE UP (ref 3.8–10.5)

## 2018-10-12 PROCEDURE — 70551 MRI BRAIN STEM W/O DYE: CPT | Mod: 26

## 2018-10-12 RX ADMIN — Medication 0.6 MILLIGRAM(S): at 06:12

## 2018-10-12 RX ADMIN — Medication 12.5 MILLIGRAM(S): at 18:21

## 2018-10-12 RX ADMIN — HEPARIN SODIUM 5000 UNIT(S): 5000 INJECTION INTRAVENOUS; SUBCUTANEOUS at 18:08

## 2018-10-12 RX ADMIN — Medication 0.5 MILLIGRAM(S): at 14:54

## 2018-10-12 RX ADMIN — Medication 0.6 MILLIGRAM(S): at 18:08

## 2018-10-12 RX ADMIN — HEPARIN SODIUM 5000 UNIT(S): 5000 INJECTION INTRAVENOUS; SUBCUTANEOUS at 06:12

## 2018-10-12 RX ADMIN — ESCITALOPRAM OXALATE 30 MILLIGRAM(S): 10 TABLET, FILM COATED ORAL at 11:55

## 2018-10-12 NOTE — PROGRESS NOTE ADULT - SUBJECTIVE AND OBJECTIVE BOX
SUBJECTIVE: still dizzy.  less so after maneuver.      Medications:  MEDICATIONS  (STANDING):  ALPRAZolam 0.5 milliGRAM(s) Oral once  colchicine 0.6 milliGRAM(s) Oral two times a day  escitalopram 30 milliGRAM(s) Oral daily  heparin  Injectable 5000 Unit(s) SubCutaneous every 12 hours  influenza   Vaccine 0.5 milliLiter(s) IntraMuscular once  mirtazapine 15 milliGRAM(s) Oral at bedtime    MEDICATIONS  (PRN):  meclizine 12.5 milliGRAM(s) Oral every 8 hours PRN Dizziness  ondansetron Injectable 4 milliGRAM(s) IV Push every 6 hours PRN Nausea      Labs:  CBC Full  -  ( 12 Oct 2018 08:14 )  WBC Count : 7.00 K/uL  Hemoglobin : 13.2 g/dL  Hematocrit : 41.8 %  Platelet Count - Automated : 408 K/uL  Mean Cell Volume : 90.5 fl  Mean Cell Hemoglobin : 28.6 pg  Mean Cell Hemoglobin Concentration : 31.6 gm/dL  Auto Neutrophil # : x  Auto Lymphocyte # : x  Auto Monocyte # : x  Auto Eosinophil # : x  Auto Basophil # : x  Auto Neutrophil % : x  Auto Lymphocyte % : x  Auto Monocyte % : x  Auto Eosinophil % : x  Auto Basophil % : x    10-12    140  |  104  |  16  ----------------------------<  90  4.2   |  28  |  0.89    Ca    9.5      12 Oct 2018 06:45      CAPILLARY BLOOD GLUCOSE                Vitals:  Vital Signs Last 24 Hrs  T(C): 36.4 (12 Oct 2018 08:00), Max: 36.8 (12 Oct 2018 00:21)  T(F): 97.6 (12 Oct 2018 08:00), Max: 98.2 (12 Oct 2018 00:21)  HR: 57 (12 Oct 2018 08:00) (57 - 71)  BP: 124/74 (12 Oct 2018 08:00) (106/63 - 124/74)  BP(mean): --  RR: 18 (12 Oct 2018 08:00) (18 - 18)  SpO2: 96% (12 Oct 2018 08:00) (96% - 97%)        NEUROLOGICAL EXAM:    Mental status: Awake, alert, and in no apparent distress. Oriented x3. Language function is normal.    Cranial Nerves: Pupils were equal, round, reactive to light. Extraocular movements were intact but uncomfortable with eye movements, able to visual bilateral endgaze nystagmus. Facial sensation was intact to light touch. There was no facial asymmetry. The palate was upgoing symmetrically and tongue was midline.     Motor exam: Bulk and tone were normal. Strength was 5/5 in all four extremities. Fine finger movements were symmetric and normal. There was no pronator drift    Reflexes: 2+ in the bilateral upper extremities. 2+ in the bilateral lower extremities. Toes were downgoing bilaterally.     Sensation: Intact to light touch    Coordination: Finger-nose-finger without dysmetria.     Gait: deferred    NIHSS=0.    < from: CT Head No Cont (10.04.18 @ 10:34) >  EXAM:  CT BRAIN                            PROCEDURE DATE:  10/04/2018      INTERPRETATION:  History: Syncope. Headaches. Hit head.    Description: A noncontrast head CT was performed. Axial images were   performed from the skull base to the vertex with coronal/sagittal   reconstructions.    Comparison is made to a brain MRI from 12/04/2017, head CT 10/19/2007.    There is no evidence for acute infarct, acute hemorrhage, mass effect,   calvarial fracture, or hydrocephalus.    Minimal patchy hypodensity without mass effect is noted in the   periventricular white matter which most likely represents chronic   microvascular ischemic changes given the patient's age.    Cerebral volume loss is present with secondary proportional prominence of   the sulci and ventricles.    No lytic or blastic calvarial lesions are noted. The visualized portions   of the paranasal sinuses and mastoid air cells are clear.    IMPRESSION:    No acute intracranial pathology is noted. If the patient has new and   persistent symptoms, consider short interval follow-up head CT or brain   MRI follow-up if there are no MRI contraindications.    JADEN COOK M.D., ATTENDING RADIOLOGIST  This document has been electronically signed. Oct  4 2018 10:40AM

## 2018-10-12 NOTE — PROGRESS NOTE ADULT - SUBJECTIVE AND OBJECTIVE BOX
CHIEF COMPLAINT:Patient is a 63y old  Female who presents with a chief complaint of pericarditis, dizziness (10 Oct 2018 10:30)  no acute events    PAST MEDICAL & SURGICAL HISTORY:  Anxiety  No significant past surgical history          REVIEW OF SYSTEMS:  CONSTITUTIONAL: No fever, weight loss, or fatigue  EYES: No eye pain, visual disturbances, or discharge  NECK: No pain or stiffness  RESPIRATORY: No cough, wheezing, chills or hemoptysis; No Shortness of Breath  CARDIOVASCULAR: No chest pain, palpitations, passing out,  GASTROINTESTINAL: No abdominal or epigastric pain. + nausea, no vomiting, or hematemesis; No diarrhea or constipation. No melena or hematochezia.  GENITOURINARY: No dysuria, frequency, hematuria, or incontinence  NEUROLOGICAL: No headaches, memory loss, loss of strength, still some dizziness  MUSCULOSKELETAL: No joint pain or swelling; No muscle, back, or extremity pain      Medications:  MEDICATIONS  (STANDING):  ALPRAZolam 0.5 milliGRAM(s) Oral once  colchicine 0.6 milliGRAM(s) Oral two times a day  escitalopram 30 milliGRAM(s) Oral daily  heparin  Injectable 5000 Unit(s) SubCutaneous every 12 hours  influenza   Vaccine 0.5 milliLiter(s) IntraMuscular once  mirtazapine 15 milliGRAM(s) Oral at bedtime    MEDICATIONS  (PRN):  meclizine 12.5 milliGRAM(s) Oral every 8 hours PRN Dizziness  ondansetron Injectable 4 milliGRAM(s) IV Push every 6 hours PRN Nausea    	    PHYSICAL EXAM:  T(C): 36.6 (10-12-18 @ 12:10), Max: 36.8 (10-12-18 @ 00:21)  HR: 62 (10-12-18 @ 12:10) (57 - 71)  BP: 116/68 (10-12-18 @ 12:10) (106/63 - 124/74)  RR: 18 (10-12-18 @ 12:10) (18 - 18)  SpO2: 96% (10-12-18 @ 12:10) (96% - 97%)  Wt(kg): --  I&O's Summary    11 Oct 2018 07:01  -  12 Oct 2018 07:00  --------------------------------------------------------  IN: 720 mL / OUT: 0 mL / NET: 720 mL      Appearance: Normal	  HEENT:   Normal oral mucosa, PERRL, EOMI	  Lymphatic: No lymphadenopathy  Cardiovascular: Normal S1 S2, No JVD, No murmurs, No edema  Respiratory: Lungs clear to auscultation	  Psychiatry: A & O x 3,   Gastrointestinal:  Soft, Non-tender, + BS	  Skin: No rashes, No ecchymoses, No cyanosis	  Neurologic: Non-focal from motor equal  + vertiginous symptoms  Extremities: Normal range of motion, No clubbing, cyanosis or edema  Vascular: Peripheral pulses palpable 2+ bilaterally    LABS:	 	    CARDIAC MARKERS:                                13.2   7.00  )-----------( 408      ( 12 Oct 2018 08:14 )             41.8     10-12    140  |  104  |  16  ----------------------------<  90  4.2   |  28  |  0.89    Ca    9.5      12 Oct 2018 06:45      proBNP:   Lipid Profile:   HgA1c:   TSH:

## 2018-10-13 RX ADMIN — MIRTAZAPINE 15 MILLIGRAM(S): 45 TABLET, ORALLY DISINTEGRATING ORAL at 00:25

## 2018-10-13 RX ADMIN — Medication 12.5 MILLIGRAM(S): at 00:25

## 2018-10-13 RX ADMIN — Medication 12.5 MILLIGRAM(S): at 13:02

## 2018-10-13 RX ADMIN — HEPARIN SODIUM 5000 UNIT(S): 5000 INJECTION INTRAVENOUS; SUBCUTANEOUS at 06:50

## 2018-10-13 RX ADMIN — Medication 0.6 MILLIGRAM(S): at 06:50

## 2018-10-13 RX ADMIN — MIRTAZAPINE 15 MILLIGRAM(S): 45 TABLET, ORALLY DISINTEGRATING ORAL at 23:29

## 2018-10-13 RX ADMIN — Medication 12.5 MILLIGRAM(S): at 20:09

## 2018-10-13 RX ADMIN — Medication 0.6 MILLIGRAM(S): at 17:54

## 2018-10-13 RX ADMIN — HEPARIN SODIUM 5000 UNIT(S): 5000 INJECTION INTRAVENOUS; SUBCUTANEOUS at 17:54

## 2018-10-13 RX ADMIN — ESCITALOPRAM OXALATE 30 MILLIGRAM(S): 10 TABLET, FILM COATED ORAL at 13:02

## 2018-10-13 NOTE — PROGRESS NOTE ADULT - SUBJECTIVE AND OBJECTIVE BOX
CHIEF COMPLAINT:Patient is a 63y old  Female who presents with a chief complaint of pericarditis, dizziness (10 Oct 2018 10:30)  no acute events    PAST MEDICAL & SURGICAL HISTORY:  Anxiety  No significant past surgical history          REVIEW OF SYSTEMS:  CONSTITUTIONAL: No fever, weight loss, or fatigue  EYES: No eye pain, visual disturbances, or discharge  NECK: No pain or stiffness  RESPIRATORY: No cough, wheezing, chills or hemoptysis; No Shortness of Breath  CARDIOVASCULAR: No chest pain, palpitations, passing out,  GASTROINTESTINAL: No abdominal or epigastric pain. + nausea, no vomiting, or hematemesis; No diarrhea or constipation. No melena or hematochezia.  GENITOURINARY: No dysuria, frequency, hematuria, or incontinence  NEUROLOGICAL: No headaches, memory loss, loss of strength, still some dizziness  MUSCULOSKELETAL: No joint pain or swelling; No muscle, back, or extremity pain      Medications:  MEDICATIONS  (STANDING):  colchicine 0.6 milliGRAM(s) Oral two times a day  escitalopram 30 milliGRAM(s) Oral daily  heparin  Injectable 5000 Unit(s) SubCutaneous every 12 hours  influenza   Vaccine 0.5 milliLiter(s) IntraMuscular once  mirtazapine 15 milliGRAM(s) Oral at bedtime    MEDICATIONS  (PRN):  meclizine 12.5 milliGRAM(s) Oral every 8 hours PRN Dizziness  ondansetron Injectable 4 milliGRAM(s) IV Push every 6 hours PRN Nausea    	    PHYSICAL EXAM:  T(C): 36.6 (10-13-18 @ 06:50), Max: 36.7 (10-13-18 @ 00:21)  HR: 59 (10-13-18 @ 06:50) (59 - 65)  BP: 135/78 (10-13-18 @ 06:50) (114/71 - 135/78)  RR: 16 (10-13-18 @ 06:50) (16 - 18)  SpO2: 99% (10-13-18 @ 06:50) (96% - 99%)  Wt(kg): --  I&O's Summary    12 Oct 2018 07:01  -  13 Oct 2018 07:00  --------------------------------------------------------  IN: 280 mL / OUT: 0 mL / NET: 280 mL      Appearance: Normal	  HEENT:   Normal oral mucosa, PERRL, EOMI	  Lymphatic: No lymphadenopathy  Cardiovascular: Normal S1 S2, No JVD, No murmurs, No edema  Respiratory: Lungs clear to auscultation	  Psychiatry: A & O x 3,   Gastrointestinal:  Soft, Non-tender, + BS	  Skin: No rashes, No ecchymoses, No cyanosis	  Neurologic: Non-focal from motor equal  + vertiginous symptoms  Extremities: Normal range of motion, No clubbing, cyanosis or edema  Vascular: Peripheral pulses palpable 2+ bilaterally    LABS:	 	    CARDIAC MARKERS:                                13.2   7.00  )-----------( 408      ( 12 Oct 2018 08:14 )             41.8     10-12    140  |  104  |  16  ----------------------------<  90  4.2   |  28  |  0.89    Ca    9.5      12 Oct 2018 06:45      proBNP:   Lipid Profile:   HgA1c:   TSH:

## 2018-10-13 NOTE — PROGRESS NOTE ADULT - SUBJECTIVE AND OBJECTIVE BOX
SUBJECTIVE: still dizzy.  awaiting repeat epley maneuver    Medications:  MEDICATIONS  (STANDING):  ALPRAZolam 0.5 milliGRAM(s) Oral once  colchicine 0.6 milliGRAM(s) Oral two times a day  escitalopram 30 milliGRAM(s) Oral daily  heparin  Injectable 5000 Unit(s) SubCutaneous every 12 hours  influenza   Vaccine 0.5 milliLiter(s) IntraMuscular once  mirtazapine 15 milliGRAM(s) Oral at bedtime    MEDICATIONS  (PRN):  meclizine 12.5 milliGRAM(s) Oral every 8 hours PRN Dizziness  ondansetron Injectable 4 milliGRAM(s) IV Push every 6 hours PRN Nausea      Labs:  CBC Full  -  ( 12 Oct 2018 08:14 )  WBC Count : 7.00 K/uL  Hemoglobin : 13.2 g/dL  Hematocrit : 41.8 %  Platelet Count - Automated : 408 K/uL  Mean Cell Volume : 90.5 fl  Mean Cell Hemoglobin : 28.6 pg  Mean Cell Hemoglobin Concentration : 31.6 gm/dL  Auto Neutrophil # : x  Auto Lymphocyte # : x  Auto Monocyte # : x  Auto Eosinophil # : x  Auto Basophil # : x  Auto Neutrophil % : x  Auto Lymphocyte % : x  Auto Monocyte % : x  Auto Eosinophil % : x  Auto Basophil % : x    10-12    140  |  104  |  16  ----------------------------<  90  4.2   |  28  |  0.89    Ca    9.5      12 Oct 2018 06:45      CAPILLARY BLOOD GLUCOSE                Vitals:  Vital Signs Last 24 Hrs  T(C): 36.4 (12 Oct 2018 08:00), Max: 36.8 (12 Oct 2018 00:21)  T(F): 97.6 (12 Oct 2018 08:00), Max: 98.2 (12 Oct 2018 00:21)  HR: 57 (12 Oct 2018 08:00) (57 - 71)  BP: 124/74 (12 Oct 2018 08:00) (106/63 - 124/74)  BP(mean): --  RR: 18 (12 Oct 2018 08:00) (18 - 18)  SpO2: 96% (12 Oct 2018 08:00) (96% - 97%)        NEUROLOGICAL EXAM:    Mental status: Awake, alert, and in no apparent distress. Oriented x3. Language function is normal.    Cranial Nerves: Pupils were equal, round, reactive to light. Extraocular movements were intact but uncomfortable with eye movements, able to visual bilateral endgaze nystagmus. Facial sensation was intact to light touch. There was no facial asymmetry. The palate was upgoing symmetrically and tongue was midline.     Motor exam: Bulk and tone were normal. Strength was 5/5 in all four extremities. Fine finger movements were symmetric and normal. There was no pronator drift    Reflexes: 2+ in the bilateral upper extremities. 2+ in the bilateral lower extremities. Toes were downgoing bilaterally.     Sensation: Intact to light touch    Coordination: Finger-nose-finger without dysmetria.     Gait: deferred    NIHSS=0.    < from: CT Head No Cont (10.04.18 @ 10:34) >  EXAM:  CT BRAIN                            PROCEDURE DATE:  10/04/2018      INTERPRETATION:  History: Syncope. Headaches. Hit head.    Description: A noncontrast head CT was performed. Axial images were   performed from the skull base to the vertex with coronal/sagittal   reconstructions.    Comparison is made to a brain MRI from 12/04/2017, head CT 10/19/2007.    There is no evidence for acute infarct, acute hemorrhage, mass effect,   calvarial fracture, or hydrocephalus.    Minimal patchy hypodensity without mass effect is noted in the   periventricular white matter which most likely represents chronic   microvascular ischemic changes given the patient's age.    Cerebral volume loss is present with secondary proportional prominence of   the sulci and ventricles.    No lytic or blastic calvarial lesions are noted. The visualized portions   of the paranasal sinuses and mastoid air cells are clear.    IMPRESSION:    No acute intracranial pathology is noted. If the patient has new and   persistent symptoms, consider short interval follow-up head CT or brain   MRI follow-up if there are no MRI contraindications.    JADEN COOK M.D., ATTENDING RADIOLOGIST  This document has been electronically signed. Oct  4 2018 10:40AM

## 2018-10-14 LAB — TSH SERPL-MCNC: 1.61 UIU/ML — SIGNIFICANT CHANGE UP (ref 0.27–4.2)

## 2018-10-14 PROCEDURE — 93010 ELECTROCARDIOGRAM REPORT: CPT

## 2018-10-14 RX ADMIN — Medication 12.5 MILLIGRAM(S): at 23:58

## 2018-10-14 RX ADMIN — Medication 0.6 MILLIGRAM(S): at 05:25

## 2018-10-14 RX ADMIN — Medication 0.6 MILLIGRAM(S): at 17:22

## 2018-10-14 RX ADMIN — ESCITALOPRAM OXALATE 30 MILLIGRAM(S): 10 TABLET, FILM COATED ORAL at 17:22

## 2018-10-14 RX ADMIN — MIRTAZAPINE 15 MILLIGRAM(S): 45 TABLET, ORALLY DISINTEGRATING ORAL at 23:33

## 2018-10-14 RX ADMIN — Medication 12.5 MILLIGRAM(S): at 17:22

## 2018-10-14 NOTE — CHART NOTE - NSCHARTNOTEFT_GEN_A_CORE
Call by RN patient with episode of PAT  's x 7 seconds  after coming from bathroom    VSS Patient with c/o dizziness, no  c/o CP, palpitation or SOB   EKG done - no acute changes noted   Attending notified - Cardiology consult in am   Will continue to monitor   Carlene Gamboa DNP- C  [id number]

## 2018-10-14 NOTE — PROVIDER CONTACT NOTE (OTHER) - ASSESSMENT
Pt was in bathroom, HR went up to 140s, first time it happened for Pt per tele tech
Pt A&Ox4. VSS. Pt was sleeping. Pt was having no c/o chest pain, SOB, palpitations, and discomfort. Pt is on normal saline @ 100ml/hr. Pt is currently sinus bradycardia 50's on tele. Pt is not on any beta blockers. Pt received remeron 7.5mg PO.
Pt A&Ox4. VSS. tmp 97.7, hr 53, bp 114/71, respirations 18 and oxygen saturation 99% on room air. Pt had no symptomatic arrhythmias. Pt was not c/o chest pain, SOB, palpitations, and discomfort. Pt was sleeping. Pt received remeron 7.5mg PO last night.
pt A&Ox4, agitated. denies SOB, CP, pain. refusing assessment, pt refusing CIWA assessment. NAD noted.

## 2018-10-14 NOTE — PROVIDER CONTACT NOTE (OTHER) - RECOMMENDATIONS
NP Gamboa aware, EKG to be done per NP, VSS, no signs of distress, a&ox4, denies N/V/D chest pain
CTM
NP was made aware. Will continue to monitor.
PA was made aware. Will continue to monitor.

## 2018-10-14 NOTE — PROVIDER CONTACT NOTE (OTHER) - ACTION/TREATMENT ORDERED:
EKG done and see by NP, Pt stable at this time, NP to notify MD, no order at this time, cont to monitor
CTM pt instructed to utilize call bell for assistance
No orders were made at this present time.
No orders were made at this present time.

## 2018-10-14 NOTE — PROGRESS NOTE ADULT - SUBJECTIVE AND OBJECTIVE BOX
CHIEF COMPLAINT:Patient is a 63y old  Female who presents with a chief complaint of pericarditis, dizziness (10 Oct 2018 10:30)  no acute events    PAST MEDICAL & SURGICAL HISTORY:  Anxiety  No significant past surgical history          REVIEW OF SYSTEMS:  CONSTITUTIONAL: No fever, weight loss, or fatigue  EYES: No eye pain, visual disturbances, or discharge  NECK: No pain or stiffness  RESPIRATORY: No cough, wheezing, chills or hemoptysis; No Shortness of Breath  CARDIOVASCULAR: No chest pain, palpitations, passing out,  GASTROINTESTINAL: No abdominal or epigastric pain. less nausea, no vomiting, or hematemesis; No diarrhea or constipation. No melena or hematochezia.  GENITOURINARY: No dysuria, frequency, hematuria, or incontinence  NEUROLOGICAL: No headaches, memory loss, loss of strength, still some dizziness  MUSCULOSKELETAL: No joint pain or swelling; No muscle, back, or extremity pain      Medications:  MEDICATIONS  (STANDING):  colchicine 0.6 milliGRAM(s) Oral two times a day  escitalopram 30 milliGRAM(s) Oral daily  heparin  Injectable 5000 Unit(s) SubCutaneous every 12 hours  influenza   Vaccine 0.5 milliLiter(s) IntraMuscular once  mirtazapine 15 milliGRAM(s) Oral at bedtime    MEDICATIONS  (PRN):  meclizine 12.5 milliGRAM(s) Oral every 8 hours PRN Dizziness  ondansetron Injectable 4 milliGRAM(s) IV Push every 6 hours PRN Nausea    	    PHYSICAL EXAM:  T(C): 36.4 (10-14-18 @ 04:00), Max: 36.8 (10-13-18 @ 20:13)  HR: 59 (10-14-18 @ 04:00) (59 - 71)  BP: 125/77 (10-14-18 @ 04:00) (104/71 - 125/77)  RR: 18 (10-14-18 @ 04:00) (18 - 18)  SpO2: 98% (10-14-18 @ 04:00) (97% - 98%)  Wt(kg): --  I&O's Summary    13 Oct 2018 07:01  -  14 Oct 2018 07:00  --------------------------------------------------------  IN: 430 mL / OUT: 600 mL / NET: -170 mL      Appearance: Normal	  HEENT:   Normal oral mucosa, PERRL, EOMI	  Lymphatic: No lymphadenopathy  Cardiovascular: Normal S1 S2, No JVD, No murmurs, No edema  Respiratory: Lungs clear to auscultation	  Psychiatry: A & O x 3,   Gastrointestinal:  Soft, Non-tender, + BS	  Skin: No rashes, No ecchymoses, No cyanosis	  Neurologic: Non-focal from motor equal  + vertiginous symptoms  Extremities: Normal range of motion, No clubbing, cyanosis or edema  Vascular: Peripheral pulses palpable 2+ bilaterally    LABS:	 	    CARDIAC MARKERS:                  proBNP:   Lipid Profile:   HgA1c:   TSH: Thyroid Stimulating Hormone, Serum: 1.61 uIU/mL (10-14 @ 08:55)

## 2018-10-15 LAB
ANION GAP SERPL CALC-SCNC: 12 MMOL/L — SIGNIFICANT CHANGE UP (ref 5–17)
BUN SERPL-MCNC: 22 MG/DL — SIGNIFICANT CHANGE UP (ref 7–23)
CALCIUM SERPL-MCNC: 9.9 MG/DL — SIGNIFICANT CHANGE UP (ref 8.4–10.5)
CHLORIDE SERPL-SCNC: 101 MMOL/L — SIGNIFICANT CHANGE UP (ref 96–108)
CO2 SERPL-SCNC: 27 MMOL/L — SIGNIFICANT CHANGE UP (ref 22–31)
CREAT SERPL-MCNC: 0.94 MG/DL — SIGNIFICANT CHANGE UP (ref 0.5–1.3)
GLUCOSE SERPL-MCNC: 97 MG/DL — SIGNIFICANT CHANGE UP (ref 70–99)
HCT VFR BLD CALC: 46.4 % — HIGH (ref 34.5–45)
HGB BLD-MCNC: 14.8 G/DL — SIGNIFICANT CHANGE UP (ref 11.5–15.5)
MCHC RBC-ENTMCNC: 28.8 PG — SIGNIFICANT CHANGE UP (ref 27–34)
MCHC RBC-ENTMCNC: 31.9 GM/DL — LOW (ref 32–36)
MCV RBC AUTO: 90.4 FL — SIGNIFICANT CHANGE UP (ref 80–100)
PLATELET # BLD AUTO: 349 K/UL — SIGNIFICANT CHANGE UP (ref 150–400)
POTASSIUM SERPL-MCNC: 4.7 MMOL/L — SIGNIFICANT CHANGE UP (ref 3.5–5.3)
POTASSIUM SERPL-SCNC: 4.7 MMOL/L — SIGNIFICANT CHANGE UP (ref 3.5–5.3)
RBC # BLD: 5.13 M/UL — SIGNIFICANT CHANGE UP (ref 3.8–5.2)
RBC # FLD: 14.5 % — SIGNIFICANT CHANGE UP (ref 10.3–14.5)
SODIUM SERPL-SCNC: 140 MMOL/L — SIGNIFICANT CHANGE UP (ref 135–145)
WBC # BLD: 6.98 K/UL — SIGNIFICANT CHANGE UP (ref 3.8–10.5)
WBC # FLD AUTO: 6.98 K/UL — SIGNIFICANT CHANGE UP (ref 3.8–10.5)

## 2018-10-15 RX ADMIN — HEPARIN SODIUM 5000 UNIT(S): 5000 INJECTION INTRAVENOUS; SUBCUTANEOUS at 18:43

## 2018-10-15 RX ADMIN — Medication 0.6 MILLIGRAM(S): at 06:03

## 2018-10-15 RX ADMIN — Medication 12.5 MILLIGRAM(S): at 23:56

## 2018-10-15 RX ADMIN — HEPARIN SODIUM 5000 UNIT(S): 5000 INJECTION INTRAVENOUS; SUBCUTANEOUS at 06:03

## 2018-10-15 RX ADMIN — Medication 0.6 MILLIGRAM(S): at 18:43

## 2018-10-15 RX ADMIN — Medication 12.5 MILLIGRAM(S): at 11:13

## 2018-10-15 RX ADMIN — ESCITALOPRAM OXALATE 30 MILLIGRAM(S): 10 TABLET, FILM COATED ORAL at 14:09

## 2018-10-15 NOTE — PROGRESS NOTE ADULT - SUBJECTIVE AND OBJECTIVE BOX
Subjective: Patient seen and examined. No new events except as noted.   had episode of SVT in bathroom no syncope  chronic constant  dizziness persists      MEDICATIONS:  MEDICATIONS  (STANDING):  colchicine 0.6 milliGRAM(s) Oral two times a day  escitalopram 30 milliGRAM(s) Oral daily  heparin  Injectable 5000 Unit(s) SubCutaneous every 12 hours  influenza   Vaccine 0.5 milliLiter(s) IntraMuscular once  mirtazapine 15 milliGRAM(s) Oral at bedtime      PHYSICAL EXAM:  T(C): 36.4 (10-15-18 @ 04:10), Max: 36.7 (10-14-18 @ 14:26)  HR: 65 (10-15-18 @ 04:10) (65 - 79)  BP: 108/72 (10-15-18 @ 04:10) (98/68 - 122/78)  RR: 18 (10-15-18 @ 04:10) (18 - 18)  SpO2: 98% (10-15-18 @ 04:10) (96% - 98%)  Wt(kg): --  I&O's Summary    14 Oct 2018 07:01  -  15 Oct 2018 07:00  --------------------------------------------------------  IN: 550 mL / OUT: 700 mL / NET: -150 mL          Appearance: Normal	dizzy ea tinnitus  HEENT:   Normal oral mucosa, PERRL, EOMI	  Cardiovascular: Normal S1 S2, No JVD, No murmurs ,  Respiratory: Lungs clear to auscultation, normal effort 	  Ext: No edema  Peripheral pulses palpable 2+ bilaterally      LABS:    CARDIAC MARKERS:                                14.8   6.98  )-----------( 349      ( 15 Oct 2018 07:51 )             46.4     10-15    140  |  101  |  22  ----------------------------<  97  4.7   |  27  |  0.94    Ca    9.9      15 Oct 2018 06:47            TELEMETRY: 	    ECG:  	  RADIOLOGY:   DIAGNOSTIC TESTING:  [ ] Echocardiogram:  [ ]  Catheterization:  [ ] Stress Test:    OTHER:

## 2018-10-15 NOTE — DIETITIAN INITIAL EVALUATION ADULT. - NS AS NUTRI INTERV MEALS SNACK
General/healthful diet/Continue on regular diet as medically feasible and tolerated. Encouraged pt to leave food at bedside for when appetite is good. Obtained food preferences. Will provide pt with chocolate health shake and chocolate pudding with meals. Will suggest adding Ensure Enlive with meals to provide and additional 350kcals, 20g protein per 8oz serving

## 2018-10-15 NOTE — PROGRESS NOTE ADULT - SUBJECTIVE AND OBJECTIVE BOX
SUBJECTIVE: had episode of rapid HR.  now resting.  still dizzy.      Medications:  MEDICATIONS  (STANDING):  ALPRAZolam 0.5 milliGRAM(s) Oral once  colchicine 0.6 milliGRAM(s) Oral two times a day  escitalopram 30 milliGRAM(s) Oral daily  heparin  Injectable 5000 Unit(s) SubCutaneous every 12 hours  influenza   Vaccine 0.5 milliLiter(s) IntraMuscular once  mirtazapine 15 milliGRAM(s) Oral at bedtime    MEDICATIONS  (PRN):  meclizine 12.5 milliGRAM(s) Oral every 8 hours PRN Dizziness  ondansetron Injectable 4 milliGRAM(s) IV Push every 6 hours PRN Nausea      Labs:  CBC Full  -  ( 12 Oct 2018 08:14 )  WBC Count : 7.00 K/uL  Hemoglobin : 13.2 g/dL  Hematocrit : 41.8 %  Platelet Count - Automated : 408 K/uL  Mean Cell Volume : 90.5 fl  Mean Cell Hemoglobin : 28.6 pg  Mean Cell Hemoglobin Concentration : 31.6 gm/dL  Auto Neutrophil # : x  Auto Lymphocyte # : x  Auto Monocyte # : x  Auto Eosinophil # : x  Auto Basophil # : x  Auto Neutrophil % : x  Auto Lymphocyte % : x  Auto Monocyte % : x  Auto Eosinophil % : x  Auto Basophil % : x    10-12    140  |  104  |  16  ----------------------------<  90  4.2   |  28  |  0.89    Ca    9.5      12 Oct 2018 06:45      CAPILLARY BLOOD GLUCOSE                Vitals:  Vital Signs Last 24 Hrs  T(C): 36.4 (12 Oct 2018 08:00), Max: 36.8 (12 Oct 2018 00:21)  T(F): 97.6 (12 Oct 2018 08:00), Max: 98.2 (12 Oct 2018 00:21)  HR: 57 (12 Oct 2018 08:00) (57 - 71)  BP: 124/74 (12 Oct 2018 08:00) (106/63 - 124/74)  BP(mean): --  RR: 18 (12 Oct 2018 08:00) (18 - 18)  SpO2: 96% (12 Oct 2018 08:00) (96% - 97%)        NEUROLOGICAL EXAM:    Mental status: Awake, alert, and in no apparent distress. Oriented x3. Language function is normal.    Cranial Nerves: Pupils were equal, round, reactive to light. Extraocular movements were intact but uncomfortable with eye movements, able to visual bilateral endgaze nystagmus. Facial sensation was intact to light touch. There was no facial asymmetry. The palate was upgoing symmetrically and tongue was midline.     Motor exam: Bulk and tone were normal. Strength was 5/5 in all four extremities. Fine finger movements were symmetric and normal. There was no pronator drift    Reflexes: 2+ in the bilateral upper extremities. 2+ in the bilateral lower extremities. Toes were downgoing bilaterally.     Sensation: Intact to light touch    Coordination: Finger-nose-finger without dysmetria.     Gait: deferred    NIHSS=0.    < from: CT Head No Cont (10.04.18 @ 10:34) >  EXAM:  CT BRAIN                            PROCEDURE DATE:  10/04/2018      INTERPRETATION:  History: Syncope. Headaches. Hit head.    Description: A noncontrast head CT was performed. Axial images were   performed from the skull base to the vertex with coronal/sagittal   reconstructions.    Comparison is made to a brain MRI from 12/04/2017, head CT 10/19/2007.    There is no evidence for acute infarct, acute hemorrhage, mass effect,   calvarial fracture, or hydrocephalus.    Minimal patchy hypodensity without mass effect is noted in the   periventricular white matter which most likely represents chronic   microvascular ischemic changes given the patient's age.    Cerebral volume loss is present with secondary proportional prominence of   the sulci and ventricles.    No lytic or blastic calvarial lesions are noted. The visualized portions   of the paranasal sinuses and mastoid air cells are clear.    IMPRESSION:    No acute intracranial pathology is noted. If the patient has new and   persistent symptoms, consider short interval follow-up head CT or brain   MRI follow-up if there are no MRI contraindications.    JADEN COOK M.D., ATTENDING RADIOLOGIST  This document has been electronically signed. Oct  4 2018 10:40AM

## 2018-10-15 NOTE — PROGRESS NOTE ADULT - SUBJECTIVE AND OBJECTIVE BOX
CHIEF COMPLAINT:Patient is a 63y old  Female who presents with a chief complaint of pericarditis, dizziness (10 Oct 2018 10:30)  no acute events    PAST MEDICAL & SURGICAL HISTORY:  Anxiety  No significant past surgical history          REVIEW OF SYSTEMS:  CONSTITUTIONAL: No fever, weight loss, or fatigue  EYES: No eye pain, visual disturbances, or discharge  NECK: No pain or stiffness  RESPIRATORY: No cough, wheezing, chills or hemoptysis; No Shortness of Breath  CARDIOVASCULAR: No chest pain, palpitations, passing out,  GASTROINTESTINAL: No abdominal or epigastric pain. less nausea, no vomiting, or hematemesis; No diarrhea or constipation. No melena or hematochezia.  GENITOURINARY: No dysuria, frequency, hematuria, or incontinence  NEUROLOGICAL: No headaches, memory loss, loss of strength, still some dizziness  MUSCULOSKELETAL: No joint pain or swelling; No muscle, back, or extremity pain      Medications:  MEDICATIONS  (STANDING):  colchicine 0.6 milliGRAM(s) Oral two times a day  escitalopram 30 milliGRAM(s) Oral daily  heparin  Injectable 5000 Unit(s) SubCutaneous every 12 hours  influenza   Vaccine 0.5 milliLiter(s) IntraMuscular once  mirtazapine 15 milliGRAM(s) Oral at bedtime    MEDICATIONS  (PRN):  meclizine 12.5 milliGRAM(s) Oral every 8 hours PRN Dizziness  ondansetron Injectable 4 milliGRAM(s) IV Push every 6 hours PRN Nausea    	    PHYSICAL EXAM:  T(C): 36.4 (10-15-18 @ 04:10), Max: 36.7 (10-14-18 @ 14:26)  HR: 65 (10-15-18 @ 04:10) (65 - 79)  BP: 108/72 (10-15-18 @ 04:10) (98/68 - 122/78)  RR: 18 (10-15-18 @ 04:10) (18 - 18)  SpO2: 98% (10-15-18 @ 04:10) (96% - 98%)  Wt(kg): --  I&O's Summary    14 Oct 2018 07:01  -  15 Oct 2018 07:00  --------------------------------------------------------  IN: 550 mL / OUT: 700 mL / NET: -150 mL      Appearance: Normal	  HEENT:   Normal oral mucosa, PERRL, EOMI	  Lymphatic: No lymphadenopathy  Cardiovascular: Normal S1 S2, No JVD, No murmurs, No edema  Respiratory: Lungs clear to auscultation	  Psychiatry: A & O x 3,   Gastrointestinal:  Soft, Non-tender, + BS	  Skin: No rashes, No ecchymoses, No cyanosis	  Neurologic: Non-focal from motor equal  + vertiginous symptoms  Extremities: Normal range of motion, No clubbing, cyanosis or edema  Vascular: Peripheral pulses palpable 2+ bilaterally    LABS:	 	    CARDIAC MARKERS:                                14.8   6.98  )-----------( 349      ( 15 Oct 2018 07:51 )             46.4     10-15    140  |  101  |  22  ----------------------------<  97  4.7   |  27  |  0.94    Ca    9.9      15 Oct 2018 06:47      proBNP:   Lipid Profile:   HgA1c:   TSH:

## 2018-10-15 NOTE — PROGRESS NOTE ADULT - REASON FOR ADMISSION
dizziness percardia effusion
diffuse aches and pains hypotension
UTI
Vertigo
hypotension lethargy left flank pain
hypotension possible sepsis
left flank pain diffuse aches and pains
pericarditis, dizziness
UTI
UTI

## 2018-10-15 NOTE — DIETITIAN INITIAL EVALUATION ADULT. - ORAL INTAKE PTA
good/24hr typical dietary recall obtained. Pt seems to eat very well. NKFA. Reports to take Carnitine, Biotene, Turmeric, and Vitamin D at home.

## 2018-10-15 NOTE — CHART NOTE - NSCHARTNOTEFT_GEN_A_CORE
Upon Nutritional Assessment by the Registered Dietitian your patient was determined to meet criteria / has evidence of the following diagnosis/diagnoses:          [ ]  Mild Protein Calorie Malnutrition        [ ]  Moderate Protein Calorie Malnutrition        [ ] Severe Protein Calorie Malnutrition        [ ] Unspecified Protein Calorie Malnutrition        [X] Underweight / BMI <19        [ ] Morbid Obesity / BMI > 40      Findings as based on:  [X] Comprehensive nutrition assessment   [X] Nutrition Focused Physical Exam  [ ] Other:       Nutrition Plan/Recommendations:  Continue on regular diet as medically feasible and tolerated. Encouraged pt to leave food at bedside for when appetite is good. Obtained food preferences. Will provide pt with chocolate health shake and chocolate pudding with meals. Will suggest adding Ensure Enlive with meals to provide and additional 350kcals, 20g protein per 8oz serving      PROVIDER Section:     By signing this assessment you are acknowledging and agree with the diagnosis/diagnoses assigned by the Registered Dietitian    Comments:

## 2018-10-15 NOTE — DIETITIAN INITIAL EVALUATION ADULT. - ENERGY NEEDS
Ht: 5'10" Wt: 119lbs BMI: 17.1kg/m2 IBW: 150lbs(+/-10%) 79%IBW  No edema noted per nursing flow sheets.   No pressure related injuries recorded per nursing flow sheets.

## 2018-10-16 RX ADMIN — Medication 12.5 MILLIGRAM(S): at 13:44

## 2018-10-16 RX ADMIN — Medication 0.6 MILLIGRAM(S): at 06:17

## 2018-10-16 RX ADMIN — HEPARIN SODIUM 5000 UNIT(S): 5000 INJECTION INTRAVENOUS; SUBCUTANEOUS at 06:17

## 2018-10-16 RX ADMIN — MIRTAZAPINE 15 MILLIGRAM(S): 45 TABLET, ORALLY DISINTEGRATING ORAL at 00:25

## 2018-10-16 RX ADMIN — HEPARIN SODIUM 5000 UNIT(S): 5000 INJECTION INTRAVENOUS; SUBCUTANEOUS at 17:42

## 2018-10-16 RX ADMIN — ESCITALOPRAM OXALATE 30 MILLIGRAM(S): 10 TABLET, FILM COATED ORAL at 13:43

## 2018-10-16 RX ADMIN — Medication 0.6 MILLIGRAM(S): at 17:42

## 2018-10-17 LAB
ANION GAP SERPL CALC-SCNC: 13 MMOL/L — SIGNIFICANT CHANGE UP (ref 5–17)
BUN SERPL-MCNC: 19 MG/DL — SIGNIFICANT CHANGE UP (ref 7–23)
CALCIUM SERPL-MCNC: 10 MG/DL — SIGNIFICANT CHANGE UP (ref 8.4–10.5)
CHLORIDE SERPL-SCNC: 99 MMOL/L — SIGNIFICANT CHANGE UP (ref 96–108)
CO2 SERPL-SCNC: 27 MMOL/L — SIGNIFICANT CHANGE UP (ref 22–31)
CREAT SERPL-MCNC: 0.88 MG/DL — SIGNIFICANT CHANGE UP (ref 0.5–1.3)
GLUCOSE SERPL-MCNC: 89 MG/DL — SIGNIFICANT CHANGE UP (ref 70–99)
HCT VFR BLD CALC: 44.8 % — SIGNIFICANT CHANGE UP (ref 34.5–45)
HGB BLD-MCNC: 14.5 G/DL — SIGNIFICANT CHANGE UP (ref 11.5–15.5)
MCHC RBC-ENTMCNC: 29.1 PG — SIGNIFICANT CHANGE UP (ref 27–34)
MCHC RBC-ENTMCNC: 32.4 GM/DL — SIGNIFICANT CHANGE UP (ref 32–36)
MCV RBC AUTO: 90 FL — SIGNIFICANT CHANGE UP (ref 80–100)
PLATELET # BLD AUTO: 287 K/UL — SIGNIFICANT CHANGE UP (ref 150–400)
POTASSIUM SERPL-MCNC: 4.4 MMOL/L — SIGNIFICANT CHANGE UP (ref 3.5–5.3)
POTASSIUM SERPL-SCNC: 4.4 MMOL/L — SIGNIFICANT CHANGE UP (ref 3.5–5.3)
RBC # BLD: 4.98 M/UL — SIGNIFICANT CHANGE UP (ref 3.8–5.2)
RBC # FLD: 14.4 % — SIGNIFICANT CHANGE UP (ref 10.3–14.5)
SODIUM SERPL-SCNC: 139 MMOL/L — SIGNIFICANT CHANGE UP (ref 135–145)
WBC # BLD: 6.16 K/UL — SIGNIFICANT CHANGE UP (ref 3.8–10.5)
WBC # FLD AUTO: 6.16 K/UL — SIGNIFICANT CHANGE UP (ref 3.8–10.5)

## 2018-10-17 PROCEDURE — 93321 DOPPLER ECHO F-UP/LMTD STD: CPT | Mod: 26

## 2018-10-17 PROCEDURE — 93308 TTE F-UP OR LMTD: CPT | Mod: 26

## 2018-10-17 PROCEDURE — 99222 1ST HOSP IP/OBS MODERATE 55: CPT | Mod: GC

## 2018-10-17 RX ORDER — LANOLIN ALCOHOL/MO/W.PET/CERES
3 CREAM (GRAM) TOPICAL ONCE
Qty: 0 | Refills: 0 | Status: COMPLETED | OUTPATIENT
Start: 2018-10-17 | End: 2018-10-18

## 2018-10-17 RX ADMIN — Medication 0.6 MILLIGRAM(S): at 16:41

## 2018-10-17 RX ADMIN — ESCITALOPRAM OXALATE 30 MILLIGRAM(S): 10 TABLET, FILM COATED ORAL at 16:41

## 2018-10-17 RX ADMIN — Medication 12.5 MILLIGRAM(S): at 00:11

## 2018-10-17 RX ADMIN — Medication 0.6 MILLIGRAM(S): at 05:50

## 2018-10-17 RX ADMIN — MIRTAZAPINE 15 MILLIGRAM(S): 45 TABLET, ORALLY DISINTEGRATING ORAL at 00:11

## 2018-10-17 RX ADMIN — HEPARIN SODIUM 5000 UNIT(S): 5000 INJECTION INTRAVENOUS; SUBCUTANEOUS at 16:41

## 2018-10-17 RX ADMIN — HEPARIN SODIUM 5000 UNIT(S): 5000 INJECTION INTRAVENOUS; SUBCUTANEOUS at 05:50

## 2018-10-17 NOTE — CONSULT NOTE ADULT - ATTENDING COMMENTS
Seen and examined with resident. Agree with note.   Patient with gait dysfunction.  Patient will need subacute rehabilitation when stable.

## 2018-10-17 NOTE — CONSULT NOTE ADULT - CONSULT REQUESTED DATE/TIME
04-Oct-2018 18:35
04-Oct-2018 09:42
04-Oct-2018 13:15
05-Oct-2018 17:38
05-Oct-2018 19:29
09-Oct-2018
17-Oct-2018 11:10

## 2018-10-17 NOTE — PROGRESS NOTE ADULT - SUBJECTIVE AND OBJECTIVE BOX
CHIEF COMPLAINT:Patient is a 63y old  Female who presents with a chief complaint of dizziness percardia effusion (15 Oct 2018 10:23)    	        PAST MEDICAL & SURGICAL HISTORY:  Anxiety  No significant past surgical history          REVIEW OF SYSTEMS:  CONSTITUTIONAL: No fever, weight loss, or fatigue  EYES: No eye pain, visual disturbances, or discharge  NECK: No pain or stiffness  RESPIRATORY: No cough, wheezing, chills or hemoptysis; No Shortness of Breath  CARDIOVASCULAR: No chest pain, palpitations, passing out  GASTROINTESTINAL: No abdominal or epigastric pain. No nausea, vomiting, or hematemesis; No diarrhea or constipation. No melena or hematochezia.  GENITOURINARY: No dysuria, frequency, hematuria, or incontinence  NEUROLOGICAL: No headaches, memory loss, loss of strength, numbness, or tremors  dizziness and vertigo no better  MUSCULOSKELETAL: No joint pain or swelling; No muscle, back, or extremity pain    Medications:  MEDICATIONS  (STANDING):  colchicine 0.6 milliGRAM(s) Oral two times a day  escitalopram 30 milliGRAM(s) Oral daily  heparin  Injectable 5000 Unit(s) SubCutaneous every 12 hours  influenza   Vaccine 0.5 milliLiter(s) IntraMuscular once  mirtazapine 15 milliGRAM(s) Oral at bedtime    MEDICATIONS  (PRN):  meclizine 12.5 milliGRAM(s) Oral every 8 hours PRN Dizziness  ondansetron Injectable 4 milliGRAM(s) IV Push every 6 hours PRN Nausea    	    PHYSICAL EXAM:  T(C): 36.7 (10-17-18 @ 04:00), Max: 36.8 (10-16-18 @ 12:34)  HR: 73 (10-17-18 @ 04:00) (73 - 88)  BP: 115/60 (10-17-18 @ 04:00) (102/70 - 142/83)  RR: 18 (10-17-18 @ 04:00) (18 - 19)  SpO2: 98% (10-17-18 @ 04:00) (95% - 98%)  Wt(kg): --  I&O's Summary    16 Oct 2018 07:01  -  17 Oct 2018 07:00  --------------------------------------------------------  IN: 460 mL / OUT: 300 mL / NET: 160 mL        Appearance: Normal	  HEENT:   Normal oral mucosa, PERRL, EOMI	  Lymphatic: No lymphadenopathy  Cardiovascular: Normal S1 S2, No JVD, No murmurs, No edema  Respiratory: Lungs clear to auscultation	  Psychiatry: A & O x 3,   Gastrointestinal:  Soft, Non-tender, + BS	  Skin: No rashes, No ecchymoses, No cyanosis	  Neurologic: Non-focal unable to fully assess   Extremities: Normal range of motion, No clubbing, cyanosis or edema  Vascular: Peripheral pulses palpable 2+ bilaterally    TELEMETRY: 	    ECG:  	  RADIOLOGY:  OTHER: 	  	  LABS:	 	    CARDIAC MARKERS:                                14.5   6.16  )-----------( 287      ( 17 Oct 2018 09:01 )             44.8     10-17    139  |  99  |  19  ----------------------------<  89  4.4   |  27  |  0.88    Ca    10.0      17 Oct 2018 07:10      proBNP:   Lipid Profile:   HgA1c:   TSH:

## 2018-10-17 NOTE — CONSULT NOTE ADULT - SUBJECTIVE AND OBJECTIVE BOX
HPI:  63 F pmhx depression, anxiety, PTSD presented to ED 10/3 with complaints of body aches, sweating, chills and syncope on the day prior to admission.  Patient had her blood pressure taken and was found to be profoundly hypotensive.  Imaging suggestive of pyelonephritis and patient was treated empirically with ceftriaxone.  CT head was negative. Patient has had bouts of vertigo in the past. Small pericardial effusion on CT with no evidence of tamponade.        REVIEW OF SYSTEMS  Constitutional - No fever, No weight loss, No fatigue  HEENT - No eye pain, No visual disturbances, No difficulty hearing, No tinnitus, No vertigo, No neck pain  Respiratory - No cough, No wheezing, No shortness of breath  Cardiovascular - No chest pain, No palpitations  Gastrointestinal - No abdominal pain, No nausea, No vomiting, No diarrhea, No constipation  Genitourinary - No dysuria, No frequency, No hematuria, No incontinence  Neurological - No headaches, No memory loss, No loss of strength, No numbness, No tremors  Skin - No itching, No rashes, No lesions   Endocrine - No temperature intolerance  Musculoskeletal - No joint pain, No joint swelling, No muscle pain  Psychiatric - No depression, No anxiety    PAST MEDICAL & SURGICAL HISTORY  Anxiety  No significant past surgical history      SOCIAL HISTORY  Smoking - Denied  EtOH - Denied   Drugs - Denied    FUNCTIONAL HISTORY  Patient lives alone in a private house, 1 step to enter and is independent in ADLs and ambulation. +driving, self-employed,    CURRENT FUNCTIONAL STATUS  Bed mobility: Independent  Transfers: Independent  Ambulation: Contact guard, 15 feet x 2    FAMILY HISTORY       RECENT LABS/IMAGING    xray L hand: Avulsion fracture at the fifth distal phalangeal base with swan-neck deformity of the fifth the IP.   CTH: No acute intracranial pathology is noted.   CT abd/pelvis: Small pericardial effusion, slightly increased since 10/13/2017. Heterogeneous enhancement of the kidneys bilaterally. In addition, there  is mild enhancement of the urothelium in the proximal right ureter.   TTE large pericard effusion    CBC Full  -  ( 17 Oct 2018 09:01 )  WBC Count : 6.16 K/uL  Hemoglobin : 14.5 g/dL  Hematocrit : 44.8 %  Platelet Count - Automated : 287 K/uL  Mean Cell Volume : 90.0 fl  Mean Cell Hemoglobin : 29.1 pg  Mean Cell Hemoglobin Concentration : 32.4 gm/dL  Auto Neutrophil # : x  Auto Lymphocyte # : x  Auto Monocyte # : x  Auto Eosinophil # : x  Auto Basophil # : x  Auto Neutrophil % : x  Auto Lymphocyte % : x  Auto Monocyte % : x  Auto Eosinophil % : x  Auto Basophil % : x    10-17    139  |  99  |  19  ----------------------------<  89  4.4   |  27  |  0.88    Ca    10.0      17 Oct 2018 07:10          VITALS  T(C): 36.7 (10-17-18 @ 04:00), Max: 36.8 (10-16-18 @ 12:34)  HR: 73 (10-17-18 @ 04:00) (73 - 88)  BP: 115/60 (10-17-18 @ 04:00) (102/70 - 142/83)  RR: 18 (10-17-18 @ 04:00) (18 - 19)  SpO2: 98% (10-17-18 @ 04:00) (95% - 98%)  Wt(kg): --    ALLERGIES  e-mycin,tetracycline,other antibiotic (Rash)  erythromycin (Rash)      MEDICATIONS   colchicine 0.6 milliGRAM(s) Oral two times a day  escitalopram 30 milliGRAM(s) Oral daily  heparin  Injectable 5000 Unit(s) SubCutaneous every 12 hours  influenza   Vaccine 0.5 milliLiter(s) IntraMuscular once  meclizine 12.5 milliGRAM(s) Oral every 8 hours PRN  mirtazapine 15 milliGRAM(s) Oral at bedtime  ondansetron Injectable 4 milliGRAM(s) IV Push every 6 hours PRN      ----------------------------------------------------------------------------------------  PHYSICAL EXAM  Constitutional - NAD, Comfortable  HEENT - NCAT, EOMI  Neck - Supple, No limited ROM  Chest - CTA bilaterally, No wheeze, No rhonchi, No crackles  Cardiovascular - RRR, S1S2, No murmurs  Abdomen - BS+, Soft, NTND  Extremities - No C/C/E, No calf tenderness   Neurologic Exam -                    Cognitive - Awake, Alert, AAO to self, place, date, year, situation     Communication - Fluent, No dysarthria     Cranial Nerves - CN 2-12 intact     Motor - No focal deficits                    LEFT    UE - ShAB 5/5, EF 5/5, EE 5/5, WE 5/5,  5/5                    RIGHT UE - ShAB 5/5, EF 5/5, EE 5/5, WE 5/5,  5/5                    LEFT    LE - HF 5/5, KE 5/5, DF 5/5, PF 5/5                    RIGHT LE - HF 5/5, KE 5/5, DF 5/5, PF 5/5        Sensory - Intact to LT     Reflexes - DTR Intact, No primitive reflexive     Coordination - FTN intact     OculoVestibular - No saccades, No nystagmus, VOR         Balance - WNL Static  Psychiatric - Mood stable, Affect WNL  ----------------------------------------------------------------------------------------  ASSESSMENT/PLAN HPI:  63 F pmhx depression, anxiety, PTSD presented to ED 10/3 with complaints of body aches, sweating, chills and syncope on the day prior to admission.  Patient had her blood pressure taken and was found to be profoundly hypotensive.  Imaging suggestive of pyelonephritis and patient was treated empirically with ceftriaxone.  CT head was negative. Patient has had bouts of vertigo in the past. Small pericardial effusion on CT with no evidence of tamponade.  On current encounter, patient states she has had persistent dizziness since hitting her head after fall in the ER.  She also complains of mild SOB and nausea.      REVIEW OF SYSTEMS  Constitutional - No fever, No weight loss, + fatigue  HEENT - No eye pain, No visual disturbances, No difficulty hearing, No tinnitus, No vertigo, + neck pain  Respiratory - No cough, No wheezing, + shortness of breath  Cardiovascular - No chest pain, No palpitations  Gastrointestinal - No abdominal pain, + nausea, No vomiting, No diarrhea, No constipation  Genitourinary - No dysuria, No frequency, No hematuria, No incontinence  Neurological - No headaches, + memory loss, No loss of strength, No numbness, No tremors  Skin - No itching, No rashes, No lesions   Endocrine - No temperature intolerance  Musculoskeletal - No joint pain, No joint swelling, No muscle pain  Psychiatric - No depression, + anxiety    PAST MEDICAL & SURGICAL HISTORY  Anxiety  No significant past surgical history      FUNCTIONAL HISTORY  Patient lives alone in a private house, 1 step to enter and is independent in ADLs and ambulation. +driving, self-employed,    CURRENT FUNCTIONAL STATUS  Bed mobility: Independent  Transfers: Independent  Ambulation: Contact guard, 15 feet x 2      RECENT LABS/IMAGING    xray L hand: Avulsion fracture at the fifth distal phalangeal base with swan-neck deformity of the fifth the IP.     CTH: No acute intracranial pathology is noted.     CT abd/pelvis: Small pericardial effusion, slightly increased since 10/13/2017. Heterogeneous enhancement of the kidneys bilaterally. In addition, there  is mild enhancement of the urothelium in the proximal right ureter.     TTE large pericard effusion    CBC Full  -  ( 17 Oct 2018 09:01 )  WBC Count : 6.16 K/uL  Hemoglobin : 14.5 g/dL  Hematocrit : 44.8 %  Platelet Count - Automated : 287 K/uL  Mean Cell Volume : 90.0 fl  Mean Cell Hemoglobin : 29.1 pg  Mean Cell Hemoglobin Concentration : 32.4 gm/dL      10-17    139  |  99  |  19  ----------------------------<  89  4.4   |  27  |  0.88    Ca    10.0      17 Oct 2018 07:10          VITALS  T(C): 36.7 (10-17-18 @ 04:00), Max: 36.8 (10-16-18 @ 12:34)  HR: 73 (10-17-18 @ 04:00) (73 - 88)  BP: 115/60 (10-17-18 @ 04:00) (102/70 - 142/83)  RR: 18 (10-17-18 @ 04:00) (18 - 19)  SpO2: 98% (10-17-18 @ 04:00) (95% - 98%)      ALLERGIES  e-mycin, tetracycline, other antibiotic (Rash)  erythromycin (Rash)      MEDICATIONS   colchicine 0.6 milliGRAM(s) Oral two times a day  escitalopram 30 milliGRAM(s) Oral daily  heparin  Injectable 5000 Unit(s) SubCutaneous every 12 hours  influenza   Vaccine 0.5 milliLiter(s) IntraMuscular once  meclizine 12.5 milliGRAM(s) Oral every 8 hours PRN  mirtazapine 15 milliGRAM(s) Oral at bedtime  ondansetron Injectable 4 milliGRAM(s) IV Push every 6 hours PRN      ----------------------------------------------------------------------------------------  PHYSICAL EXAM  Constitutional - NAD, Comfortable  HEENT - NCAT, EOMI  Neck - Supple, No limited ROM  Chest - CTA bilaterally, No wheeze, No rhonchi, No crackles  Cardiovascular - RRR, S1S2, No murmurs  Abdomen - BS+, Soft, NTND  Extremities - No C/C/E, No calf tenderness   Neurologic Exam                     Cognitive - Awake, Alert, AAO to self, place, date, year, situation     Communication - Fluent, No dysarthria     Cranial Nerves - CN 2-12 intact     Motor - No focal deficits, no pronator drift,                     LEFT    UE - ShAB 5/5, EF 5/5, EE 5/5, WE 5/5,  5/5                    RIGHT UE - ShAB 5/5, EF 5/5, EE 5/5, WE 5/5,  5/5                    LEFT    LE - HF 5/5, KE 5/5, DF 5/5, PF 5/5                    RIGHT LE - HF 5/5, KE 5/5, DF 5/5, PF 5/5        Sensory - Intact to LT     Reflexes - DTR Intact, No primitive reflexive     Coordination - FTN intact, fair bilaterally     OculoVestibular - No saccades, No nystagmus,         ----------------------------------------------------------------------------------------  ASSESSMENT/PLAN    63 F admitted with pyelonephritis, pericardial effusion, persistent dizziness with gait disorder, balance impairment    PT - Ambulation, transfers  Precautions - Fall  DVT PPX - heparin as per primary team  Disposition - Pending HPI:  63 F pmhx depression, anxiety, PTSD presented to ED 10/3 with complaints of body aches, sweating, chills and syncope on the day prior to admission.  Patient had her blood pressure taken and was found to be profoundly hypotensive.  Imaging suggestive of pyelonephritis and patient was treated empirically with ceftriaxone.  CT head was negative. Patient has had bouts of vertigo in the past. Small pericardial effusion on CT with no evidence of tamponade.  On current encounter, patient states she has had persistent dizziness since hitting her head after fall in the ER.  She also complains of mild SOB and nausea.      REVIEW OF SYSTEMS  Constitutional - No fever, No weight loss, + fatigue  HEENT - No eye pain, No visual disturbances, No difficulty hearing, No tinnitus, No vertigo, + neck pain  Respiratory - No cough, No wheezing, + shortness of breath  Cardiovascular - No chest pain, No palpitations  Gastrointestinal - No abdominal pain, + nausea, No vomiting, No diarrhea, No constipation  Genitourinary - No dysuria, No frequency, No hematuria, No incontinence  Neurological - No headaches, + memory loss, No loss of strength, No numbness, No tremors  Skin - No itching, No rashes, No lesions   Endocrine - No temperature intolerance  Musculoskeletal - No joint pain, No joint swelling, No muscle pain  Psychiatric - No depression, + anxiety    PAST MEDICAL & SURGICAL HISTORY  Anxiety  No significant past surgical history      FUNCTIONAL HISTORY  Patient lives alone in a private house, 1 step to enter and is independent in ADLs and ambulation. +driving, self-employed,    CURRENT FUNCTIONAL STATUS  Bed mobility: Independent  Transfers: Independent  Ambulation: Contact guard, 15 feet x 2      RECENT LABS/IMAGING    xray L hand: Avulsion fracture at the fifth distal phalangeal base with swan-neck deformity of the fifth the IP.     CTH: No acute intracranial pathology is noted.     CT abd/pelvis: Small pericardial effusion, slightly increased since 10/13/2017. Heterogeneous enhancement of the kidneys bilaterally. In addition, there  is mild enhancement of the urothelium in the proximal right ureter.     TTE large pericard effusion    CBC Full  -  ( 17 Oct 2018 09:01 )  WBC Count : 6.16 K/uL  Hemoglobin : 14.5 g/dL  Hematocrit : 44.8 %  Platelet Count - Automated : 287 K/uL  Mean Cell Volume : 90.0 fl  Mean Cell Hemoglobin : 29.1 pg  Mean Cell Hemoglobin Concentration : 32.4 gm/dL      10-17    139  |  99  |  19  ----------------------------<  89  4.4   |  27  |  0.88    Ca    10.0      17 Oct 2018 07:10          VITALS  T(C): 36.7 (10-17-18 @ 04:00), Max: 36.8 (10-16-18 @ 12:34)  HR: 73 (10-17-18 @ 04:00) (73 - 88)  BP: 115/60 (10-17-18 @ 04:00) (102/70 - 142/83)  RR: 18 (10-17-18 @ 04:00) (18 - 19)  SpO2: 98% (10-17-18 @ 04:00) (95% - 98%)      ALLERGIES  e-mycin, tetracycline, other antibiotic (Rash)  erythromycin (Rash)      MEDICATIONS   colchicine 0.6 milliGRAM(s) Oral two times a day  escitalopram 30 milliGRAM(s) Oral daily  heparin  Injectable 5000 Unit(s) SubCutaneous every 12 hours  influenza   Vaccine 0.5 milliLiter(s) IntraMuscular once  meclizine 12.5 milliGRAM(s) Oral every 8 hours PRN  mirtazapine 15 milliGRAM(s) Oral at bedtime  ondansetron Injectable 4 milliGRAM(s) IV Push every 6 hours PRN      ----------------------------------------------------------------------------------------  PHYSICAL EXAM  Constitutional - NAD, Comfortable  HEENT - NCAT, EOMI  Neck - Supple, No limited ROM  Chest - CTA bilaterally, No wheeze, No rhonchi, No crackles  Cardiovascular - RRR, S1S2, No murmurs  Abdomen - BS+, Soft, NTND  Extremities - No C/C/E, No calf tenderness   Neurologic Exam                     Cognitive - Awake, Alert, AAO to self, place, date, year, situation     Communication - Fluent, No dysarthria     Cranial Nerves - CN 2-12 intact     Motor - no pronator drift,                     LEFT    UE - ShAB 5/5, EF 5/5, EE 5/5, WE 5/5,  5/5                    RIGHT UE - ShAB 5/5, EF 5/5, EE 5/5, WE 5/5,  5/5                    LEFT    LE - HF 5/5, KE 5/5, DF 5/5, PF 5/5                    RIGHT LE - HF 5/5, KE 5/5, DF 5/5, PF 5/5        Sensory - Intact to LT     Reflexes - DTR Intact, No primitive reflexive     Coordination - FTN intact, fair bilaterally     OculoVestibular - No saccades, No nystagmus,         ----------------------------------------------------------------------------------------  ASSESSMENT/PLAN    63 F admitted with pyelonephritis, pericardial effusion, persistent dizziness with gait disorder, balance impairment    PT - Ambulation, transfers  Precautions - Fall  DVT PPX - heparin as per primary team  Disposition - Subacute rehab HPI:  63 F pmhx depression, anxiety, PTSD presented to ED 10/3 with complaints of body aches, sweating, chills and syncope on the day prior to admission.  Patient had her blood pressure taken and was found to be profoundly hypotensive.  Imaging suggestive of pyelonephritis and patient was treated empirically with ceftriaxone.  CT head was negative. Patient has had bouts of vertigo in the past. Small pericardial effusion on CT with no evidence of tamponade.  On current encounter, patient states she has had persistent dizziness since hitting her head after fall in the ER.  She also complains of mild SOB and nausea.      REVIEW OF SYSTEMS  Constitutional - No fever, No weight loss, + fatigue  HEENT - No eye pain, No visual disturbances, No difficulty hearing, No tinnitus, No vertigo, + neck pain  Respiratory - No cough, No wheezing, + shortness of breath  Cardiovascular - No chest pain, No palpitations  Gastrointestinal - No abdominal pain, + nausea, No vomiting, No diarrhea, No constipation  Genitourinary - No dysuria, No frequency, No hematuria, No incontinence  Neurological - No headaches, + memory loss, No loss of strength, No numbness, No tremors  Skin - No itching, No rashes, No lesions   Endocrine - No temperature intolerance  Musculoskeletal - No joint pain, No joint swelling, No muscle pain  Psychiatric - No depression, + anxiety    PAST MEDICAL & SURGICAL HISTORY  Anxiety  No significant past surgical history    FUNCTIONAL HISTORY  Patient lives alone in a private house, 1 step to enter and is independent in ADLs and ambulation. +driving, self-employed,    CURRENT FUNCTIONAL STATUS  Bed mobility: Independent  Transfers: Independent  Ambulation: Contact guard, 15 feet x 2      RECENT LABS/IMAGING    xray L hand: Avulsion fracture at the fifth distal phalangeal base with swan-neck deformity of the fifth the IP.     CTH: No acute intracranial pathology is noted.     CT abd/pelvis: Small pericardial effusion, slightly increased since 10/13/2017. Heterogeneous enhancement of the kidneys bilaterally. In addition, there  is mild enhancement of the urothelium in the proximal right ureter.     TTE large pericard effusion    CBC Full  -  ( 17 Oct 2018 09:01 )  WBC Count : 6.16 K/uL  Hemoglobin : 14.5 g/dL  Hematocrit : 44.8 %  Platelet Count - Automated : 287 K/uL  Mean Cell Volume : 90.0 fl  Mean Cell Hemoglobin : 29.1 pg  Mean Cell Hemoglobin Concentration : 32.4 gm/dL      10-17    139  |  99  |  19  ----------------------------<  89  4.4   |  27  |  0.88    Ca    10.0      17 Oct 2018 07:10          VITALS  T(C): 36.7 (10-17-18 @ 04:00), Max: 36.8 (10-16-18 @ 12:34)  HR: 73 (10-17-18 @ 04:00) (73 - 88)  BP: 115/60 (10-17-18 @ 04:00) (102/70 - 142/83)  RR: 18 (10-17-18 @ 04:00) (18 - 19)  SpO2: 98% (10-17-18 @ 04:00) (95% - 98%)      ALLERGIES  e-mycin, tetracycline, other antibiotic (Rash)  erythromycin (Rash)      MEDICATIONS   colchicine 0.6 milliGRAM(s) Oral two times a day  escitalopram 30 milliGRAM(s) Oral daily  heparin  Injectable 5000 Unit(s) SubCutaneous every 12 hours  influenza   Vaccine 0.5 milliLiter(s) IntraMuscular once  meclizine 12.5 milliGRAM(s) Oral every 8 hours PRN  mirtazapine 15 milliGRAM(s) Oral at bedtime  ondansetron Injectable 4 milliGRAM(s) IV Push every 6 hours PRN      ----------------------------------------------------------------------------------------  PHYSICAL EXAM  Constitutional - NAD, Comfortable  HEENT - NCAT, EOMI  Neck - Supple, No limited ROM  Chest - CTA bilaterally, No wheeze, No rhonchi, No crackles  Cardiovascular - RRR, S1S2, No murmurs  Abdomen - BS+, Soft, NTND  Extremities - No C/C/E, No calf tenderness   Neurologic Exam                     Cognitive - Awake, Alert, AAO to self, place, date, year, situation     Communication - Fluent, No dysarthria     Cranial Nerves - CN 2-12 intact     Motor - no pronator drift,                     LEFT    UE - ShAB 5/5, EF 5/5, EE 5/5, WE 5/5,  5/5                    RIGHT UE - ShAB 5/5, EF 5/5, EE 5/5, WE 5/5,  5/5                    LEFT    LE - HF 5/5, KE 5/5, DF 5/5, PF 5/5                    RIGHT LE - HF 5/5, KE 5/5, DF 5/5, PF 5/5        Sensory - Intact to LT     Reflexes - DTR Intact, No primitive reflexive     Coordination - FTN intact, fair bilaterally     OculoVestibular - No saccades, No nystagmus,         ----------------------------------------------------------------------------------------  ASSESSMENT/PLAN    63 F admitted with pyelonephritis, pericardial effusion, persistent dizziness with gait disorder, balance impairment    PT - Ambulation, transfers  OT- ADLs  Precautions - Fall  DVT PPX - heparin as per primary team  Disposition - Subacute rehab

## 2018-10-18 RX ORDER — LANOLIN ALCOHOL/MO/W.PET/CERES
3 CREAM (GRAM) TOPICAL ONCE
Qty: 0 | Refills: 0 | Status: COMPLETED | OUTPATIENT
Start: 2018-10-18 | End: 2018-10-19

## 2018-10-18 RX ADMIN — MIRTAZAPINE 15 MILLIGRAM(S): 45 TABLET, ORALLY DISINTEGRATING ORAL at 00:34

## 2018-10-18 RX ADMIN — HEPARIN SODIUM 5000 UNIT(S): 5000 INJECTION INTRAVENOUS; SUBCUTANEOUS at 05:42

## 2018-10-18 RX ADMIN — ESCITALOPRAM OXALATE 30 MILLIGRAM(S): 10 TABLET, FILM COATED ORAL at 13:38

## 2018-10-18 RX ADMIN — Medication 0.6 MILLIGRAM(S): at 05:42

## 2018-10-18 RX ADMIN — Medication 12.5 MILLIGRAM(S): at 00:34

## 2018-10-18 RX ADMIN — Medication 12.5 MILLIGRAM(S): at 13:38

## 2018-10-18 RX ADMIN — Medication 3 MILLIGRAM(S): at 00:34

## 2018-10-18 RX ADMIN — Medication 0.6 MILLIGRAM(S): at 18:32

## 2018-10-18 RX ADMIN — HEPARIN SODIUM 5000 UNIT(S): 5000 INJECTION INTRAVENOUS; SUBCUTANEOUS at 18:32

## 2018-10-18 NOTE — PROGRESS NOTE ADULT - SUBJECTIVE AND OBJECTIVE BOX
CHIEF COMPLAINT:Patient is a 63y old  Female who presents with a chief complaint of dizziness percardia effusion (15 Oct 2018 10:23)  no acute events    PAST MEDICAL & SURGICAL HISTORY:  Anxiety  No significant past surgical history          REVIEW OF SYSTEMS:  CONSTITUTIONAL: No fever, weight loss, or fatigue  EYES: No eye pain, visual disturbances, or discharge  NECK: No pain or stiffness  RESPIRATORY: No cough, wheezing, chills or hemoptysis; No Shortness of Breath  CARDIOVASCULAR: No chest pain, palpitations, passing out  GASTROINTESTINAL: No abdominal or epigastric pain. No nausea, vomiting, or hematemesis; No diarrhea or constipation. No melena or hematochezia.  GENITOURINARY: No dysuria, frequency, hematuria, or incontinence  NEUROLOGICAL: No headaches, memory loss, loss of strength, numbness, or tremors  dizziness and vertigo no better  MUSCULOSKELETAL: No joint pain or swelling; No muscle, back, or extremity pain      Medications:  MEDICATIONS  (STANDING):  colchicine 0.6 milliGRAM(s) Oral two times a day  escitalopram 30 milliGRAM(s) Oral daily  heparin  Injectable 5000 Unit(s) SubCutaneous every 12 hours  influenza   Vaccine 0.5 milliLiter(s) IntraMuscular once  mirtazapine 15 milliGRAM(s) Oral at bedtime    MEDICATIONS  (PRN):  meclizine 12.5 milliGRAM(s) Oral every 8 hours PRN Dizziness  ondansetron Injectable 4 milliGRAM(s) IV Push every 6 hours PRN Nausea    	    PHYSICAL EXAM:  T(C): 36.6 (10-18-18 @ 04:01), Max: 36.7 (10-17-18 @ 20:17)  HR: 69 (10-18-18 @ 04:01) (69 - 87)  BP: 108/69 (10-18-18 @ 04:01) (108/69 - 120/75)  RR: 18 (10-18-18 @ 04:01) (18 - 18)  SpO2: 97% (10-18-18 @ 04:01) (97% - 98%)  Wt(kg): --  I&O's Summary    17 Oct 2018 07:01  -  18 Oct 2018 07:00  --------------------------------------------------------  IN: 200 mL / OUT: 0 mL / NET: 200 mL      Appearance: Normal	  HEENT:   Normal oral mucosa, PERRL, EOMI	  Lymphatic: No lymphadenopathy  Cardiovascular: Normal S1 S2, No JVD, No murmurs, No edema  Respiratory: Lungs clear to auscultation	  Psychiatry: A & O x 3,   Gastrointestinal:  Soft, Non-tender, + BS	  Skin: No rashes, No ecchymoses, No cyanosis	  Neurologic: Non-focal unable to fully assess   Extremities: Normal range of motion, No clubbing, cyanosis or edema  Vascular: Peripheral pulses palpable 2+ bilaterally    LABS:	 	    CARDIAC MARKERS:                                14.5   6.16  )-----------( 287      ( 17 Oct 2018 09:01 )             44.8     10-17    139  |  99  |  19  ----------------------------<  89  4.4   |  27  |  0.88    Ca    10.0      17 Oct 2018 07:10      proBNP:   Lipid Profile:   HgA1c:   TSH:

## 2018-10-19 ENCOUNTER — TRANSCRIPTION ENCOUNTER (OUTPATIENT)
Age: 63
End: 2018-10-19

## 2018-10-19 VITALS
HEART RATE: 87 BPM | OXYGEN SATURATION: 98 % | RESPIRATION RATE: 18 BRPM | SYSTOLIC BLOOD PRESSURE: 111 MMHG | TEMPERATURE: 98 F | DIASTOLIC BLOOD PRESSURE: 79 MMHG

## 2018-10-19 PROBLEM — F41.9 ANXIETY DISORDER, UNSPECIFIED: Chronic | Status: ACTIVE | Noted: 2018-10-05

## 2018-10-19 PROCEDURE — 87186 SC STD MICRODIL/AGAR DIL: CPT

## 2018-10-19 PROCEDURE — 70450 CT HEAD/BRAIN W/O DYE: CPT

## 2018-10-19 PROCEDURE — 85730 THROMBOPLASTIN TIME PARTIAL: CPT

## 2018-10-19 PROCEDURE — 87798 DETECT AGENT NOS DNA AMP: CPT

## 2018-10-19 PROCEDURE — 87040 BLOOD CULTURE FOR BACTERIA: CPT

## 2018-10-19 PROCEDURE — 80076 HEPATIC FUNCTION PANEL: CPT

## 2018-10-19 PROCEDURE — 97530 THERAPEUTIC ACTIVITIES: CPT

## 2018-10-19 PROCEDURE — 86200 CCP ANTIBODY: CPT

## 2018-10-19 PROCEDURE — 90686 IIV4 VACC NO PRSV 0.5 ML IM: CPT

## 2018-10-19 PROCEDURE — 84100 ASSAY OF PHOSPHORUS: CPT

## 2018-10-19 PROCEDURE — 87581 M.PNEUMON DNA AMP PROBE: CPT

## 2018-10-19 PROCEDURE — 82550 ASSAY OF CK (CPK): CPT

## 2018-10-19 PROCEDURE — 70551 MRI BRAIN STEM W/O DYE: CPT

## 2018-10-19 PROCEDURE — 86038 ANTINUCLEAR ANTIBODIES: CPT

## 2018-10-19 PROCEDURE — 82947 ASSAY GLUCOSE BLOOD QUANT: CPT

## 2018-10-19 PROCEDURE — 97116 GAIT TRAINING THERAPY: CPT

## 2018-10-19 PROCEDURE — 87086 URINE CULTURE/COLONY COUNT: CPT

## 2018-10-19 PROCEDURE — 86756 RESPIRATORY VIRUS ANTIBODY: CPT

## 2018-10-19 PROCEDURE — 85014 HEMATOCRIT: CPT

## 2018-10-19 PROCEDURE — 73130 X-RAY EXAM OF HAND: CPT

## 2018-10-19 PROCEDURE — 74177 CT ABD & PELVIS W/CONTRAST: CPT

## 2018-10-19 PROCEDURE — 84132 ASSAY OF SERUM POTASSIUM: CPT

## 2018-10-19 PROCEDURE — 82330 ASSAY OF CALCIUM: CPT

## 2018-10-19 PROCEDURE — 93005 ELECTROCARDIOGRAM TRACING: CPT

## 2018-10-19 PROCEDURE — 84484 ASSAY OF TROPONIN QUANT: CPT

## 2018-10-19 PROCEDURE — 93321 DOPPLER ECHO F-UP/LMTD STD: CPT

## 2018-10-19 PROCEDURE — 93306 TTE W/DOPPLER COMPLETE: CPT

## 2018-10-19 PROCEDURE — 82435 ASSAY OF BLOOD CHLORIDE: CPT

## 2018-10-19 PROCEDURE — 86431 RHEUMATOID FACTOR QUANT: CPT

## 2018-10-19 PROCEDURE — 83735 ASSAY OF MAGNESIUM: CPT

## 2018-10-19 PROCEDURE — 93308 TTE F-UP OR LMTD: CPT

## 2018-10-19 PROCEDURE — 82553 CREATINE MB FRACTION: CPT

## 2018-10-19 PROCEDURE — 84295 ASSAY OF SERUM SODIUM: CPT

## 2018-10-19 PROCEDURE — 87486 CHLMYD PNEUM DNA AMP PROBE: CPT

## 2018-10-19 PROCEDURE — 82803 BLOOD GASES ANY COMBINATION: CPT

## 2018-10-19 PROCEDURE — 85652 RBC SED RATE AUTOMATED: CPT

## 2018-10-19 PROCEDURE — 80053 COMPREHEN METABOLIC PANEL: CPT

## 2018-10-19 PROCEDURE — 97161 PT EVAL LOW COMPLEX 20 MIN: CPT

## 2018-10-19 PROCEDURE — 84443 ASSAY THYROID STIM HORMONE: CPT

## 2018-10-19 PROCEDURE — 85027 COMPLETE CBC AUTOMATED: CPT

## 2018-10-19 PROCEDURE — 86140 C-REACTIVE PROTEIN: CPT

## 2018-10-19 PROCEDURE — 85610 PROTHROMBIN TIME: CPT

## 2018-10-19 PROCEDURE — 81001 URINALYSIS AUTO W/SCOPE: CPT

## 2018-10-19 PROCEDURE — 71260 CT THORAX DX C+: CPT

## 2018-10-19 PROCEDURE — 99285 EMERGENCY DEPT VISIT HI MDM: CPT | Mod: 25

## 2018-10-19 PROCEDURE — 87633 RESP VIRUS 12-25 TARGETS: CPT

## 2018-10-19 PROCEDURE — 96374 THER/PROPH/DIAG INJ IV PUSH: CPT

## 2018-10-19 PROCEDURE — 97112 NEUROMUSCULAR REEDUCATION: CPT

## 2018-10-19 PROCEDURE — 80048 BASIC METABOLIC PNL TOTAL CA: CPT

## 2018-10-19 PROCEDURE — 83605 ASSAY OF LACTIC ACID: CPT

## 2018-10-19 PROCEDURE — 86225 DNA ANTIBODY NATIVE: CPT

## 2018-10-19 RX ORDER — MIRTAZAPINE 45 MG/1
1 TABLET, ORALLY DISINTEGRATING ORAL
Qty: 0 | Refills: 0 | COMMUNITY
Start: 2018-10-19

## 2018-10-19 RX ORDER — ESCITALOPRAM OXALATE 10 MG/1
3 TABLET, FILM COATED ORAL
Qty: 0 | Refills: 0 | COMMUNITY
Start: 2018-10-19

## 2018-10-19 RX ORDER — MECLIZINE HCL 12.5 MG
1 TABLET ORAL
Qty: 0 | Refills: 0 | COMMUNITY
Start: 2018-10-19

## 2018-10-19 RX ORDER — COLCHICINE 0.6 MG
1 TABLET ORAL
Qty: 0 | Refills: 0 | COMMUNITY
Start: 2018-10-19

## 2018-10-19 RX ADMIN — MIRTAZAPINE 15 MILLIGRAM(S): 45 TABLET, ORALLY DISINTEGRATING ORAL at 00:35

## 2018-10-19 RX ADMIN — INFLUENZA VIRUS VACCINE 0.5 MILLILITER(S): 15; 15; 15; 15 SUSPENSION INTRAMUSCULAR at 15:04

## 2018-10-19 RX ADMIN — Medication 0.6 MILLIGRAM(S): at 05:35

## 2018-10-19 RX ADMIN — Medication 3 MILLIGRAM(S): at 00:36

## 2018-10-19 RX ADMIN — HEPARIN SODIUM 5000 UNIT(S): 5000 INJECTION INTRAVENOUS; SUBCUTANEOUS at 05:35

## 2018-10-19 RX ADMIN — Medication 12.5 MILLIGRAM(S): at 00:35

## 2018-10-19 NOTE — DISCHARGE NOTE ADULT - PROVIDER TOKENS
FREE:[LAST:[Nishant],FIRST:[Yani],PHONE:[phone]],FAX:[(   [phone]    -],ADDRESS:[Your PMD]],TOKEN:'[id number]',TOKEN:'[id number]'

## 2018-10-19 NOTE — PROGRESS NOTE ADULT - ASSESSMENT
1. isolated episode of SVT notrelated to dizziness  2. dizziness- vestibular secodary to fall secondary to cervical spine disease  3. pericardial effusion asymptomatic adriane vidence of tamponade    Recommend  physical therpy-ENT exercise etc  repeat echo 3 weeks   discharge to rehab/home pending status with dizziness
1. large loculated pericaridal effusion etiology unclear-?viral pericardits, rheum inflanmmatory disease  2. no evidence of tamponade  3. right flank pain_?pyeolonephritis    Recommend  urology followup  colchicine and motrin  rheum follwoup re: serologies  check urine cultures  continue antibiotics
1. large loculated pericaridal effusion etiology unclear-?viral pericardits,   2. no evidence of tamponade  3. right flank pain_?pyeolonephritis  4. Persistent dizziness of unclear etiology-neuro evaluation negative, possible vertigo peripheral, no orthostatic cchanges  5. Generalized anxiety    Recommend  no further cardiac workup needed at the present time  Discharge planning  Follow-up echocardiogram as outpatien  ENT consultation as  outpatient
1. large loculated pericaridal effusion etiology unclear-?viral pericardits, rheum inflanmmatory disease  2. no evidence of tamponade but some RV comprssion  3. right flank pain_?pyeolonephritis    Recommend  urology followup  colchicine and motrin  rheum follwoup re: serologies  check urine cultures  continue antibiotics
1. no acute cardiac issues  2. BP improved  3.?sepsis pyelonephritis  4. incidental pericardial effusion  5. anxiety and depression    Recommend  2 D echo as baseline  no further cardiac workup
62 yo with anxiety  Presebting to ER with subjective chills,abd pain and flank pain  ? Dysuria(vague historian)  Pyuria on UA  Urine Cx with e coli  CT with ? urothelial enhancement -? Pyelonephritis  Stable    REc:  1) follow blood cx,follow E coli sensitivities  2) Follow clinically  3) Empiric Ceftriaxone-no risk factors for MDR pathogens    4) Will tailor plan for ID issues  per course,results.Will defer to primary team on management of other issues.  Case d/w Med NP,      I will be away till 10/9/18.  ID service will cover and follow patient.  Please call [phone] if any issues or questions
62 yo with anxiety  Presebting to ER with subjective chills,abd pain and flank pain  ? Dysuria(vague historian)  Pyuria on UA  Urine Cx with e coli sensitive  CT with ? urothelial enhancement -? Pyelonephritis  Stable  Doubt her current symptoms are related to UTI  Would recommend change to po ceftin-continue X 1 more day to finish 7 day course for possible pyelo in view of CT findings  Will tailor plan for ID issues  per course,results.Will defer to primary team on management of other issues.  Case d/w Med NP  Will Follow.  Beeper [id number]/afterhours/No response-[id number]
62 yo with anxiety  Presenting to ER with subjective chills,abd pain and flank pain  Diziness ,incontinence  No focal weakness  ? Dysuria(vague historian)  Pyuria on UA  Urine Cx with e coli sensitive  CT with ? urothelial enhancement -? Pyelonephritis  Stable  Doubt her current symptoms are related to UTI  s/p abx course  Will defer to primary team ,neurology  on management of other issues.  Case d/w Med NP  ID will follow as needed,please call [phone] if any questions or issues.
HPI: 63 year old woman with pmhx depression, anxiety, PTSD admitted with 1 week of body aches, sweats, chills, nausea, mild loose stools, dysuria, went to see PMD and found to be hypotensive referred to ED, +UA, started on fluids and antibiotics for suspected UTI/pyleo. Pt reports was ambulating from bathroom, had stood washed hands and then felt acutely lightheaded, legs felt 'like jelly' and fell into the door, hit head, since then has had vertiginous dizziness, positional. Has hx of a few bouts of mild vertigo in past in bed when turns head. CT head no acute changes. Denies any headaches, no diplopia, no facial or limb weakness, paresthesias or dysarthria. Has had some mild paresthesias in hands and feet this week. Exam nonfocal.    A/P: Presentation consistent with near syncope secondary to hypotension in setting of UTI/dehydration, Suspect vertigo BPV triggered by head impact with fall into door. CT head no acute changes. Doubt TIA/CVA.    Plan  1. Continue optimize hydration, IVF, check orthostatics  2. Abx per ID, follow up Pancx  3. Did not tolerate meclizine, for now will just observe as mildly improved.  4. Outpatient VNG   5. Supportive care  Will follow  Plan reviewed with pt
HPI: 63 year old woman with pmhx depression, anxiety, PTSD admitted with 1 week of body aches, sweats, chills, nausea, mild loose stools, dysuria, went to see PMD and found to be hypotensive referred to ED, +UA, started on fluids and antibiotics for suspected UTI/pyleo. Pt reports was ambulating from bathroom, had stood washed hands and then felt acutely lightheaded, legs felt 'like jelly' and fell into the door, hit head, since then has had vertiginous dizziness, positional. Has hx of a few bouts of mild vertigo in past in bed when turns head. CT head no acute changes. Denies any headaches, no diplopia, no facial or limb weakness, paresthesias or dysarthria. Has had some mild paresthesias in hands and feet this week. Exam nonfocal.    A/P: Presentation consistent with near syncope secondary to hypotension in setting of UTI/pyelonephritis/dehydration, Suspect vertigo BPPV triggered by head impact with fall into door +/- concussion. CT head no acute changes. Doubt TIA/CVA.    Plan  1. Continue optimize hydration  2. PT  3. Abx per ID, follow up Pancx  4. Did not tolerate meclizine, for now will just observe as mildly improved.  5. Outpatient VNG and vestibular therapy  6. Supportive care as per primary team  7. Otherwise no further inpatient neurologic intervention at this time.
HPI: 63 year old woman with pmhx depression, anxiety, PTSD admitted with 1 week of body aches, sweats, chills, nausea, mild loose stools, dysuria, went to see PMD and found to be hypotensive referred to ED, +UA, started on fluids and antibiotics for suspected UTI/pyleo. Pt reports was ambulating from bathroom, had stood washed hands and then felt acutely lightheaded, legs felt 'like jelly' and fell into the door, hit head, since then has had vertiginous dizziness, positional. Has hx of a few bouts of mild vertigo in past in bed when turns head. CT head no acute changes. Denies any headaches, no diplopia, no facial or limb weakness, paresthesias or dysarthria. Has had some mild paresthesias in hands and feet this week. Exam nonfocal.    A/P: Presentation consistent with near syncope secondary to hypotension in setting of UTI/pyelonephritis/dehydration, Suspect vertigo BPPV triggered by head impact with fall into door +/- concussion. CT head no acute changes. Doubt TIA/CVA.    Vertiginous dizziness persists, suspect peripheral vestibulopathy doubt central, suggested MRI brain for completeness, pt reports she is severely claustrophobic,   Regarding episode of small amount of urinary incontinence, no evidence of myelopathic features on exam, likely related to urologic dysuria.    Plan  1. ENT consult for epley for BPV, seen by attending who did not do a maneveur but wanted imaging which is ordered  Concern for fact dizziness is continuous, though that does not exclude peripheral as can be peripheral and not BPPV  Will need valium, when discussed with her at time of admission she refused, now will allow valium for mri    2. Continue meclizine and anti emetics  prn  3. Abx per ID, follow up Pancx  4. Outpatient VNG and vestibular therapy  5. OOB to chair as tolerates  6. Supportive care as per primary team  Will follow  Plan reviewed with pt and RN
HPI: 63 year old woman with pmhx depression, anxiety, PTSD admitted with 1 week of body aches, sweats, chills, nausea, mild loose stools, dysuria, went to see PMD and found to be hypotensive referred to ED, +UA, started on fluids and antibiotics for suspected UTI/pyleo. Pt reports was ambulating from bathroom, had stood washed hands and then felt acutely lightheaded, legs felt 'like jelly' and fell into the door, hit head, since then has had vertiginous dizziness, positional. Has hx of a few bouts of mild vertigo in past in bed when turns head. CT head no acute changes. Denies any headaches, no diplopia, no facial or limb weakness, paresthesias or dysarthria. Has had some mild paresthesias in hands and feet this week. Exam nonfocal.    A/P: Presentation consistent with near syncope secondary to hypotension in setting of UTI/pyelonephritis/dehydration, Suspect vertigo BPPV triggered by head impact with fall into door +/- concussion. CT head no acute changes. Doubt TIA/CVA.    Vertiginous dizziness persists, suspect peripheral vestibulopathy doubt central, suggested MRI brain for completeness, pt reports she is severely claustrophobic,   Regarding episode of small amount of urinary incontinence, no evidence of myelopathic features on exam, likely related to urologic dysuria.    Plan  1. still dizzy post epley.  will take time to improve s/p concussion and with her underlying chronic neck pain  - for MRI brain for completeness.  r/o central cause per ENT.  doubt will be significantly abnormal.  2. Continue meclizine and anti emetics  prn  3. Abx per ID, follow up Pancx  4. Outpatient VNG and vestibular therapy  5. OOB to chair as tolerates  6. Supportive care as per primary team  Will follow  Plan reviewed with pt
HPI: 63 year old woman with pmhx depression, anxiety, PTSD admitted with 1 week of body aches, sweats, chills, nausea, mild loose stools, dysuria, went to see PMD and found to be hypotensive referred to ED, +UA, started on fluids and antibiotics for suspected UTI/pyleo. Pt reports was ambulating from bathroom, had stood washed hands and then felt acutely lightheaded, legs felt 'like jelly' and fell into the door, hit head, since then has had vertiginous dizziness, positional. Has hx of a few bouts of mild vertigo in past in bed when turns head. CT head no acute changes. Denies any headaches, no diplopia, no facial or limb weakness, paresthesias or dysarthria. Has had some mild paresthesias in hands and feet this week. Exam nonfocal.    A/P: Presentation consistent with near syncope secondary to hypotension in setting of UTI/pyelonephritis/dehydration, Suspect vertigo BPPV triggered by head impact with fall into door +/- concussion. CT head no acute changes. Doubt TIA/CVA.    Vertiginous dizziness persists, suspect peripheral vestibulopathy doubt central, suggested MRI brain for completeness, pt reports she is severely claustrophobic,   Regarding episode of small amount of urinary incontinence, no evidence of myelopathic features on exam, likely related to urologic dysuria.    Plan  1. still dizzy post epley.  will take time to improve s/p concussion and with her underlying chronic neck pain  - for MRI brain for completeness. results were normal.  no central cause for vertigo.  2. Continue meclizine and anti emetics  prn  3. Abx per ID, follow up Pancx  4. Outpatient VNG and vestibular therapy  5. OOB to chair as tolerates  6. PT to resee.  Does she qualify for inpt rehab?  She is unsure her home is safe as she lives alone with no help
HPI: 63 year old woman with pmhx depression, anxiety, PTSD admitted with 1 week of body aches, sweats, chills, nausea, mild loose stools, dysuria, went to see PMD and found to be hypotensive referred to ED, +UA, started on fluids and antibiotics for suspected UTI/pyleo. Pt reports was ambulating from bathroom, had stood washed hands and then felt acutely lightheaded, legs felt 'like jelly' and fell into the door, hit head, since then has had vertiginous dizziness, positional. Has hx of a few bouts of mild vertigo in past in bed when turns head. CT head no acute changes. Denies any headaches, no diplopia, no facial or limb weakness, paresthesias or dysarthria. Has had some mild paresthesias in hands and feet this week. Exam nonfocal.    A/P: Presentation consistent with near syncope secondary to hypotension in setting of UTI/pyelonephritis/dehydration, Suspect vertigo BPPV triggered by head impact with fall into door +/- concussion. CT head no acute changes. Doubt TIA/CVA.    Vertiginous dizziness persists, suspect peripheral vestibulopathy doubt central, suggested MRI brain for completeness, pt reports she is severely claustrophobic,   Regarding episode of small amount of urinary incontinence, no evidence of myelopathic features on exam, likely related to urologic dysuria.    Plan  1. still dizzy post epley.  will take time to improve s/p concussion and with her underlying chronic neck pain  - for MRI brain for completeness. results were normal.  no central cause for vertigo.  2. Continue meclizine and anti emetics  prn  3. Abx per ID, follow up Pancx  4. PT re eval for repeat epley and rehab qualification.    5. OOB to chair as tolerates  6. d/c planning  7. no further neuro w/u
HPI: 63 year old woman with pmhx depression, anxiety, PTSD admitted with 1 week of body aches, sweats, chills, nausea, mild loose stools, dysuria, went to see PMD and found to be hypotensive referred to ED, +UA, started on fluids and antibiotics for suspected UTI/pyleo. Pt reports was ambulating from bathroom, had stood washed hands and then felt acutely lightheaded, legs felt 'like jelly' and fell into the door, hit head, since then has had vertiginous dizziness, positional. Has hx of a few bouts of mild vertigo in past in bed when turns head. CT head no acute changes. Denies any headaches, no diplopia, no facial or limb weakness, paresthesias or dysarthria. Has had some mild paresthesias in hands and feet this week. Exam nonfocal.    A/P: Presentation consistent with near syncope secondary to hypotension in setting of UTI/pyelonephritis/dehydration, Suspect vertigo BPPV triggered by head impact with fall into door +/- concussion. CT head no acute changes. Doubt TIA/CVA.    Vertiginous dizziness persists, suspect peripheral vestibulopathy doubt central, suggested MRI brain for completeness, pt reports she is severely claustrophobic, declines sedative like valium because she is in 'recovery' from substance abuse.  Regarding episode of small amount of urinary incontinence, no evidence of myelopathic features on exam, likely related to urologic dysuria.    Plan  1. ENT consult for epley for BPV  2. Continue meclizine prn  3. Abx per ID, follow up Pancx  4. Outpatient VNG and vestibular therapy  5. OOB to chair as tolerates  6. Supportive care as per primary team  Will follow  Plan reviewed with pt and Med NP
HPI: 63 year old woman with pmhx depression, anxiety, PTSD admitted with 1 week of body aches, sweats, chills, nausea, mild loose stools, dysuria, went to see PMD and found to be hypotensive referred to ED, +UA, started on fluids and antibiotics for suspected UTI/pyleo. Pt reports was ambulating from bathroom, had stood washed hands and then felt acutely lightheaded, legs felt 'like jelly' and fell into the door, hit head, since then has had vertiginous dizziness, positional. Has hx of a few bouts of mild vertigo in past in bed when turns head. CT head no acute changes. Denies any headaches, no diplopia, no facial or limb weakness, paresthesias or dysarthria. Has had some mild paresthesias in hands and feet this week. Exam nonfocal.    A/P: Presentation consistent with near syncope secondary to hypotension in setting of UTI/pyelonephritis/dehydration, Suspect vertigo BPPV triggered by head impact with fall into door +/- concussion. CT head no acute changes. Doubt TIA/CVA.    Vertiginous dizziness persists, suspect peripheral vestibulopathy doubt central, suggested MRI brain for completeness, pt reports she is severely claustrophobic, declines sedative like valium because she is in 'recovery' from substance abuse.  Regarding episode of small amount of urinary incontinence, no evidence of myelopathic features on exam, likely related to urologic dysuria.    Plan  1. ENT consult for epley for BPV, appreciate consult, attending to see  Will ask PT to see as well, will clarify with new order    2. Continue meclizine and anti emetics  prn  3. Abx per ID, follow up Pancx  4. Outpatient VNG and vestibular therapy  5. OOB to chair as tolerates  6. Supportive care as per primary team  Will follow  Plan reviewed with pt and RN
Imp:  62 yo female with weakness, dizziness, found to have UTI, pericardial fluid.  Serologic w/u negative for systemic rheum dz.  ?viral/post infxn ?isolated autoimmune pericarditis    Rec:  Agree with trial of colchicine  Try to hold off on steroids though can be used empirically if needed  d/w cardio and IM
pt w/ weakness / syncope/ hypotension chills/cvat / abd tenderness  likly pyelo  e coli bacteuria  id eval noted  urology  eval noted  ct f/u in 1 month as outpt   ct c/a/p noted   iv fluids prn  dvt proph   cont lexapro  cy head neg  echo + pericardial effusion  cards eval noted  rheum eval noted  inc crp/esr  c/w colchicine + motrin  Rheum f/u reg serologies  oob  pt  neuro eval noted
pt w/ weakness / syncope/ hypotension chills/cvat / abd tenderness  likly pyelo  e coli bacteuria  id eval noted  urology f/u  ct c/a/p noted   iv fluids  dvt proph   cont lexapro  cy head neg  echo + pericardial effusion  cards eval noted  c/w colchicine + motrin  Rheum f/u reg serologies
pt w/ weakness / syncope/ hypotension chills/cvat / abd tenderness  likly pyelo  e coli bacteuria  id eval noted  urology f/u  ct c/a/p noted   iv fluids  dvt proph   cont lexapro  cy head neg  echo for small per effusion  cards eval noted
pt w/ weakness / syncope/ hypotension chills/cvat / abd tenderness  likly pyelo  e coli bacteuria  id f/u  abs as per id   urology  eval noted  ct f/u in 1 month as outpt   ct c/a/p noted   iv fluids prn  dvt proph   cont lexapro  ct head neg  neuro eval noted  echo + pericardial effusion  cards eval noted  conr colchicine and stop motrin on d/c with out pt f/u and echo in 2 weeks  pt resistqant for drainage here in hospital   no tamponade  rheum eval noted  Rheum f/u reg serologies  oob  pt  left message for lucrecia who pt wishes for me to speak with her about her condition
pt w/ weakness / syncope/ hypotension chills/cvat / abd tenderness  likly pyelo  e coli bacteuria  id f/u noted  abs  course over   urology  eval noted  ct f/u in 1 month as outpt   iv fluids prn  dvt proph   cont lexapro  ct head neg  MRI reviewed, no obvious cause of dizziness, Neuro to comment on white matter changes  neuro f/u noted  echo + pericardial effusion  cards eval noted  cont  colchicine  pt resistant for drainage here in hospital   no tamponade  rheum eval noted  oob  pt re-eval for rehab today   d/c planning
pt w/ weakness / syncope/ hypotension chills/cvat / abd tenderness  likly pyelo  e coli bacteuria  id f/u noted  abs  course over   urology  eval noted  ct f/u in 1 month as outpt   iv fluids prn  dvt proph   cont lexapro  ct head neg  MRI reviewed, no obvious cause of dizziness, Neuro to comment on white matter changes  neuro f/u noted  echo + pericardial effusion  cards eval noted  cont  colchicine  pt resistant for drainage here in hospital   no tamponade  rheum eval noted  oob  pt re-eval for rehab today   d/c planning to [facility]
pt w/ weakness / syncope/ hypotension chills/cvat / abd tenderness  likly pyelo  e coli bacteuria  id f/u noted  abs  course over   urology  eval noted  ct f/u in 1 month as outpt   iv fluids prn  dvt proph   cont lexapro  ct head neg  MRI reviewed, no obvious cause of dizziness, Neuro to comment on white matter changes  neuro f/u noted  echo + pericardial effusion  cards eval noted  cont  colchicine  pt resistant for drainage here in hospital   no tamponade  rheum eval noted  oob  pt re-eval for rehab today   d/c planning to [facility]
pt w/ weakness / syncope/ hypotension chills/cvat / abd tenderness  likly pyelo  e coli bacteuria  id f/u oted  abs as per id   urology  eval noted  ct f/u in 1 month as outpt   ct c/a/p noted   iv fluids prn  dvt proph   cont lexapro  ct head neg  MRI reviewed, no obvious cause of dizziness, Neuro to comment on white matter changes  neuro f/u noted  echo + pericardial effusion  cards eval noted  cont  colchicine  pt resistant for drainage here in hospital   no tamponade  rheum eval noted  oob  pt re-eval  d/c planning
pt w/ weakness / syncope/ hypotension chills/cvat / abd tenderness  likly pyelo  e coli bacteuria  id f/u oted  abs as per id   urology  eval noted  ct f/u in 1 month as outpt   ct c/a/p noted   iv fluids prn  dvt proph   cont lexapro  ct head neg  MRI today  neuro f/u noted  echo + pericardial effusion  cards eval noted  cont  colchicine  pt resistant for drainage here in hospital   no tamponade  rheum eval noted  oob  pt  d/c planning if MRI neg
pt w/ weakness / syncope/ hypotension chills/cvat / abd tenderness  likly pyelo  e coli bacteuria  id f/u oted  abs as per id   urology  eval noted  ct f/u in 1 month as outpt   ct c/a/p noted   iv fluids prn  dvt proph   cont lexapro  ct head neg  neuro f/u  for persistent vertigo/ and incontinence  echo + pericardial effusion  cards eval noted  cont  colchicine and stop motrin on d/c with out pt f/u and echo in 2 weeks  pt resistqant for drainage here in hospital   no tamponade  rheum eval noted  Rheum f/u reg serologies  oob  pt  spoke w/ lucrecia at length concerning pt
pt w/ weakness / syncope/ hypotension chills/cvat / abd tenderness  likly pyelo  e coli bacteuria  id f/u oted  abs as per id   urology  eval noted  ct f/u in 1 month as outpt   ct c/a/p noted   iv fluids prn  dvt proph   cont lexapro  ct head neg  neuro f/u noted  echo + pericardial effusion  cards eval noted  cont  colchicine  pt resistant for drainage here in hospital   no tamponade  rheum eval noted  oob  pt  very dizzy s/p vestibular study today  Meclizine and zofran PRN  d/c planning when symptoms improve if no objection from consulting services
pt w/ weakness / syncope/ hypotension chills/cvat / abd tenderness  likly pyelo  e coli bacteuria  id f/u oted  abs as per id   urology  eval noted  ct f/u in 1 month as outpt   ct c/a/p noted   iv fluids prn  dvt proph   cont lexapro  ct head neg  neuro f/u noted  mri today  for persistent vertigo/ and incontinence  echo + pericardial effusion  cards eval noted  cont  colchicine and stop motrin   pt resistant for drainage here in hospital   no tamponade  rheum eval noted  Rheum f/u reg serologies  oob  pt  spoke w/ lucrecia at length concerning pt
pt w/ weakness / syncope/ hypotension chills/cvat / abd tenderness  likly pyelo  likely sepsis  id eval  urology eval  ct c/a/p noted   iv fluids  dvt proph   cont lexapre  check ct head s/p fall  echo for small per effusion  cards eval   cultures
62 yo with anxiety  Presebting to ER with subjective chills,abd pain and flank pain  ? Dysuria(vague historian)  Pyuria on UA  Urine Cx with e coli  CT with ? urothelial enhancement -? Pyelonephritis  Stable    REc:  -cont CTX  -no fever  -switch to po ABX soon              Please call [phone] if any issues or questions
62 yo with anxiety  Presebting to ER with subjective chills,abd pain and flank pain  ? Dysuria(vague historian)  Pyuria on UA  Urine Cx with e coli  CT with ? urothelial enhancement -? Pyelonephritis  Stable    REc:  -cont CTX  -no fever  -switch to po ABX soon              Please call [phone] if any issues or questions

## 2018-10-19 NOTE — PROGRESS NOTE ADULT - PROVIDER SPECIALTY LIST ADULT
Cardiology
Infectious Disease
Internal Medicine
Neurology
Rheumatology
Cardiology
Cardiology
Infectious Disease

## 2018-10-19 NOTE — PROGRESS NOTE ADULT - SUBJECTIVE AND OBJECTIVE BOX
CHIEF COMPLAINT:Patient is a 63y old  Female who presents with a chief complaint of dizziness percardia effusion (15 Oct 2018 10:23)  no acute events    PAST MEDICAL & SURGICAL HISTORY:  Anxiety  No significant past surgical history          REVIEW OF SYSTEMS:  CONSTITUTIONAL: No fever, weight loss, or fatigue  EYES: No eye pain, visual disturbances, or discharge  NECK: No pain or stiffness  RESPIRATORY: No cough, wheezing, chills or hemoptysis; No Shortness of Breath  CARDIOVASCULAR: No chest pain, palpitations, passing out  GASTROINTESTINAL: No abdominal or epigastric pain. No nausea, vomiting, or hematemesis; No diarrhea or constipation. No melena or hematochezia.  GENITOURINARY: No dysuria, frequency, hematuria, or incontinence  NEUROLOGICAL: No headaches, memory loss, loss of strength, numbness, or tremors  dizziness and vertigo no better  MUSCULOSKELETAL: No joint pain or swelling; No muscle, back, or extremity pain      Medications:  MEDICATIONS  (STANDING):  colchicine 0.6 milliGRAM(s) Oral two times a day  escitalopram 30 milliGRAM(s) Oral daily  heparin  Injectable 5000 Unit(s) SubCutaneous every 12 hours  influenza   Vaccine 0.5 milliLiter(s) IntraMuscular once  mirtazapine 15 milliGRAM(s) Oral at bedtime    MEDICATIONS  (PRN):  meclizine 12.5 milliGRAM(s) Oral every 8 hours PRN Dizziness  ondansetron Injectable 4 milliGRAM(s) IV Push every 6 hours PRN Nausea    	    PHYSICAL EXAM:  T(C): 36.9 (10-19-18 @ 00:30), Max: 36.9 (10-18-18 @ 21:07)  HR: 87 (10-19-18 @ 00:30) (85 - 87)  BP: 111/79 (10-19-18 @ 00:30) (105/69 - 111/79)  RR: 18 (10-19-18 @ 00:30) (17 - 18)  SpO2: 98% (10-19-18 @ 00:30) (97% - 98%)  Wt(kg): --  I&O's Summary    18 Oct 2018 07:01  -  19 Oct 2018 07:00  --------------------------------------------------------  IN: 670 mL / OUT: 1150 mL / NET: -480 mL      Appearance: Normal	  HEENT:   Normal oral mucosa, PERRL, EOMI	  Lymphatic: No lymphadenopathy  Cardiovascular: Normal S1 S2, No JVD, No murmurs, No edema  Respiratory: Lungs clear to auscultation	  Psychiatry: A & O x 3,   Gastrointestinal:  Soft, Non-tender, + BS	  Skin: No rashes, No ecchymoses, No cyanosis	  Neurologic: Non-focal unable to fully assess   Extremities: Normal range of motion, No clubbing, cyanosis or edema  Vascular: Peripheral pulses palpable 2+ bilaterally    LABS:	 	    CARDIAC MARKERS:                  proBNP:   Lipid Profile:   HgA1c:   TSH:

## 2018-10-19 NOTE — DISCHARGE NOTE ADULT - MEDICATION SUMMARY - MEDICATIONS TO TAKE
I will START or STAY ON the medications listed below when I get home from the hospital:    mirtazapine 15 mg oral tablet  -- 1 tab(s) by mouth once a day (at bedtime)  -- Indication: For Depression, unspecified depression type    escitalopram 10 mg oral tablet  -- 3 tab(s) by mouth once a day  -- Indication: For Depression, unspecified depression type    meclizine 12.5 mg oral tablet  -- 1 tab(s) by mouth every 8 hours, As needed, Dizziness  -- Indication: For Vertigo    colchicine 0.6 mg oral tablet  -- 1 tab(s) by mouth 2 times a day  -- Indication: For Pericardial effusion

## 2018-10-19 NOTE — DISCHARGE NOTE ADULT - PLAN OF CARE
resolved. free from infection. v/s stable. s/p ceftriaxone.  HOME CARE INSTRUCTIONS  f you were prescribed antibiotics, take them exactly as your caregiver instructs you. Finish the medication even if you feel better after you have only taken some of the medication.  Drink enough water and fluids to keep your urine clear or pale yellow.  Avoid caffeine, tea, and carbonated beverages. They tend to irritate your bladder.  Empty your bladder often. Avoid holding urine for long periods of time.  Empty your bladder before and after sexual intercourse.  After a bowel movement, women should cleanse from front to back. Use each tissue only once.  SEEK MEDICAL CARE IF:  You have back pain.  You develop a fever.  Your symptoms do not begin to resolve within 3 days.  SEEK IMMEDIATE MEDICAL CARE IF:  You have severe back pain or lower abdominal pain.  You develop chills.  You have nausea or vomiting.  You have continued burning or discomfort with urination. continue with Remeron and Lexapro.  SEEK IMMEDIATE CARE IF  You have thoughts about hurting yourself or others.  You lose touch with reality (have psychotic symptoms).  You are taking medicine for depression and have a serious side effect Continue with meclizine.   CT head neg, MRI brain shows no obvious cause of dizziness.  seen by Neuro, white matter changes from MRI brain.   Outpatient VNG and vestibular therapy. Echo shows Moderate pericardial effusion.  Continue with colchicine. s/p fall in ED. Xray of L hand w/ + avulsion fx of 5th digit. Splint to left hand 5th finger placed by plastic. supportive care.

## 2018-10-19 NOTE — DISCHARGE NOTE ADULT - CARE PLAN
Principal Discharge DX:	Pyelonephritis  Goal:	resolved. free from infection. v/s stable.  Assessment and plan of treatment:	s/p ceftriaxone.  HOME CARE INSTRUCTIONS  f you were prescribed antibiotics, take them exactly as your caregiver instructs you. Finish the medication even if you feel better after you have only taken some of the medication.  Drink enough water and fluids to keep your urine clear or pale yellow.  Avoid caffeine, tea, and carbonated beverages. They tend to irritate your bladder.  Empty your bladder often. Avoid holding urine for long periods of time.  Empty your bladder before and after sexual intercourse.  After a bowel movement, women should cleanse from front to back. Use each tissue only once.  SEEK MEDICAL CARE IF:  You have back pain.  You develop a fever.  Your symptoms do not begin to resolve within 3 days.  SEEK IMMEDIATE MEDICAL CARE IF:  You have severe back pain or lower abdominal pain.  You develop chills.  You have nausea or vomiting.  You have continued burning or discomfort with urination.  Secondary Diagnosis:	Depression, unspecified depression type  Assessment and plan of treatment:	continue with Remeron and Lexapro.  SEEK IMMEDIATE CARE IF  You have thoughts about hurting yourself or others.  You lose touch with reality (have psychotic symptoms).  You are taking medicine for depression and have a serious side effect  Secondary Diagnosis:	Vertigo  Assessment and plan of treatment:	Continue with meclizine.   CT head neg, MRI brain shows no obvious cause of dizziness.  seen by Neuro, white matter changes from MRI brain.   Outpatient VNG and vestibular therapy.  Secondary Diagnosis:	Pericardial effusion  Assessment and plan of treatment:	Echo shows Moderate pericardial effusion.  Continue with colchicine.  Secondary Diagnosis:	Avulsion of skin of hand, left, sequela  Assessment and plan of treatment:	s/p fall in ED. Xray of L hand w/ + avulsion fx of 5th digit. Splint to left hand 5th finger placed by plastic. supportive care.

## 2018-10-19 NOTE — DISCHARGE NOTE ADULT - ADDITIONAL INSTRUCTIONS
please make an appointment with your PMD in 1 week.  follow up with neurology and outpt psych in 4 weeks.

## 2018-10-19 NOTE — DISCHARGE NOTE ADULT - SECONDARY DIAGNOSIS.
Depression, unspecified depression type Vertigo Pericardial effusion Avulsion of skin of hand, left, sequela

## 2018-10-19 NOTE — DISCHARGE NOTE ADULT - CARE PROVIDER_API CALL
Yani Dillard  Your PMD  Phone: [phone]  Fax: (   )    -    Francisco J Chicas), Plastic Surgery  [address]  [address]  [place] [address]  Phone: [phone]  Fax: [phone]    Syeda Pryor), Neurology  [address]  [address]  [place] [address]  Phone: [phone]  Fax: [phone]

## 2018-10-19 NOTE — DISCHARGE NOTE ADULT - PATIENT PORTAL LINK FT
You can access the [facility] Patient Portal, offered by [facility], by registering with the following website: [email or web address]

## 2018-11-09 ENCOUNTER — OUTPATIENT (OUTPATIENT)
Dept: OUTPATIENT SERVICES | Facility: HOSPITAL | Age: 63
LOS: 1 days | End: 2018-11-09
Payer: COMMERCIAL

## 2018-11-09 DIAGNOSIS — Z51.89 ENCOUNTER FOR OTHER SPECIFIED AFTERCARE: ICD-10-CM

## 2018-11-09 DIAGNOSIS — R27.9 UNSPECIFIED LACK OF COORDINATION: ICD-10-CM

## 2018-11-14 PROCEDURE — 97112 NEUROMUSCULAR REEDUCATION: CPT

## 2018-11-14 PROCEDURE — 97163 PT EVAL HIGH COMPLEX 45 MIN: CPT

## 2018-11-26 ENCOUNTER — APPOINTMENT (OUTPATIENT)
Dept: PSYCHIATRY | Facility: CLINIC | Age: 63
End: 2018-11-26

## 2018-12-05 ENCOUNTER — APPOINTMENT (OUTPATIENT)
Dept: MAMMOGRAPHY | Facility: CLINIC | Age: 63
End: 2018-12-05

## 2018-12-05 ENCOUNTER — APPOINTMENT (OUTPATIENT)
Dept: ULTRASOUND IMAGING | Facility: CLINIC | Age: 63
End: 2018-12-05

## 2018-12-05 ENCOUNTER — APPOINTMENT (OUTPATIENT)
Dept: RADIOLOGY | Facility: CLINIC | Age: 63
End: 2018-12-05

## 2018-12-13 ENCOUNTER — APPOINTMENT (OUTPATIENT)
Dept: RADIOLOGY | Facility: CLINIC | Age: 63
End: 2018-12-13
Payer: COMMERCIAL

## 2018-12-13 ENCOUNTER — OUTPATIENT (OUTPATIENT)
Dept: OUTPATIENT SERVICES | Facility: HOSPITAL | Age: 63
LOS: 1 days | End: 2018-12-13
Payer: COMMERCIAL

## 2018-12-13 ENCOUNTER — APPOINTMENT (OUTPATIENT)
Dept: MAMMOGRAPHY | Facility: CLINIC | Age: 63
End: 2018-12-13
Payer: COMMERCIAL

## 2018-12-13 DIAGNOSIS — Z00.00 ENCOUNTER FOR GENERAL ADULT MEDICAL EXAMINATION WITHOUT ABNORMAL FINDINGS: ICD-10-CM

## 2018-12-13 PROCEDURE — 77063 BREAST TOMOSYNTHESIS BI: CPT | Mod: 26

## 2018-12-13 PROCEDURE — 71046 X-RAY EXAM CHEST 2 VIEWS: CPT | Mod: 26

## 2018-12-13 PROCEDURE — 77063 BREAST TOMOSYNTHESIS BI: CPT

## 2018-12-13 PROCEDURE — 77067 SCR MAMMO BI INCL CAD: CPT | Mod: 26

## 2018-12-13 PROCEDURE — 77067 SCR MAMMO BI INCL CAD: CPT

## 2018-12-13 PROCEDURE — 71046 X-RAY EXAM CHEST 2 VIEWS: CPT

## 2019-02-01 ENCOUNTER — APPOINTMENT (OUTPATIENT)
Dept: PSYCHIATRY | Facility: CLINIC | Age: 64
End: 2019-02-01
Payer: SELF-PAY

## 2019-02-01 DIAGNOSIS — G47.00 INSOMNIA, UNSPECIFIED: ICD-10-CM

## 2019-02-01 DIAGNOSIS — F41.9 ANXIETY DISORDER, UNSPECIFIED: ICD-10-CM

## 2019-02-01 DIAGNOSIS — F43.10 POST-TRAUMATIC STRESS DISORDER, UNSPECIFIED: ICD-10-CM

## 2019-02-01 DIAGNOSIS — F10.21 ALCOHOL DEPENDENCE, IN REMISSION: ICD-10-CM

## 2019-02-01 DIAGNOSIS — F33.9 MAJOR DEPRESSIVE DISORDER, RECURRENT, UNSPECIFIED: ICD-10-CM

## 2019-02-01 DIAGNOSIS — F41.0 PANIC DISORDER [EPISODIC PAROXYSMAL ANXIETY]: ICD-10-CM

## 2019-02-01 PROCEDURE — 90834 PSYTX W PT 45 MINUTES: CPT

## 2019-05-03 ENCOUNTER — EMERGENCY (EMERGENCY)
Facility: HOSPITAL | Age: 64
LOS: 1 days | Discharge: HOME | End: 2019-05-03
Attending: EMERGENCY MEDICINE
Payer: COMMERCIAL

## 2019-05-03 VITALS
WEIGHT: 117.95 LBS | HEART RATE: 98 BPM | TEMPERATURE: 98 F | HEIGHT: 69 IN | RESPIRATION RATE: 18 BRPM | OXYGEN SATURATION: 99 % | SYSTOLIC BLOOD PRESSURE: 110 MMHG | DIASTOLIC BLOOD PRESSURE: 71 MMHG

## 2019-05-03 PROCEDURE — 99284 EMERGENCY DEPT VISIT MOD MDM: CPT | Mod: 25

## 2019-05-03 PROCEDURE — 93005 ELECTROCARDIOGRAM TRACING: CPT

## 2019-05-03 PROCEDURE — 93010 ELECTROCARDIOGRAM REPORT: CPT

## 2019-05-03 PROCEDURE — 90792 PSYCH DIAG EVAL W/MED SRVCS: CPT

## 2019-05-03 PROCEDURE — 99283 EMERGENCY DEPT VISIT LOW MDM: CPT

## 2019-05-03 RX ORDER — MECLIZINE HCL 12.5 MG
25 TABLET ORAL ONCE
Qty: 0 | Refills: 0 | Status: DISCONTINUED | OUTPATIENT
Start: 2019-05-03 | End: 2019-05-08

## 2019-05-03 RX ORDER — ESCITALOPRAM OXALATE 10 MG/1
30 TABLET, FILM COATED ORAL
Qty: 0 | Refills: 0 | COMMUNITY

## 2019-05-03 RX ORDER — DIAZEPAM 5 MG
5 TABLET ORAL ONCE
Qty: 0 | Refills: 0 | Status: COMPLETED | OUTPATIENT
Start: 2019-05-03 | End: 2019-05-03

## 2019-05-03 RX ORDER — HYDROXYZINE HCL 10 MG
1 TABLET ORAL
Qty: 0 | Refills: 0 | COMMUNITY

## 2019-05-03 RX ORDER — ALPRAZOLAM 0.25 MG
1 TABLET ORAL
Qty: 14 | Refills: 0 | OUTPATIENT
Start: 2019-05-03 | End: 2019-05-09
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
## 2019-05-03 NOTE — ED ADULT TRIAGE NOTE - CHIEF COMPLAINT QUOTE
c/o states needs 19 pills of xanax, due to insurance problems hasn't been able to see a therapist , has appt to see psychiatrist on 5/9 and needs her xanax.

## 2019-05-03 NOTE — ED BEHAVIORAL HEALTH ASSESSMENT NOTE - HPI (INCLUDE ILLNESS QUALITY, SEVERITY, DURATION, TIMING, CONTEXT, MODIFYING FACTORS, ASSOCIATED SIGNS AND SYMPTOMS)
63 year old female, single, retired, domiciled, with PPH significant for depression, anxiety, PTSD and no significant PMH presents requesting refill of xanax. She was last in [facility] 4 months ago for vertigo. Denies past psychiatric hospitalization or suicide attempts. She has a history of etoh dependence and rehab, sober 14yrs. She had been having PCP refill her medication but sought outpatient psychiatry and had intake with [facility] yesterday. She thought she would see a psychiatrist also, but that is not scheduled until 5/9/19. She called her PCP for a refill but hasn't heard back from him yet today. She has her paperwork and appointment cards. ISTOP shows last xanax rx 0.5mg bid filled on 4/2/19 with consistent use for the past few months. She admits to passive SI when she feels particularly anxious but denies current ideation, plan or intent. She is talkative, tangential with labile mood but reports appetite and sleep are fair. She is not delusional or paranoid. Denies hi/avh. She reports that she has supportive family and she is motivated for treatment.

## 2019-05-03 NOTE — ED ADULT NURSE NOTE - SUICIDE PROTECTIVE FACTORS
Identifies reasons for living/Responsibility to family and others/Supportive social network or family/Positive therapeutic relationships/Ability to cope with stress/Future oriented/Fear of death or dying due to pain/suffering/High frustration tolerance

## 2019-05-03 NOTE — ED ADULT NURSE NOTE - OBJECTIVE STATEMENT
Pt came to ER seeking enough Xanax to hold till her psychiatric appointment next week. Pt reports she went to [facility] and was sent  to [facility] and saw a Therapist but was unable to see Psychiatrist in time for refill to run out. Primary MD  refused Pt came to ER seeking enough Xanax to hold till her psychiatric appointment next week. Pt reports she went to [facility] and was sent  to [facility] and saw a Therapist but was unable to see Psychiatrist in time for refill to run out. Primary MD  refused to reorder. Pt tearful, reports long hx of PTSD, ETOH recovery of 14 years.  No significant medical hx reported. Denies pain and S/I, H/I and no psychosis observed

## 2019-05-03 NOTE — ED BEHAVIORAL HEALTH ASSESSMENT NOTE - NS ED BHA REVIEW OF ED CHART VITAL SIGNS REVIEWED
Patient's daughter calling to report patient has increased generalized swelling that has worsened over the last 2 months. Daughter states patient also in need of pre-op appointment for surgery scheduled 07/07/17.     Patient denies sob, wheezing, chest pain, palpitations, fever, chills,  dizziness, weakness, numbness/tingling, nausea, vomiting, diarrhea, diaphoresis.    Patient advised to seek evaluation in the ER if new or worsening of symptoms. Patient scheduled for pre-op 6/30/17 at 11:15 Dr. Joseluis Roberts. Patient advised of hospital clinic location. Patient agreeable and verbalized understanding.   Yes

## 2019-05-03 NOTE — ED STATDOCS - OBJECTIVE STATEMENT
64 y/o F pt with hx of ETOH abuse, vertigo, anxiety and depression presents to ED for medication ( Xanax) refill. Indicates she has had anxiety and panic attacks for years; has seen multiple specialists throughout the years. Has been off of medications for last 4 days. She last filled a 30 day supply on April 2, 2019. Has an appointment with psychiatrist May 9, 2019; is requesting medications until then. Had suicidal thoughts 2 weeks ago. No HI. Reports increased anxiety and insomnia. Has been suffering from vertigo since October; has had relief with Lexapril, Meclizine and Xanax. No further complaints at this time.

## 2019-05-03 NOTE — ED ADULT NURSE NOTE - NSIMPLEMENTINTERV_GEN_ALL_ED
Implemented All Fall Risk Interventions:  [place] to call system. Call bell, personal items and telephone within reach. Instruct patient to call for assistance. Room bathroom lighting operational. Non-slip footwear when patient is off stretcher. Physically safe environment: no spills, clutter or unnecessary equipment. Stretcher in lowest position, wheels locked, appropriate side rails in place. Provide visual cue, wrist band, yellow gown, etc. Monitor gait and stability. Monitor for mental status changes and reorient to person, place, and time. Review medications for side effects contributing to fall risk. Reinforce activity limits and safety measures with patient and family.

## 2019-05-03 NOTE — ED ADULT NURSE NOTE - CAS EDN DISCHARGE ASSESSMENT
Patient baseline mental status/Pt left in stable condition, aware rx is sent to private pharmacy x 1 week. Pt agrees to attend AA in person instead of online and will continue with [facility] appointments as set. Pt denies S/I, H/I and no psychosis observed. Left with all belongings. No distress noted/Alert and oriented to person, place and time

## 2019-05-03 NOTE — ED BEHAVIORAL HEALTH ASSESSMENT NOTE - SUMMARY
63 year old female, single, retired, domiciled with PPH significant for depression, anxiety, PTSD requesting refill of xanax. She has taken medication consistently. She has first appt with psychiatrist at [facility] next thursday and her prescription ran out 2 days ago. She is talkative, anxious, tangential but not suicidal, not delusional, has been sleeping and eating fairly. Her judgment is intact   Will send 1 week supply of medications to patients pharmacy.

## 2019-09-12 PROBLEM — F32.9 MAJOR DEPRESSIVE DISORDER, SINGLE EPISODE, UNSPECIFIED: Chronic | Status: ACTIVE | Noted: 2019-05-03

## 2019-09-12 PROBLEM — F43.10 POST-TRAUMATIC STRESS DISORDER, UNSPECIFIED: Chronic | Status: ACTIVE | Noted: 2019-05-03

## 2019-10-25 ENCOUNTER — APPOINTMENT (OUTPATIENT)
Dept: PULMONOLOGY | Facility: CLINIC | Age: 64
End: 2019-10-25

## 2019-10-25 ENCOUNTER — APPOINTMENT (OUTPATIENT)
Dept: PULMONOLOGY | Facility: CLINIC | Age: 64
End: 2019-10-25
Payer: COMMERCIAL

## 2019-10-25 VITALS
SYSTOLIC BLOOD PRESSURE: 120 MMHG | HEART RATE: 83 BPM | DIASTOLIC BLOOD PRESSURE: 80 MMHG | WEIGHT: 120 LBS | HEIGHT: 68 IN | BODY MASS INDEX: 18.19 KG/M2 | OXYGEN SATURATION: 99 %

## 2019-10-25 PROCEDURE — 94727 GAS DIL/WSHOT DETER LNG VOL: CPT

## 2019-10-25 PROCEDURE — 99205 OFFICE O/P NEW HI 60 MIN: CPT | Mod: 25

## 2019-10-25 PROCEDURE — ZZZZZ: CPT

## 2019-10-25 PROCEDURE — 94729 DIFFUSING CAPACITY: CPT

## 2019-10-25 PROCEDURE — 94060 EVALUATION OF WHEEZING: CPT

## 2019-10-25 PROCEDURE — 99406 BEHAV CHNG SMOKING 3-10 MIN: CPT

## 2019-10-25 PROCEDURE — 94618 PULMONARY STRESS TESTING: CPT

## 2019-10-25 NOTE — ASSESSMENT
[FreeTextEntry1] : 64 year old female current everyday smoker Hx PTSD, presents for evaluation, CT lung cancer screening \par \par CT lung cancer Screen\par Smoking cessation discussed \par \par Follow up 6 months or per CT chest results.

## 2019-10-25 NOTE — PHYSICAL EXAM
[General Appearance - Well Developed] : well developed [Normal Conjunctiva] : the conjunctiva exhibited no abnormalities [General Appearance - Well Nourished] : well nourished [General Appearance - In No Acute Distress] : no acute distress [Neck Appearance] : the appearance of the neck was normal [Heart Sounds] : normal S1 and S2 [Jugular Venous Distention Increased] : there was no jugular-venous distention [] : no respiratory distress [Respiration, Rhythm And Depth] : normal respiratory rhythm and effort [Auscultation Breath Sounds / Voice Sounds] : lungs were clear to auscultation bilaterally [Abnormal Walk] : normal gait [Abdomen Soft] : soft [Cyanosis, Localized] : no localized cyanosis [Non-Pitting] : non-pitting [Nail Clubbing] : no clubbing of the fingernails [Skin Color & Pigmentation] : normal skin color and pigmentation [Cranial Nerves] : cranial nerves 2-12 were intact [No Focal Deficits] : no focal deficits [Oriented To Time, Place, And Person] : oriented to person, place, and time [Erythema] : no erythema of the pharynx [FreeTextEntry1] : white patches tongue, buccal fold

## 2019-10-25 NOTE — PROCEDURE
[FreeTextEntry1] : PFT 10/25/2019 mild obstructive ventilatory defect without gas exchange abnormality and mild bronchodilator response

## 2019-10-25 NOTE — HISTORY OF PRESENT ILLNESS
[FreeTextEntry1] : Patient is a 64 year old female current everyday smoker, Hx PTSD, presents to [facility] for evaluation, CT lung cancer screening.  Patient former patient of Dr. Gregory.  She has not followed in two years.  She is following up on CT lung cancer screening exam.  She reports she smokes approximately 5-8 cigarettes daily.  She denies cough, shortness of breath, hemoptysis, weight loss or other complaints.

## 2019-12-15 ENCOUNTER — FORM ENCOUNTER (OUTPATIENT)
Age: 64
End: 2019-12-15

## 2019-12-16 ENCOUNTER — APPOINTMENT (OUTPATIENT)
Dept: ULTRASOUND IMAGING | Facility: CLINIC | Age: 64
End: 2019-12-16
Payer: COMMERCIAL

## 2019-12-16 ENCOUNTER — APPOINTMENT (OUTPATIENT)
Dept: RADIOLOGY | Facility: CLINIC | Age: 64
End: 2019-12-16
Payer: COMMERCIAL

## 2019-12-16 ENCOUNTER — OUTPATIENT (OUTPATIENT)
Dept: OUTPATIENT SERVICES | Facility: HOSPITAL | Age: 64
LOS: 1 days | End: 2019-12-16
Payer: COMMERCIAL

## 2019-12-16 ENCOUNTER — APPOINTMENT (OUTPATIENT)
Dept: MAMMOGRAPHY | Facility: CLINIC | Age: 64
End: 2019-12-16
Payer: COMMERCIAL

## 2019-12-16 ENCOUNTER — APPOINTMENT (OUTPATIENT)
Dept: CT IMAGING | Facility: CLINIC | Age: 64
End: 2019-12-16
Payer: COMMERCIAL

## 2019-12-16 VITALS — HEIGHT: 69 IN | BODY MASS INDEX: 18.07 KG/M2 | WEIGHT: 122 LBS

## 2019-12-16 DIAGNOSIS — F17.200 NICOTINE DEPENDENCE, UNSPECIFIED, UNCOMPLICATED: ICD-10-CM

## 2019-12-16 DIAGNOSIS — Z12.31 ENCOUNTER FOR SCREENING MAMMOGRAM FOR MALIGNANT NEOPLASM OF BREAST: ICD-10-CM

## 2019-12-16 DIAGNOSIS — F12.10 CANNABIS ABUSE, UNCOMPLICATED: ICD-10-CM

## 2019-12-16 PROCEDURE — 77080 DXA BONE DENSITY AXIAL: CPT | Mod: 26

## 2019-12-16 PROCEDURE — G0296 VISIT TO DETERM LDCT ELIG: CPT

## 2019-12-16 PROCEDURE — 77063 BREAST TOMOSYNTHESIS BI: CPT | Mod: 26

## 2019-12-16 PROCEDURE — G0297: CPT | Mod: 26

## 2019-12-16 PROCEDURE — 77067 SCR MAMMO BI INCL CAD: CPT | Mod: 26

## 2019-12-16 PROCEDURE — 76830 TRANSVAGINAL US NON-OB: CPT | Mod: 26

## 2019-12-16 PROCEDURE — 77067 SCR MAMMO BI INCL CAD: CPT

## 2019-12-16 PROCEDURE — 76830 TRANSVAGINAL US NON-OB: CPT

## 2019-12-16 PROCEDURE — 77063 BREAST TOMOSYNTHESIS BI: CPT

## 2019-12-16 PROCEDURE — 77080 DXA BONE DENSITY AXIAL: CPT

## 2019-12-16 PROCEDURE — G0297: CPT

## 2019-12-16 NOTE — REASON FOR VISIT
[Annual Follow-Up] : an annual follow-up visit [Low-Dose CT Screening Discussion] : low-dose CT lung cancer screening discussion [Review of Eligibility] : review of eligibility [Face-to-Face Visit] : face-to-face visit

## 2019-12-16 NOTE — HISTORY OF PRESENT ILLNESS
[TextBox_13] : Referred by Dr. Eloisa Maciel.\par \par Ms. PRUITT is a 64 year old female with no significant cardiac or pulmonary medical history.\par \par She was seen in the office today for review of eligibility for, as well as, Low-Dose CT lung cancer screening.  Reviewed and confirmed that the patient meets screening eligibility criteria:\par \par 64 years old \par \par Smoking Status: Current Smoker\par \par Number of pack(s) per day: 15 cig daily x 36 years, 6-8 cig daily x 10 years\par Number of years smoked: 46\par Number of pack years smokin.5\par \par Ms. PRUITT denies any symptoms of lung cancer, including new cough, change in cough, hemoptysis, and unintentional weight loss.\par \par Ms. PRUITT denies any personal history of lung cancer.  No lung cancer in a first degree relative.  Denies any history of lung disease or any history of occupational exposures.\par

## 2019-12-16 NOTE — PLAN
[FreeTextEntry1] : - Low Dose CT chest for lung cancer screening.\par \par - Encouraged smoking cessation\par \par

## 2019-12-16 NOTE — ASSESSMENT
[Discussed Risks and Advised to Quit Smoking] : Discussed risks and advised to quit smoking [Not Ready] : Patient is not ready for cessation intervention

## 2019-12-16 NOTE — DATA REVIEWED
[Lung Cancer Screening] : Patient underwent lung cancer screening [1] : 1 [2] : 2 [TextBox_27] : 10/17 [TextBox_12] : 10/16

## 2019-12-19 ENCOUNTER — APPOINTMENT (OUTPATIENT)
Dept: RHEUMATOLOGY | Facility: CLINIC | Age: 64
End: 2019-12-19

## 2020-02-10 NOTE — BEHAVIORAL HEALTH ASSESSMENT NOTE - ORIENTED TO SITUATION
Refill encounter per protocol for finasteride   Last office visit 3/25/19  Last refill  1/30/20  Last lab   Upcoming appt 3/25/20    
Yes

## 2020-04-12 NOTE — DIETITIAN INITIAL EVALUATION ADULT. - OTHER INFO
Suprapubic catheter noted. MD aware. Urine specimens sent as ordered. Will continue to monitor Seeing pt for length of stay. Pt's current intake seems poor. Didn't appear to eat much at breakfast. Claims she can't eat large portions, needs to eat in pieces. Recent flow sheets record 20-50% intake. Pt claims UBW to be 119lbs. Although classifies as underweight, reports to have been this weight for 8-10 years. Highest weight ever was 145lbs, but that was a very long time ago (25 years). NFPE performed with pt consent. Moderate muscle loss detected in temporals and clavicles. Pt does cooking and shopping at home. Denies difficulty chewing/swallowing. Sometimes experiences nausea and constipation. Last BM recorded 10/14. Denies vomiting and diarrhea.

## 2020-04-28 ENCOUNTER — APPOINTMENT (OUTPATIENT)
Dept: PULMONOLOGY | Facility: CLINIC | Age: 65
End: 2020-04-28

## 2020-07-21 ENCOUNTER — APPOINTMENT (OUTPATIENT)
Dept: PULMONOLOGY | Facility: CLINIC | Age: 65
End: 2020-07-21

## 2020-07-31 NOTE — PROGRESS NOTE ADULT - SUBJECTIVE AND OBJECTIVE BOX
Head,  normocephalic,  atraumatic,  Face,  Face within normal limits,  Ears,  External ears within normal limits,  Nose/Nasopharynx,  Mouth and Throat, wearing mask  CHIEF COMPLAINT:Patient is a 63y old  Female who presents with a chief complaint of left flank pain diffuse aches and pains (07 Oct 2018 18:33)    	        PAST MEDICAL & SURGICAL HISTORY:  Anxiety  No significant past surgical history          REVIEW OF SYSTEMS:  CONSTITUTIONAL: weak  EYES: No eye pain, visual disturbances, or discharge  NECK: No pain or stiffness  RESPIRATORY: No cough, wheezing, chills or hemoptysis; No Shortness of Breath  CARDIOVASCULAR: No chest pain, palpitations, passing out,  or leg swelling  GASTROINTESTINAL: No abdominal or epigastric pain. No nausea, vomiting, or hematemesis; No diarrhea or constipation. No melena or hematochezia.  GENITOURINARY: No dysuria, frequency, hematuria, or incontinence  NEUROLOGICAL: No headaches, memory loss, loss of strength, numbness, or tremors  MUSCULOSKELETAL: No joint pain or swelling; No muscle, back, or extremity pain    Medications:  MEDICATIONS  (STANDING):  cefTRIAXone   IVPB 1 Gram(s) IV Intermittent every 12 hours  colchicine 0.6 milliGRAM(s) Oral two times a day  escitalopram 30 milliGRAM(s) Oral daily  heparin  Injectable 5000 Unit(s) SubCutaneous every 12 hours  ibuprofen  Tablet. 600 milliGRAM(s) Oral three times a day  influenza   Vaccine 0.5 milliLiter(s) IntraMuscular once  mirtazapine 7.5 milliGRAM(s) Oral at bedtime  sodium chloride 0.9%. 1000 milliLiter(s) (100 mL/Hr) IV Continuous <Continuous>    MEDICATIONS  (PRN):  meclizine 12.5 milliGRAM(s) Oral every 8 hours PRN Dizziness    	    PHYSICAL EXAM:  T(C): 36.4 (10-08-18 @ 04:29), Max: 36.7 (10-07-18 @ 20:50)  HR: 52 (10-08-18 @ 04:29) (52 - 54)  BP: 149/70 (10-08-18 @ 04:29) (133/66 - 149/70)  RR: 18 (10-08-18 @ 04:29) (18 - 18)  SpO2: 99% (10-08-18 @ 04:29) (98% - 99%)  Wt(kg): --  I&O's Summary    07 Oct 2018 07:01  -  08 Oct 2018 07:00  --------------------------------------------------------  IN: 3240 mL / OUT: 0 mL / NET: 3240 mL        Appearance: Normal	  HEENT:   Normal oral mucosa, PERRL, EOMI	  Lymphatic: No lymphadenopathy  Cardiovascular: Normal S1 S2, No JVD, No murmurs, No edema  Respiratory: Lungs clear to auscultation	  Psychiatry: A & O x 3, depressed   Gastrointestinal:  Soft, Non-tender, + BS	  Skin: No rashes, No ecchymoses, No cyanosis	  Neurologic: Non-focal  Extremities: Normal range of motion, No clubbing, cyanosis or edema  Vascular: Peripheral pulses palpable 2+ bilaterally    TELEMETRY: 	    ECG:  	  RADIOLOGY:  OTHER: 	  	  LABS:	 	    CARDIAC MARKERS:                                12.4   7.81  )-----------( 426      ( 08 Oct 2018 08:06 )             38.7     10-08    144  |  107  |  15  ----------------------------<  90  4.2   |  28  |  0.88    Ca    9.0      08 Oct 2018 06:39      proBNP:   Lipid Profile:   HgA1c:   TSH:

## 2020-10-07 ENCOUNTER — NON-APPOINTMENT (OUTPATIENT)
Age: 65
End: 2020-10-07

## 2020-10-07 ENCOUNTER — APPOINTMENT (OUTPATIENT)
Dept: FAMILY MEDICINE | Facility: CLINIC | Age: 65
End: 2020-10-07
Payer: MEDICAID

## 2020-10-07 VITALS
HEART RATE: 102 BPM | BODY MASS INDEX: 20.44 KG/M2 | SYSTOLIC BLOOD PRESSURE: 110 MMHG | DIASTOLIC BLOOD PRESSURE: 70 MMHG | WEIGHT: 138 LBS | HEIGHT: 69 IN

## 2020-10-07 DIAGNOSIS — R42 DIZZINESS AND GIDDINESS: ICD-10-CM

## 2020-10-07 DIAGNOSIS — Z23 ENCOUNTER FOR IMMUNIZATION: ICD-10-CM

## 2020-10-07 DIAGNOSIS — Z00.00 ENCOUNTER FOR GENERAL ADULT MEDICAL EXAMINATION W/OUT ABNORMAL FINDINGS: ICD-10-CM

## 2020-10-07 DIAGNOSIS — M81.0 AGE-RELATED OSTEOPOROSIS W/OUT CURRENT PATHOLOGICAL FRACTURE: ICD-10-CM

## 2020-10-07 DIAGNOSIS — Z86.19 PERSONAL HISTORY OF OTHER INFECTIOUS AND PARASITIC DISEASES: ICD-10-CM

## 2020-10-07 PROCEDURE — 93000 ELECTROCARDIOGRAM COMPLETE: CPT

## 2020-10-07 PROCEDURE — 36415 COLL VENOUS BLD VENIPUNCTURE: CPT

## 2020-10-07 PROCEDURE — 90471 IMMUNIZATION ADMIN: CPT

## 2020-10-07 PROCEDURE — 90686 IIV4 VACC NO PRSV 0.5 ML IM: CPT

## 2020-10-07 PROCEDURE — 99386 PREV VISIT NEW AGE 40-64: CPT | Mod: 25

## 2020-10-07 RX ORDER — TRAZODONE HYDROCHLORIDE 300 MG/1
TABLET ORAL
Refills: 0 | Status: ACTIVE | COMMUNITY

## 2020-10-07 RX ORDER — UBIDECARENONE/VIT E ACET 100MG-5
CAPSULE ORAL
Refills: 0 | Status: ACTIVE | COMMUNITY

## 2020-10-07 RX ORDER — LEVOCARNITINE TARTRATE 500 MG
CAPSULE ORAL
Refills: 0 | Status: ACTIVE | COMMUNITY

## 2020-10-07 RX ORDER — HYDROXYZINE PAMOATE 25 MG/5 ML
SUSPENSION, ORAL (FINAL DOSE FORM) ORAL
Refills: 0 | Status: ACTIVE | COMMUNITY

## 2020-10-07 RX ORDER — QUETIAPINE 400 MG/1
TABLET, FILM COATED ORAL
Refills: 0 | Status: ACTIVE | COMMUNITY

## 2020-10-07 RX ORDER — ESCITALOPRAM OXALATE 5 MG/1
TABLET, FILM COATED ORAL
Refills: 0 | Status: ACTIVE | COMMUNITY

## 2020-10-07 RX ORDER — CITALOPRAM HYDROBROMIDE 40 MG/1
40 TABLET, FILM COATED ORAL DAILY
Qty: 30 | Refills: 2 | Status: DISCONTINUED | COMMUNITY
Start: 2018-08-30 | End: 2020-10-07

## 2020-10-07 RX ORDER — ALPRAZOLAM 2 MG/1
TABLET ORAL
Refills: 0 | Status: ACTIVE | COMMUNITY

## 2020-10-07 RX ORDER — GABAPENTIN 600 MG/1
600 TABLET, COATED ORAL
Qty: 30 | Refills: 2 | Status: DISCONTINUED | COMMUNITY
Start: 2018-08-30 | End: 2020-10-07

## 2020-10-07 RX ORDER — GABAPENTIN 100 MG/1
CAPSULE ORAL
Refills: 0 | Status: ACTIVE | COMMUNITY

## 2020-10-07 RX ORDER — NYSTATIN 100000 [USP'U]/ML
100000 SUSPENSION ORAL 4 TIMES DAILY
Qty: 200 | Refills: 0 | Status: DISCONTINUED | COMMUNITY
Start: 2019-10-25 | End: 2020-10-07

## 2020-10-10 PROBLEM — Z23 INFLUENZA VACCINE NEEDED: Status: ACTIVE | Noted: 2020-10-07

## 2020-10-10 NOTE — REVIEW OF SYSTEMS
[Joint Pain] : joint pain [Back Pain] : back pain [Memory Loss] : memory loss [Anxiety] : anxiety [Depression] : depression [Negative] : Heme/Lymph [Muscle Weakness] : no muscle weakness [Headache] : no headache [Dizziness] : no dizziness [Fainting] : no fainting [Suicidal] : not suicidal

## 2020-10-10 NOTE — PHYSICAL EXAM
[No Acute Distress] : no acute distress [Well Nourished] : well nourished [Well-Appearing] : well-appearing [Normal Sclera/Conjunctiva] : normal sclera/conjunctiva [PERRL] : pupils equal round and reactive to light [Normal Outer Ear/Nose] : the outer ears and nose were normal in appearance [Normal Oropharynx] : the oropharynx was normal [Normal TMs] : both tympanic membranes were normal [No Lymphadenopathy] : no lymphadenopathy [Supple] : supple [No Respiratory Distress] : no respiratory distress  [No Accessory Muscle Use] : no accessory muscle use [Clear to Auscultation] : lungs were clear to auscultation bilaterally [Normal Rate] : normal rate  [Regular Rhythm] : with a regular rhythm [Normal S1, S2] : normal S1 and S2 [Soft] : abdomen soft [Non Tender] : non-tender [Normal Bowel Sounds] : normal bowel sounds [No Joint Swelling] : no joint swelling [Grossly Normal Strength/Tone] : grossly normal strength/tone [No Rash] : no rash [Coordination Grossly Intact] : coordination grossly intact [No Focal Deficits] : no focal deficits [Normal Gait] : normal gait [Deep Tendon Reflexes (DTR)] : deep tendon reflexes were 2+ and symmetric [Speech Grossly Normal] : speech grossly normal [de-identified] : APPEARS AGITATED AND ANXIOUS; TROUBLE FOCUSING AT TIMES.

## 2020-10-10 NOTE — HISTORY OF PRESENT ILLNESS
[FreeTextEntry1] : ESTABLISH CARE. [de-identified] : MS. PRUITT IS A PLESANT 64 YO PRESENTING TO [facility].  SHE IS UNFORTUNATELY A POOR HISTORIAN AND STATES THAT SHE HAS HAD "CHRONIC MEMORY ISSUES" FOR YEARS.  HAS HAD EVALUATION AND MULTIPLE SPECIALIST EVALUATION.  PER WHAT I COULD TELL FROM CHART AND MS. PRUITT REPORT, SHE HAS A HISTORY OF ANXIETY, DEPRESSION INSOMNIA, PTSD AND OSTEOPOROSIS.  DENIES BIPOLAR.  ROUTINELY SEEING PSYCHIATRIST AND MEDICATION ADJUSTED.  HAS SEEN RHEUMATOLOGY FOR OSTEOPOROSIS AND JOINT PAINS.  + TOBACCO USE.  IS NOT INTERESTED IN QUITTING AT THIS TIME AND DOES NOT WISH TO DISCUSS.\par \par SPECIALISTS: \par CARDIOLOGY: DR. LAY CAMPO - [facility]\par PULMONOLOGY: DR. HAINES\par RHEUMATOLOGY: DR. BRAVO - [facility] [place]\par GYN: DR. SPIVEY\par DERMATOLOGY: NEW PROVIDER; RECENTLY SEEN AND HAD SCREENING\par EYE PROVIDER; LAST SEEN THREE YEARS AGO\par PSYCHIATRIST: DR. SUSIE AMAYA ([facility]) AND THERAPIST

## 2020-10-10 NOTE — PLAN
[FreeTextEntry1] : PLANS TO HAVE SHINGRIX\par FLU VACCINE GIVEN AND TOLERATED WELL\par VISION SCREENING\par CHECK ROUTINE LAB WORK\par EKG: NSR AT 84 BPM, NO VOLTAGE; NO ACUTE ST-T WAVE CHANGES\par NEED MAMMOGRAM RESULTS\par NEED ALL CONSULTANT NOTES FOR REVIEW FOR MORE CLARITY ON MEDICAL HISTORY AND PRIOR TESTING\par TO FOLLOW-UP AFTER\par SUPPORT GIVEN\par ROUTINELY SEEING PSYCHIATRIST\par DUE FOR COLONOSCOPY\par ROUTINE GYN EXAMS\par HAS FAMILY LOCALLY\par CALL WITH ANY QUESTIONS, CONCERNS OR CHANGES \par

## 2020-10-10 NOTE — HEALTH RISK ASSESSMENT
[Fair] : ~his/her~ current health as fair  [Poor] : ~his/her~ mood as  poor [No falls in past year] : Patient reported no falls in the past year [Patient reported colonoscopy was normal] : Patient reported colonoscopy was normal [] : Yes [30 or more] : 30 or more [No] : No [Yes] : In the past 12 months have you used drugs other than those required for medical reasons? Yes [HIV test declined] : HIV test declined [Hepatitis C test declined] : Hepatitis C test declined [Learning/Retaining New Information] : difficulty learning/retaining new information [Handling Complex Tasks] : difficulty handling complex tasks [None] : None [Alone] : lives alone [Unemployed] : unemployed [Single] : single [# Of Children ___] : has [unfilled] children [Reports changes in dental health] : Reports changes in dental health [Smoke Detector] : smoke detector [Carbon Monoxide Detector] : carbon monoxide detector [Safety elements used in home] : safety elements used in home [Seat Belt] :  uses seat belt [de-identified] : 1/2 PPD [de-identified] : QUIT 16 YEARS AGO [de-identified] : MARIJUANA [de-identified] : NOT ROUTINELY EXERCISING [de-identified] : HEALTHY DIET [Reports changes in hearing] : Reports no changes in hearing [MammogramDate] : 12/19 [MammogramComments] : GSB [PapSmearDate] : 11/19 [PapSmearComments] : APPROXIMATELY [BoneDensityDate] : 12/19 [ColonoscopyDate] : 2015 [ColonoscopyComments] : LOCALLY; CAN'T RECALL NAME; ADVISED 5 YEAR FOLLOW-UP [de-identified] : GLASSES FOR READING [de-identified] : HAS UPCOMING APPOINTMENT WITH DENTIST

## 2020-10-14 DIAGNOSIS — E78.00 PURE HYPERCHOLESTEROLEMIA, UNSPECIFIED: ICD-10-CM

## 2020-10-14 LAB
25(OH)D3 SERPL-MCNC: 27.8 NG/ML
ALBUMIN SERPL ELPH-MCNC: 4.5 G/DL
ALP BLD-CCNC: 68 U/L
ALT SERPL-CCNC: 5 U/L
ANION GAP SERPL CALC-SCNC: 13 MMOL/L
AST SERPL-CCNC: 10 U/L
BASOPHILS # BLD AUTO: 0.07 K/UL
BASOPHILS NFR BLD AUTO: 0.8 %
BILIRUB SERPL-MCNC: 0.2 MG/DL
BUN SERPL-MCNC: 9 MG/DL
CALCIUM SERPL-MCNC: 9.7 MG/DL
CHLORIDE SERPL-SCNC: 102 MMOL/L
CHOLEST SERPL-MCNC: 262 MG/DL
CHOLEST/HDLC SERPL: 5.6 RATIO
CO2 SERPL-SCNC: 26 MMOL/L
CREAT SERPL-MCNC: 1.01 MG/DL
EOSINOPHIL # BLD AUTO: 0.1 K/UL
EOSINOPHIL NFR BLD AUTO: 1.2 %
GLUCOSE SERPL-MCNC: 104 MG/DL
HCT VFR BLD CALC: 45.6 %
HDLC SERPL-MCNC: 47 MG/DL
HGB BLD-MCNC: 14.3 G/DL
IMM GRANULOCYTES NFR BLD AUTO: 0.2 %
LDLC SERPL CALC-MCNC: 185 MG/DL
LYMPHOCYTES # BLD AUTO: 2.02 K/UL
LYMPHOCYTES NFR BLD AUTO: 24.5 %
MAN DIFF?: NORMAL
MCHC RBC-ENTMCNC: 29.5 PG
MCHC RBC-ENTMCNC: 31.4 GM/DL
MCV RBC AUTO: 94 FL
MONOCYTES # BLD AUTO: 0.55 K/UL
MONOCYTES NFR BLD AUTO: 6.7 %
NEUTROPHILS # BLD AUTO: 5.48 K/UL
NEUTROPHILS NFR BLD AUTO: 66.6 %
PLATELET # BLD AUTO: 255 K/UL
POTASSIUM SERPL-SCNC: 4.1 MMOL/L
PROT SERPL-MCNC: 6.7 G/DL
RBC # BLD: 4.85 M/UL
RBC # FLD: 15.2 %
SODIUM SERPL-SCNC: 140 MMOL/L
T4 FREE SERPL-MCNC: 1.1 NG/DL
TRIGL SERPL-MCNC: 149 MG/DL
TSH SERPL-ACNC: 2.17 UIU/ML
WBC # FLD AUTO: 8.24 K/UL

## 2020-11-24 DIAGNOSIS — Z01.812 ENCOUNTER FOR PREPROCEDURAL LABORATORY EXAMINATION: ICD-10-CM

## 2020-12-05 ENCOUNTER — APPOINTMENT (OUTPATIENT)
Dept: DISASTER EMERGENCY | Facility: CLINIC | Age: 65
End: 2020-12-05

## 2020-12-07 LAB — SARS-COV-2 N GENE NPH QL NAA+PROBE: NOT DETECTED

## 2020-12-09 ENCOUNTER — APPOINTMENT (OUTPATIENT)
Dept: PULMONOLOGY | Facility: CLINIC | Age: 65
End: 2020-12-09
Payer: MEDICAID

## 2020-12-09 VITALS
SYSTOLIC BLOOD PRESSURE: 110 MMHG | RESPIRATION RATE: 16 BRPM | BODY MASS INDEX: 20.16 KG/M2 | HEIGHT: 68 IN | WEIGHT: 133 LBS | OXYGEN SATURATION: 98 % | DIASTOLIC BLOOD PRESSURE: 70 MMHG | TEMPERATURE: 97 F | HEART RATE: 89 BPM

## 2020-12-09 DIAGNOSIS — F12.10 CANNABIS ABUSE, UNCOMPLICATED: ICD-10-CM

## 2020-12-09 DIAGNOSIS — F17.200 NICOTINE DEPENDENCE, UNSPECIFIED, UNCOMPLICATED: ICD-10-CM

## 2020-12-09 PROCEDURE — 94799 UNLISTED PULMONARY SVC/PX: CPT

## 2020-12-09 PROCEDURE — 99072 ADDL SUPL MATRL&STAF TM PHE: CPT

## 2020-12-09 PROCEDURE — 99214 OFFICE O/P EST MOD 30 MIN: CPT | Mod: 25

## 2020-12-09 PROCEDURE — 94010 BREATHING CAPACITY TEST: CPT

## 2020-12-09 PROCEDURE — G0296 VISIT TO DETERM LDCT ELIG: CPT

## 2020-12-09 PROCEDURE — 99406 BEHAV CHNG SMOKING 3-10 MIN: CPT

## 2020-12-09 PROCEDURE — 94729 DIFFUSING CAPACITY: CPT

## 2020-12-09 PROCEDURE — 94727 GAS DIL/WSHOT DETER LNG VOL: CPT

## 2020-12-09 NOTE — PHYSICAL EXAM
Progress Notes by Kenan Delacruz MD at 11/26/18 09:01 AM     Author:  Kenan Delacruz MD Service:  (none) Author Type:  Physician     Filed:  11/29/18 01:37 PM Encounter Date:  11/26/2018 Status:  Signed     :  Kenan Delacruz MD (Physician)            Established[MR1.1M] patient  11/26/2018    Diagnosis:[MR1.1T]  Non-small cell lung cancer  Stage II a, T1b, N1 [clinically stage][MR1.1C]    HPI/interval history:[MR1.1T]  This is a 73 year old female with non-small cell lung cancer.  She has past medical history significant for hypertension, diabetes mellitus who was presented with chronic cough during this evaluation she had chest x-ray done which showed left hilar mass.  That leads to the CT scan of the chest on 9/18/2018 which showed 2.5 x 2.3 cm left perihilar mass.  She had bronchoscopy done on 9/21/2018 which showed adenocarcinoma, EGFR positive, exon 21 mutation.[MR1.1C]      She is here for follow-up.  She is seeing ENT for vocal cord paralysis.  She is going to have injection done on 6 December.  She did not have any shortness of breath.  She was feeling some tightness but she is feeling better now.  No nausea or vomiting.  No fever or chills.[MR1.2M]    Cancer HX:   9/18/2018--CT scan of the chest shows a 2.5 x 2.3 cm left hilar mass.  9/21/2018--bronchoscopy showed adenocarcinoma, EGFR positive, exam 21 mutation  10/15/2018--mediastinoscopy done by Dr. Ethan omalley in [facility], path report S- showed 3 lymph nodes 1 from his station #7, 4R lymph node biopsy [lower paratracheal], 4L lymph node [lower paratracheal] were negative for carcinoma.  10/30/2018--status post left pneumonectomy.  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 Her smoking use included Cigarettes. She has a 30.00 pack-year smoking history. She has never used smokeless tobacco. She reports that she does not drink alcohol or use illicit drugs.     Allergies:  Norco [hydrocodone-acetaminophen]; Clindamycin; Penicillins; and Simvastatin     Current Outpatient Prescriptions     Medication  Sig   • metoprolol tartrate (LOPRESSOR) 25 MG tablet    • benzonatate (TESSALON) 100 MG Cap TAKE ONE CAPSULE BY MOUTH THREE TIMES DAILY AS NEEDED FOR COUGH    • acetaminophen-codeine (TYLENOL #3) 300-30 MG per tablet    • metformin (GLUCOPHAGE) 500 MG tablet TAKE TWO TABLETS BY MOUTH TWICE DAILY    • pravastatin (PRAVACHOL) 10 MG tablet TAKE ONE TABLET BY MOUTH ONE TIME DAILY    • glipiZIDE (GLUCOTROL) 2.5 MG 24 hr tablet take 1 tablet by mouth every morning   • furosemide (LASIX) 20 MG tablet TAKE ONE TABLET BY MOUTH ONE TIME DAILY    • Liraglutide (VICTOZA) 18 MG/3ML SOPN Inject 1.2 mg into the skin daily.   • losartan (COZAAR) 50 MG tablet Take 1 Tab by mouth daily. To replace lisinopril   • amlodipine (NORVASC) 5 MG tablet Take 1 Tab by mouth daily.   • Insulin Pen Needle (PEN NEEDLES) 32G X 4 MM MISC 1 Each daily.   • Blood Glucose Monitoring Suppl KIT Test 2X daily Dx DM TYPE II-UNCOMPL E11.9   • calcium citrate-vitamin D (CITRACAL+D) 315-200 MG-UNIT per tablet Take 1 Tab by mouth 2 (two) times daily.   • DIABETIC TESTING  STRIPS Accu check drumTest 2X daily   • Lancet Devices (ACCU-CHEK SOFTCLIX LANCET DEV) MISC Test once twice daily   • Blood Glucose Monitoring Suppl (ACCU-CHEK CADY SMARTVIEW) W/DEVICE KIT 10 mcg 2 (two) times daily.   • Omega-3 Fatty Acids (FISH OIL CONCENTRATE OR) Take  by mouth.   • aspirin 81 MG tablet Take 81 mg by mouth daily.       Performance Status: ECOG[MR1.1T] 0[MR1.2M]    Distress Screening:  Scale Rating of 0=No Distress and 10=Extreme Distress  Patient self-reported distress rating of:[MR1.1T] 0[MR1.2M]    Over the past 2 weeks, has patient felt down, depressed or hopeless?[MR1.1T] No[MR1.2M]  Over the past 2 weeks, has patient felt little interest or pleasure in doing things?[MR1.1T] No[MR1.2M]    On the basis of the above screen, the following is initiated:[MR1.1T]  Normal screen, continue to monitor for symptoms over time[MR1.2M]    Physical Exam:  /56  Pulse 55  Temp 98.7 °F (37.1 °C) (Tympanic)  Resp 18  Ht 5' (1.524 m)  Wt 154 lb 6.9 oz (70.1 kg)  SpO2 96%  BMI 30.16 kg/m2     General Appearance: Alert and oriented, no acute distress.  HEENT: Head - normocephalic, Sclera - anicteric, EOMI. No pharyngeal erythema or exudates.  Lymphatic System: No palpable nodes in neck axillae or groin.  Neck:  Thyroid symmetric, normal size; trachea midline, no venous distention.  CV: S1 normal, S2 normal; no murmurs, clicks or gallops. No peripheral edema.  Respiratory: Lungs[MR1.1T] decreased breath sounds in the left lung field[MR1.2M]  Back: Symmetric, ROM normal; no CVA tenderness.   GI: Abdomen soft, non-tender, no distended. No masses or organomegaly. Bowel sounds were heard in all 4 quadrants   Neurological: Reflexes are 2+ bilaterally and No focal neuro deficit.  Skin: Skin color, texture, turgor normal. No rashes or lesions.    Labs reviewed:[MR1.1T]  Lab Results      Component  Value Date    HGB 13.7 10/26/2018    HCT 42.1 10/26/2018    MCV 93.6 10/26/2018    WBC 7.9 10/26/2018    ANC 4.8  10/26/2018    ALC 2.0 10/26/2018     10/26/2018[MR1.2T]          Pathology:     Imaging:[MR1.1T]   none[MR1.2M]     Assessment/Plan:[MR1.1T]    Non-small cell lung cancer of left lung [adenocarcinoma, EGFR positive, exon 21 mutation] Stage II a, T1b, N1 [clinically stage]  She has had a scan done which shows 2.7 cm left lower lobe mass with left hilar lymph node which is 15 mm.  She had mediastinoscopy done which showed 3 lymph nodes negative for carcinoma.    She had surgery done on 10/30/2018.  She is diagnosed with a stage II non-small cell lung cancer, pT1c, pN1, 3/14 lymph nodes positive, at level 11 there are 2 lymph nodes [intralobar], 1 left hilar lymph node positive.  I discussed with her about adjuvant chemotherapy.[MR1.1C]  Still waiting for the Tempus profile.  She is going to see[MR1.2M] [MR1.2C] this Wednesday.  I am thinking about giving her adjuvant therapy with carboplatin 4 cycles.[MR1.2M]      Electronically Signed by:    Kenan Delacruz MD , 11/26/2018[MR1.1T]      Revision History        User Key Date/Time User Provider Type Action    > MR1.2 11/29/18 01:37 PM Kenan Delacruz MD Physician Sign     MR1.1 11/26/18 09:01 AM Kenan Delacruz MD Physician     C - Copied, M - Manual, T - Template             [General Appearance - Well Developed] : well developed [General Appearance - Well Nourished] : well nourished [General Appearance - In No Acute Distress] : no acute distress [Normal Conjunctiva] : the conjunctiva exhibited no abnormalities [Neck Appearance] : the appearance of the neck was normal [Jugular Venous Distention Increased] : there was no jugular-venous distention [Heart Sounds] : normal S1 and S2 [] : no respiratory distress [Respiration, Rhythm And Depth] : normal respiratory rhythm and effort [Auscultation Breath Sounds / Voice Sounds] : lungs were clear to auscultation bilaterally [Abdomen Soft] : soft [Abnormal Walk] : normal gait [Nail Clubbing] : no clubbing of the fingernails [Cyanosis, Localized] : no localized cyanosis [Non-Pitting] : non-pitting [Skin Color & Pigmentation] : normal skin color and pigmentation [Cranial Nerves] : cranial nerves 2-12 were intact [No Focal Deficits] : no focal deficits [Oriented To Time, Place, And Person] : oriented to person, place, and time [Erythema] : no erythema of the pharynx [FreeTextEntry1] : white patches tongue, buccal fold

## 2020-12-09 NOTE — HISTORY OF PRESENT ILLNESS
[Never] : never [Current] : current [TextBox_4] : Patient is a 65 year old female current everyday smoker, Hx PTSD, presents to [facility] for follow up. She reports she is smoking more due to anxiety and COVID.  She is under care provider for anxiety and depression. She denies increased shortness of breath, weight loss, cough or hemoptysis.  She is here for follow up.

## 2020-12-09 NOTE — PHYSICAL EXAM
[General Appearance - Well Developed] : well developed [General Appearance - Well Nourished] : well nourished [General Appearance - In No Acute Distress] : no acute distress [Normal Conjunctiva] : the conjunctiva exhibited no abnormalities [Neck Appearance] : the appearance of the neck was normal [Jugular Venous Distention Increased] : there was no jugular-venous distention [Heart Sounds] : normal S1 and S2 [] : no respiratory distress [Respiration, Rhythm And Depth] : normal respiratory rhythm and effort [Auscultation Breath Sounds / Voice Sounds] : lungs were clear to auscultation bilaterally [Abdomen Soft] : soft [Abnormal Walk] : normal gait [Nail Clubbing] : no clubbing of the fingernails [Cyanosis, Localized] : no localized cyanosis [Non-Pitting] : non-pitting [Skin Color & Pigmentation] : normal skin color and pigmentation [Cranial Nerves] : cranial nerves 2-12 were intact [No Focal Deficits] : no focal deficits [Oriented To Time, Place, And Person] : oriented to person, place, and time [Erythema] : no erythema of the pharynx [FreeTextEntry1] : white patches tongue, buccal fold

## 2020-12-09 NOTE — PROCEDURE
[FreeTextEntry1] : PFT 10/25/2019 mild obstructive ventilatory defect without gas exchange abnormality and mild bronchodilator response\par \par CT lung Cancer Screening 12/16/2019 No nodules noted \par \par PFT 12/9/20 personally reviewed mild obstructive ventilatory defect without gas exchange abnormality

## 2020-12-09 NOTE — ASSESSMENT
[FreeTextEntry1] : 65 year old female current everyday smoker Hx PTSD, presents for follow up nicotine habituation, , CT lung cancer screening \par \par CT lung cancer Screen\par Smoking cessation discussed \par \par Follow up pending CT chest results.

## 2020-12-09 NOTE — COUNSELING
[Risk of tobacco use and health benefits of smoking cessation discussed] : Risk of tobacco use and health benefits of smoking cessation discussed [Tobacco Use Cessation Intermediate Greater Than 3 Minutes Up to 10 Minutes] : Tobacco Use Cessation Intermediate Greater Than 3 Minutes Up to 10 Minutes [None] : None [Good understanding] : Patient has a good understanding of lifestyle changes and steps needed to achieve self management goal [ - Annual Lung Cancer Screening/Share Decision Making Discussion] : Annual Lung Cancer Screening/Share Decision Making Discussion. (I have advised this patient to have a Low Dose CT (LDCT) scan of the lungs and have discussed the following with the patient in a shared decision making discussion:   Benefits of Detection and Early Treatment: There is adequate evidence that annual screening for lung cancer with LDCT in a population of high-risk persons can prevent a substantial number of lung cancer–related deaths. The magnitude of benefit depends on the individual patient's risk for lung cancer, as those who are at highest risk are most likely to benefit. Screening cannot prevent most lung cancer–related deaths, and does not replace smoking cessation. Harms of Detection and Early Intervention and Treatment: The harms associated with LDCT screening include false-negative and false-positive results, incidental findings, over diagnosis, and radiation exposure. False-positive LDCT results occur in a substantial proportion of screened persons; 95% of all positive results do not lead to a diagnosis of cancer. In a high-quality screening program, further imaging can resolve most false-positive results; however, some patients may require invasive procedures. Radiation harms, including cancer resulting from cumulative exposure to radiation, vary depending on the age at the start of screening; the number of scans received; and the person's exposure to other sources of radiation, particularly other medical imaging.) [Willing to Quit Smoking] : Not willing to quit smoking

## 2020-12-09 NOTE — HISTORY OF PRESENT ILLNESS
[Never] : never [Current] : current [TextBox_4] : Patient is a 65 year old female current everyday smoker, Hx PTSD, presents to [facility] for follow up. She reports she is smoking more due to anxiety and COVID.  She is under care provider for anxiety and depression. She denies increased shortness of breath, weight loss, cough or hemoptysis.  She is here for follow up.

## 2020-12-17 ENCOUNTER — NON-APPOINTMENT (OUTPATIENT)
Age: 65
End: 2020-12-17

## 2020-12-17 VITALS — WEIGHT: 125 LBS | HEIGHT: 68 IN | BODY MASS INDEX: 18.94 KG/M2

## 2020-12-17 DIAGNOSIS — F17.210 NICOTINE DEPENDENCE, CIGARETTES, UNCOMPLICATED: ICD-10-CM

## 2020-12-17 NOTE — HISTORY OF PRESENT ILLNESS
[TextBox_13] : Referred by Dr. Eloisa Maciel.\par \par Ms. PRUITT is a 65 year old female with no significant cardiac or pulmonary medical history.\par \par She was seen in the office today for review of eligibility for, as well as, Low-Dose CT lung cancer screening. Reviewed and confirmed that the patient meets screening eligibility criteria:\par \par 64 years old \par \par Smoking Status: Current Smoker\par \par Number of pack(s) per day: 15 cig daily x 36 years, 6-8 cig daily x 11 years\par Number of years smoked: 47\par Number of pack years smokin\par \par Ms. PRUITT denies any symptoms of lung cancer, including new cough, change in cough, hemoptysis, and unintentional weight loss.\par \par Ms. PRUITT denies any personal history of lung cancer. No lung cancer in a first degree relative. Denies any history of lung disease or any history of occupational exposures.

## 2020-12-17 NOTE — DATA REVIEWED
[Lung Cancer Screening] : Patient underwent lung cancer screening [2] : 2 [1] : 1 [TextBox_12] : 10/16 [TextBox_27] : 10/17 [TextBox_42] : 12/19

## 2020-12-22 LAB — HEMOCCULT STL QL IA: NEGATIVE

## 2020-12-30 ENCOUNTER — OUTPATIENT (OUTPATIENT)
Dept: OUTPATIENT SERVICES | Facility: HOSPITAL | Age: 65
LOS: 1 days | End: 2020-12-30
Payer: COMMERCIAL

## 2020-12-30 ENCOUNTER — RESULT REVIEW (OUTPATIENT)
Age: 65
End: 2020-12-30

## 2020-12-30 ENCOUNTER — LABORATORY RESULT (OUTPATIENT)
Age: 65
End: 2020-12-30

## 2020-12-30 ENCOUNTER — APPOINTMENT (OUTPATIENT)
Dept: CT IMAGING | Facility: CLINIC | Age: 65
End: 2020-12-30
Payer: MEDICAID

## 2020-12-30 DIAGNOSIS — Z00.8 ENCOUNTER FOR OTHER GENERAL EXAMINATION: ICD-10-CM

## 2020-12-30 DIAGNOSIS — F17.210 NICOTINE DEPENDENCE, CIGARETTES, UNCOMPLICATED: ICD-10-CM

## 2020-12-30 PROCEDURE — G0297: CPT | Mod: 26

## 2020-12-30 PROCEDURE — 71271 CT THORAX LUNG CANCER SCR C-: CPT

## 2021-01-14 ENCOUNTER — APPOINTMENT (OUTPATIENT)
Dept: MAMMOGRAPHY | Facility: CLINIC | Age: 66
End: 2021-01-14
Payer: MEDICAID

## 2021-01-14 ENCOUNTER — OUTPATIENT (OUTPATIENT)
Dept: OUTPATIENT SERVICES | Facility: HOSPITAL | Age: 66
LOS: 1 days | End: 2021-01-14
Payer: MEDICAID

## 2021-01-14 DIAGNOSIS — Z00.8 ENCOUNTER FOR OTHER GENERAL EXAMINATION: ICD-10-CM

## 2021-01-14 DIAGNOSIS — Z12.31 ENCOUNTER FOR SCREENING MAMMOGRAM FOR MALIGNANT NEOPLASM OF BREAST: ICD-10-CM

## 2021-01-14 PROCEDURE — 77067 SCR MAMMO BI INCL CAD: CPT | Mod: 26

## 2021-01-14 PROCEDURE — 77067 SCR MAMMO BI INCL CAD: CPT

## 2021-01-14 PROCEDURE — 77063 BREAST TOMOSYNTHESIS BI: CPT | Mod: 26

## 2021-01-14 PROCEDURE — 77063 BREAST TOMOSYNTHESIS BI: CPT

## 2021-01-25 ENCOUNTER — NON-APPOINTMENT (OUTPATIENT)
Age: 66
End: 2021-01-25

## 2021-04-06 ENCOUNTER — APPOINTMENT (OUTPATIENT)
Dept: FAMILY MEDICINE | Facility: CLINIC | Age: 66
End: 2021-04-06
Payer: MEDICAID

## 2021-04-06 ENCOUNTER — NON-APPOINTMENT (OUTPATIENT)
Age: 66
End: 2021-04-06

## 2021-04-06 VITALS
DIASTOLIC BLOOD PRESSURE: 70 MMHG | HEART RATE: 100 BPM | WEIGHT: 136 LBS | SYSTOLIC BLOOD PRESSURE: 110 MMHG | BODY MASS INDEX: 20.61 KG/M2 | HEIGHT: 68 IN

## 2021-04-06 PROCEDURE — 99072 ADDL SUPL MATRL&STAF TM PHE: CPT

## 2021-04-06 PROCEDURE — 99212 OFFICE O/P EST SF 10 MIN: CPT

## 2021-04-11 NOTE — HISTORY OF PRESENT ILLNESS
[FreeTextEntry1] : F/U STOOL RESULT [de-identified] : MS. PRUITT IS A 64 YO PRESENTING FOR FOLLOW-UP TO DISCUSS A POSITIVE FIT TEST RESULT SHE JUST RECEIVED.  STATES THAT SHE WAS NOTIFIED BY ANOTHER PROVIDER THAT IT WAS POSITIVE.  IT IS VERY DIFFICULT TO OBTAIN A HISTORY FROM HER TODAY.  DOES NOT RECALL THE EXACT DATE OF HER LAST COLONOSCOPY BUT WAS NOT TOO LONG AGO.  NO ABDOMINAL PAIN OR N/V/D.  INITIALLY SAID NO BLOOD IN STOOL.  AGAIN, DIFFICULT TO OBTAIN HISTORY AS AGITATED IN OFFICE TODAY.  STATES THAT SHE MAY BE MOVING TO [place] WITHIN THE NEXT 3 WEEKS.  IS AGREEABLE TO HAVE A COLONOSCOPY IF NEEDED.

## 2021-04-11 NOTE — PLAN
[FreeTextEntry1] : REVIEWED RESULTS OF FIT TEST THAT MS. PRUITT BROUGHT IN WITH HER TODAY.  IT WAS ORDERED BY A DIFFERENT PROVIDER AND APPEARS TO HAVE BEEN RESULTED ON 3/24/21 FROM A SAMPLE COLLECTED ON 6/30/20 PRIOR TO HER BECOMING A PATIENT OF THE PRACTICE.  \par SHE IS INQUIRING WHY WE DID NOT CALL HER WITH THE RESULT.  I HAVE EXPLAINED THAT I DID NOT ORDER THE TEST AND THAT A COPY WAS SENT TO THE PRIMARY CARE PROVIDER LISTED.\par I HAVE DISCUSSED GI EVALUATION AND COLONOSCOPY\par SHE IS AGITATED AND YELLING AT ME THAT SHE DOESN'T WANT TO SEE GI AND JUST WANTS TO GO FOR A COLONOSCOPY.  I HAVE ADVISED HER THAT WE WOULD BE HAPPY TO HELP EXPEDITE AN APPOINTMENT FOR HER BUT THE LOCAL GI PROVIDERS FROM ALL THAT I AM AWARE OF WILL REQUIRE TO SEE HER FIRST.  AS SHE IS MOVING SOON I ALSO ADVISED HER THAT SHOULD SHE MOVE, SHE COULD HAVE THE EVALUATION IN [place].  I LASTLY SUGGESTED THAT SHE COULD POSSIBLY HAVE A TELEHEALTH APPOINTMENT WITH ONE OF THE GI PROVIDERS INSTEAD OF IN PERSON IF SHE PREFERRED.\par AGAIN, IT WAS VERY DIFFICULT TO SPEAK WITH HER TODAY AND AS SHE DID NOT CONSENT TO EXAMINATION WHEN I ASKED HER ON MORE THAN ONE OCCASION, I WAS NOT ABLE TO PERFORM AN EXAMINATION OR CONTINUE ON WITH THE VISIT MORE THAN ABOVE.\par I DID ADVISE THAT I WAS TRYING TO HELP HER.\par IS AWARE PURPOSE IS TO SCREEN FOR CANCER

## 2021-04-11 NOTE — REVIEW OF SYSTEMS
[Abdominal Pain] : no abdominal pain [Diarrhea] : diarrhea [Vomiting] : no vomiting [FreeTextEntry2] : DIFFICULT TO OBTAIN

## 2021-04-13 ENCOUNTER — APPOINTMENT (OUTPATIENT)
Dept: GASTROENTEROLOGY | Facility: CLINIC | Age: 66
End: 2021-04-13
Payer: MEDICAID

## 2021-04-13 VITALS
RESPIRATION RATE: 14 BRPM | BODY MASS INDEX: 20 KG/M2 | SYSTOLIC BLOOD PRESSURE: 120 MMHG | HEIGHT: 68 IN | WEIGHT: 132 LBS | TEMPERATURE: 98 F | DIASTOLIC BLOOD PRESSURE: 80 MMHG | OXYGEN SATURATION: 97 %

## 2021-04-13 DIAGNOSIS — R19.5 OTHER FECAL ABNORMALITIES: ICD-10-CM

## 2021-04-13 PROCEDURE — 99204 OFFICE O/P NEW MOD 45 MIN: CPT

## 2021-04-13 PROCEDURE — 99072 ADDL SUPL MATRL&STAF TM PHE: CPT

## 2021-04-13 NOTE — PHYSICAL EXAM
[General Appearance - Alert] : alert [General Appearance - In No Acute Distress] : in no acute distress [General Appearance - Well Nourished] : well nourished [General Appearance - Well Developed] : well developed [General Appearance - Well-Appearing] : healthy appearing [Sclera] : the sclera and conjunctiva were normal [PERRL With Normal Accommodation] : pupils were equal in size, round, and reactive to light [Extraocular Movements] : extraocular movements were intact [Outer Ear] : the ears and nose were normal in appearance [Oropharynx] : the oropharynx was normal [Neck Appearance] : the appearance of the neck was normal [Neck Cervical Mass (___cm)] : no neck mass was observed [Jugular Venous Distention Increased] : there was no jugular-venous distention [Thyroid Diffuse Enlargement] : the thyroid was not enlarged [Thyroid Nodule] : there were no palpable thyroid nodules [Auscultation Breath Sounds / Voice Sounds] : lungs were clear to auscultation bilaterally [Heart Rate And Rhythm] : heart rate was normal and rhythm regular [Heart Sounds] : normal S1 and S2 [Heart Sounds Gallop] : no gallops [Murmurs] : no murmurs [Heart Sounds Pericardial Friction Rub] : no pericardial rub [Bowel Sounds] : normal bowel sounds [Abdomen Soft] : soft [Abdomen Tenderness] : non-tender [] : no hepato-splenomegaly [Abdomen Mass (___ Cm)] : no abdominal mass palpated [No CVA Tenderness] : no ~M costovertebral angle tenderness [Abnormal Walk] : normal gait [Musculoskeletal - Swelling] : no joint swelling seen [Skin Color & Pigmentation] : normal skin color and pigmentation [Skin Turgor] : normal skin turgor [Oriented To Time, Place, And Person] : oriented to person, place, and time [FreeTextEntry1] : Deferred until colonoscopy

## 2021-04-13 NOTE — HISTORY OF PRESENT ILLNESS
[None] : had no significant interval events [Heartburn] : denies heartburn [Nausea] : denies nausea [Vomiting] : denies vomiting [Diarrhea] : denies diarrhea [Constipation] : denies constipation [Yellow Skin Or Eyes (Jaundice)] : denies jaundice [Abdominal Pain] : denies abdominal pain [Abdominal Swelling] : denies abdominal swelling [Rectal Pain] : denies rectal pain [Wt Gain ___ Lbs] : no recent weight gain [Wt Loss ___ Lbs] : no recent weight loss [GERD] : no gastroesophageal reflux disease [Hiatus Hernia] : no hiatus hernia [Peptic Ulcer Disease] : no peptic ulcer disease [Pancreatitis] : no pancreatitis [Cholelithiasis] : no cholelithiasis [Kidney Stone] : no kidney stone [Inflammatory Bowel Disease] : no inflammatory bowel disease [Irritable Bowel Syndrome] : no irritable bowel syndrome [Diverticulitis] : no diverticulitis [Alcohol Abuse] : no alcohol abuse [Malignancy] : no malignancy [Abdominal Surgery] : no abdominal surgery [Appendectomy] : no appendectomy [Cholecystectomy] : no cholecystectomy [de-identified] : This is the patient's first visit here [de-identified] : Patient presents for initial evaluation for positive fecal immunochemical test or FIT.  She states that she last had a colonoscopy done roughly 5 years ago that was unremarkable and has no GI symptoms or complaints such as abdominal pain or change in bowel habits or unexplained weight loss or anorexia or nausea or vomiting or hematemesis or melena or hematochezia.  She has no first-degree relatives with colon cancer or colon polyps.

## 2021-04-13 NOTE — ASSESSMENT
[FreeTextEntry1] : Impression: Positive fecal immunochemical test rule out colonic neoplasm.\par \par Recommendations: High risk screening colonoscopy was advised for further evaluation of the above.  The risk versus benefits of high risk screening colonoscopy and intravenous sedation, and alternative testing such as virtual colonoscopy, were individually explained to the patient today who appeared to understand all of the above and was agreeable to proceeding with repeat colonoscopy.  Her ASA classification is 2.  She will be prepped with MiraLAX and Dulcolax tablets and is medically optimized for the proposed colonoscopy and appeared to understand all of the above instructions, information, and management plan.

## 2021-05-05 ENCOUNTER — OUTPATIENT (OUTPATIENT)
Dept: OUTPATIENT SERVICES | Facility: HOSPITAL | Age: 66
LOS: 1 days | End: 2021-05-05
Payer: MEDICAID

## 2021-05-05 DIAGNOSIS — Z20.828 CONTACT WITH AND (SUSPECTED) EXPOSURE TO OTHER VIRAL COMMUNICABLE DISEASES: ICD-10-CM

## 2021-05-05 LAB — SARS-COV-2 RNA SPEC QL NAA+PROBE: SIGNIFICANT CHANGE UP

## 2021-05-05 PROCEDURE — U0005: CPT

## 2021-05-05 PROCEDURE — U0003: CPT

## 2021-05-07 ENCOUNTER — APPOINTMENT (OUTPATIENT)
Dept: GASTROENTEROLOGY | Facility: GI CENTER | Age: 66
End: 2021-05-07
Payer: MEDICAID

## 2021-05-07 ENCOUNTER — OUTPATIENT (OUTPATIENT)
Dept: OUTPATIENT SERVICES | Facility: HOSPITAL | Age: 66
LOS: 1 days | End: 2021-05-07
Payer: MEDICAID

## 2021-05-07 DIAGNOSIS — R19.5 OTHER FECAL ABNORMALITIES: ICD-10-CM

## 2021-05-07 DIAGNOSIS — K64.8 OTHER HEMORRHOIDS: ICD-10-CM

## 2021-05-07 PROCEDURE — 45378 DIAGNOSTIC COLONOSCOPY: CPT

## 2021-05-07 NOTE — PROCEDURE
[Hemoccult +] : hemoccult positive stool [Procedure Explained] : The procedure was explained [Allergies Reviewed] : allergies reviewed. [Risks] : Risks [Benefits] : benefits [Alternatives] : alternatives [Bleeding] : bleeding risk [Infection] : risk of infection [Consent Obtained] : written consent was obtained prior to the procedure and is detailed in the patient's record [Patient] : the patient [Bowel Prep Kit] : the patient took the appropriate bowel preparation kit as directed [Approved Diet Followed] : the patient avoided solid foods and adhered to the approved diet list for 24 hours prior to the procedure [Propofol ___ mg IV] : Propofol [unfilled] ~Umg intravenously [___ L/min Oxygen via NC] : [unfilled] ~Uliters/minute oxygen via nasal cannula [2] : 2 [Sedation Clearance] : the patient was cleared for moderate sedation [Time started: ___] : Start Time:  [unfilled] [Prep Qualtiy: ___] : Prep Quality:  [unfilled] [Withdrawal Time: ___] : Withdrawal Time:  [unfilled] [Time Completed: ___] : Completion Time:  [unfilled] [Performed By: ___] : Performed by:  DAWOOD [Left Lateral Decubitus] : The patient was positioned in the left lateral decubitus position [Cecum (Landmarks/Transillum)] : and guided to the cecum which was identified by the anatomic landmarks of the appendiceal orifice and ileocecal valve and by transillumination in the right lower quadrant [No Difficulty] : without difficulty [Insufflated] : insufflated [Single Pass Needed] : after a single pass [Retroflex View] : a retroflex view of the rectum was performed [Normal] : Normal [Hemorrhoids] : hemorrhoids [Tolerated Well] : the patient tolerated the procedure well [Vital Signs Stable] : the vital signs were stable [No Complications] : There were no complications [Abnormal Rectum] : a normal rectum [External Hemorrhoids] : no external hemorrhoids [Patient Rotated Into Alternating Positions] : the patient was not rotated [de-identified] : + FIT [de-identified] : [facility] [address] DL [id number] [de-identified] : Internal hemorrhoids o/w normal colonoscopy to the cecum.

## 2021-05-07 NOTE — PHYSICAL EXAM
[General Appearance - In No Acute Distress] : in no acute distress [General Appearance - Alert] : alert [General Appearance - Well Nourished] : well nourished [General Appearance - Well Developed] : well developed [General Appearance - Well-Appearing] : healthy appearing [Sclera] : the sclera and conjunctiva were normal [PERRL With Normal Accommodation] : pupils were equal in size, round, and reactive to light [Extraocular Movements] : extraocular movements were intact [Outer Ear] : the ears and nose were normal in appearance [Oropharynx] : the oropharynx was normal [Neck Appearance] : the appearance of the neck was normal [Neck Cervical Mass (___cm)] : no neck mass was observed [Jugular Venous Distention Increased] : there was no jugular-venous distention [Thyroid Diffuse Enlargement] : the thyroid was not enlarged [Thyroid Nodule] : there were no palpable thyroid nodules [Auscultation Breath Sounds / Voice Sounds] : lungs were clear to auscultation bilaterally [Heart Sounds] : normal S1 and S2 [Heart Rate And Rhythm] : heart rate was normal and rhythm regular [Heart Sounds Gallop] : no gallops [Murmurs] : no murmurs [Heart Sounds Pericardial Friction Rub] : no pericardial rub [Bowel Sounds] : normal bowel sounds [Abdomen Soft] : soft [Abdomen Tenderness] : non-tender [] : no hepato-splenomegaly [Abdomen Mass (___ Cm)] : no abdominal mass palpated [No CVA Tenderness] : no ~M costovertebral angle tenderness [Abnormal Walk] : normal gait [Musculoskeletal - Swelling] : no joint swelling seen [Skin Color & Pigmentation] : normal skin color and pigmentation [Skin Turgor] : normal skin turgor [Oriented To Time, Place, And Person] : oriented to person, place, and time [FreeTextEntry1] : Deferred until colonoscopy

## 2021-05-07 NOTE — PROCEDURE
[Hemoccult +] : hemoccult positive stool [Procedure Explained] : The procedure was explained [Allergies Reviewed] : allergies reviewed. [Risks] : Risks [Benefits] : benefits [Alternatives] : alternatives [Bleeding] : bleeding risk [Infection] : risk of infection [Consent Obtained] : written consent was obtained prior to the procedure and is detailed in the patient's record [Patient] : the patient [Bowel Prep Kit] : the patient took the appropriate bowel preparation kit as directed [Approved Diet Followed] : the patient avoided solid foods and adhered to the approved diet list for 24 hours prior to the procedure [Propofol ___ mg IV] : Propofol [unfilled] ~Umg intravenously [___ L/min Oxygen via NC] : [unfilled] ~Uliters/minute oxygen via nasal cannula [2] : 2 [Sedation Clearance] : the patient was cleared for moderate sedation [Time started: ___] : Start Time:  [unfilled] [Prep Qualtiy: ___] : Prep Quality:  [unfilled] [Withdrawal Time: ___] : Withdrawal Time:  [unfilled] [Time Completed: ___] : Completion Time:  [unfilled] [Performed By: ___] : Performed by:  DAWOOD [Left Lateral Decubitus] : The patient was positioned in the left lateral decubitus position [Cecum (Landmarks/Transillum)] : and guided to the cecum which was identified by the anatomic landmarks of the appendiceal orifice and ileocecal valve and by transillumination in the right lower quadrant [No Difficulty] : without difficulty [Insufflated] : insufflated [Single Pass Needed] : after a single pass [Retroflex View] : a retroflex view of the rectum was performed [Normal] : Normal [Hemorrhoids] : hemorrhoids [Tolerated Well] : the patient tolerated the procedure well [Vital Signs Stable] : the vital signs were stable [No Complications] : There were no complications [Abnormal Rectum] : a normal rectum [External Hemorrhoids] : no external hemorrhoids [Patient Rotated Into Alternating Positions] : the patient was not rotated [de-identified] : + FIT [de-identified] : [facility] [address] DL [id number] [de-identified] : Internal hemorrhoids o/w normal colonoscopy to the cecum.

## 2021-05-07 NOTE — REASON FOR VISIT
[Procedure: _________] : a [unfilled] procedure visit [FreeTextEntry1] : For + FIT [Colonoscopy] : a colonoscopy [FreeTextEntry2] : For + FIT

## 2021-05-07 NOTE — PHYSICAL EXAM
[General Appearance - Alert] : alert [General Appearance - In No Acute Distress] : in no acute distress [General Appearance - Well Nourished] : well nourished [General Appearance - Well Developed] : well developed [General Appearance - Well-Appearing] : healthy appearing [Sclera] : the sclera and conjunctiva were normal [PERRL With Normal Accommodation] : pupils were equal in size, round, and reactive to light [Extraocular Movements] : extraocular movements were intact [Outer Ear] : the ears and nose were normal in appearance [Oropharynx] : the oropharynx was normal [Neck Appearance] : the appearance of the neck was normal [Neck Cervical Mass (___cm)] : no neck mass was observed [Jugular Venous Distention Increased] : there was no jugular-venous distention [Thyroid Diffuse Enlargement] : the thyroid was not enlarged [Thyroid Nodule] : there were no palpable thyroid nodules [Auscultation Breath Sounds / Voice Sounds] : lungs were clear to auscultation bilaterally [Heart Sounds] : normal S1 and S2 [Heart Rate And Rhythm] : heart rate was normal and rhythm regular yes [Heart Sounds Gallop] : no gallops [Murmurs] : no murmurs [Heart Sounds Pericardial Friction Rub] : no pericardial rub [Bowel Sounds] : normal bowel sounds [Abdomen Soft] : soft [Abdomen Tenderness] : non-tender [] : no hepato-splenomegaly [Abdomen Mass (___ Cm)] : no abdominal mass palpated [No CVA Tenderness] : no ~M costovertebral angle tenderness [Abnormal Walk] : normal gait [Musculoskeletal - Swelling] : no joint swelling seen [Skin Color & Pigmentation] : normal skin color and pigmentation [Skin Turgor] : normal skin turgor [Oriented To Time, Place, And Person] : oriented to person, place, and time [FreeTextEntry1] : Deferred until colonoscopy

## 2021-05-25 NOTE — PROGRESS NOTE ADULT - SUBJECTIVE AND OBJECTIVE BOX
C/O Lower back pain. CHIEF COMPLAINT:Patient is a 63y old  Female who presents with a chief complaint of pericarditis, dizziness (10 Oct 2018 10:30)  no acute events    PAST MEDICAL & SURGICAL HISTORY:  Anxiety  No significant past surgical history          REVIEW OF SYSTEMS:  CONSTITUTIONAL: No fever, weight loss, or fatigue  EYES: No eye pain, visual disturbances, or discharge  NECK: No pain or stiffness  RESPIRATORY: No cough, wheezing, chills or hemoptysis; No Shortness of Breath  CARDIOVASCULAR: No chest pain, palpitations, passing out,  GASTROINTESTINAL: No abdominal or epigastric pain. less nausea, no vomiting, or hematemesis; No diarrhea or constipation. No melena or hematochezia.  GENITOURINARY: No dysuria, frequency, hematuria, or incontinence  NEUROLOGICAL: No headaches, memory loss, loss of strength, still some dizziness  MUSCULOSKELETAL: No joint pain or swelling; No muscle, back, or extremity pain      Medications:  MEDICATIONS  (STANDING):  colchicine 0.6 milliGRAM(s) Oral two times a day  escitalopram 30 milliGRAM(s) Oral daily  heparin  Injectable 5000 Unit(s) SubCutaneous every 12 hours  influenza   Vaccine 0.5 milliLiter(s) IntraMuscular once  mirtazapine 15 milliGRAM(s) Oral at bedtime    MEDICATIONS  (PRN):  meclizine 12.5 milliGRAM(s) Oral every 8 hours PRN Dizziness  ondansetron Injectable 4 milliGRAM(s) IV Push every 6 hours PRN Nausea    	    PHYSICAL EXAM:  T(C): 36.8 (10-16-18 @ 12:34), Max: 36.9 (10-15-18 @ 20:10)  HR: 82 (10-16-18 @ 12:34) (65 - 85)  BP: 112/68 (10-16-18 @ 12:34) (101/65 - 117/79)  RR: 18 (10-16-18 @ 12:34) (18 - 18)  SpO2: 95% (10-16-18 @ 12:34) (95% - 98%)  Wt(kg): --  I&O's Summary    15 Oct 2018 07:01  -  16 Oct 2018 07:00  --------------------------------------------------------  IN: 700 mL / OUT: 700 mL / NET: 0 mL    16 Oct 2018 07:01  -  16 Oct 2018 15:32  --------------------------------------------------------  IN: 460 mL / OUT: 300 mL / NET: 160 mL      Appearance: Normal	  HEENT:   Normal oral mucosa, PERRL, EOMI	  Lymphatic: No lymphadenopathy  Cardiovascular: Normal S1 S2, No JVD, No murmurs, No edema  Respiratory: Lungs clear to auscultation	  Psychiatry: A & O x 3,   Gastrointestinal:  Soft, Non-tender, + BS	  Skin: No rashes, No ecchymoses, No cyanosis	  Neurologic: Non-focal from motor equal  + vertiginous symptoms  Extremities: Normal range of motion, No clubbing, cyanosis or edema  Vascular: Peripheral pulses palpable 2+ bilaterally    LABS:	 	    CARDIAC MARKERS:                                14.8   6.98  )-----------( 349      ( 15 Oct 2018 07:51 )             46.4     10-15    140  |  101  |  22  ----------------------------<  97  4.7   |  27  |  0.94    Ca    9.9      15 Oct 2018 06:47      proBNP:   Lipid Profile:   HgA1c:   TSH:

## 2022-02-14 NOTE — ED ADULT NURSE NOTE - NSFALLRSKUNASSIST_ED_ALL_ED
CHIEF COMPLAINT: Alayna Siddiqui is a 53 year old female   who presents here today for   Chief Complaint   Patient presents with   • Gyn Exam     Well woman exam. Last evelyne 12/31/2020 WNL. No record of mammo.   • Menstrual Problem     C/O heavy menses. . Hx. of  fibroids,    fibroids        ROS  Review of Systems   Constitutional: Negative.    HENT: Negative.    Eyes: Negative.    Respiratory: Negative.    Cardiovascular: Negative.    Gastrointestinal: Negative.    Genitourinary: Negative.    Musculoskeletal: Negative.    Skin: Negative.    Neurological: Negative.    Endo/Heme/Allergies: Negative.    Psychiatric/Behavioral: Negative.           HPI: Patient is  known to my office, presents today for:  Physical Exam  Constitutional:       Appearance: Normal appearance.   HENT:      Head: Normocephalic.   Eyes:      General: No scleral icterus.        Right eye: No discharge.         Left eye: No discharge.   Cardiovascular:      Rate and Rhythm: Normal rate and regular rhythm.      Pulses: Normal pulses.      Heart sounds: Normal heart sounds.   Pulmonary:      Effort: Pulmonary effort is normal.      Breath sounds: Normal breath sounds.   Abdominal:      General: Abdomen is flat.      Palpations: Abdomen is soft.   Musculoskeletal:      Cervical back: Normal range of motion and neck supple.   Neurological:      General: No focal deficit present.      Mental Status: She is alert and oriented to person, place, and time.   Skin:     General: Skin is warm.   Psychiatric:         Mood and Affect: Mood normal.         Behavior: Behavior normal.         Thought Content: Thought content normal.         Judgment: Judgment normal.          PHYSICAL EXAM  Visit Vitals  /83 (BP Location: LUE - Left upper extremity, Patient Position: Sitting, Cuff Size: Regular)   Temp (!) 67 °F (19.4 °C) (Temporal)   Resp 17   Ht 5' 3\" (1.6 m)   Wt 75.3 kg (166 lb 0.1 oz)   LMP 02/07/2022   SpO2 97%   BMI 29.41 kg/m²     Vital signs were  reviewed during today's visit.   GENERAL APEARANCE:   normal appearing female with normal affect and in no distress  BREASTS:   well healed surgical scars bilaterally, No masses, non-tender, no nipple discharge or lymphadenopathy. No skin changes.  Axilla : No masses or adenopathy  ABDOMEN: well healed surgical scar Soft, non-tender, non-distended.  No hepatosplenomegaly.  Normal bowel sounds.  No inguinal hernias.None tender umbilical hernia   SKIN:  Warm and dry to touch.  No lesions or rashes noted.    PSYCHIATRIC/NEUROLOGIC:  Appropriate mood and affect, normal recall, alert and oriented x 3  EXTREMITIES:  Warm and well perfused.  No edema noted.    :  VULVA: Inspection of her external genitalia reveals normal mons pubis, labia minora and labia majora.  Normal appearing     Clitoris, urethral meatus and Parsons's glands. No lesions.  URETHRA : Normal   BLADDER:  No evidence of urethral or bladder tenderness.    VAGINA:  Normal, no lesions or masses  CERVIX:  Normal in appearance with no lesions.  There is no cervical motion tenderness.   UTERUS:  Uterus is mobile and non-tender,some what enlarged irregular size size.   ADNEXA : No masses or tenderness  ANUS : Normal external appearance with small hemorroids      OB/GYN HISTORY  OB History    Para Term  AB Living   1 1 0 0 0 1   SAB IAB Ectopic Molar Multiple Live Births   0 0 0 0 0 1       Menstrual History  Patient's last menstrual period was 2022.  Menstrual Tracking  Period Duration (Days): 6  Period Pattern: (!) Irregular  Menstrual Flow: Heavy  Menstrual Control: Maxi pad  Menstrual Control Change Frequency (Hours): 2  Dysmenorrhea: None     HISTORY  Past Medical History:   Diagnosis Date   • Arthritis    • Asthma    • Chlamydia    • Essential (primary) hypertension    • Fibroids 2016   • Leiomyoma of uterus 2016      Past Surgical History:   Procedure Laterality Date   • Breast lumpectomy      bilateral    • Breast surgery       lumpectomy   •  section, low transverse       Current Outpatient Medications   Medication Sig Dispense Refill   • amLODIPine (NORVASC) 5 MG tablet TAKE 1 TABLET BY MOUTH DAILY 90 tablet 2     No current facility-administered medications for this visit.     ALLERGIES:  No Known Allergies  Family History   Problem Relation Age of Onset   • Diabetes Mother    • Hypertension Mother    • Kidney disease Mother    • Stroke Mother    • Hypertension Father    • Asthma Son    • Seizure Disorder Sister      Social History     Tobacco Use   • Smoking status: Never Smoker   • Smokeless tobacco: Never Used   Vaping Use   • Vaping Use: never used   Substance Use Topics   • Alcohol use: Yes     Comment: rarely   • Drug use: Never      Histories were reviewed and updated during today's visit.      ASSESSMENT/PLAN:   Know patient  female annual  Pap smear  Mammogram: BSE  Hemoccult  Pelvic ultrasound  Dexa scan  Return in about 3 months (around 2022), or fibroids.        Maite Landa MD  2022   no

## 2022-05-18 NOTE — ED STATDOCS - CROS ED PSYCH ALL NEG
- - - Solaraze Pregnancy And Lactation Text: This medication is Pregnancy Category B and is considered safe. There is some data to suggest avoiding during the third trimester. It is unknown if this medication is excreted in breast milk.

## 2022-05-26 NOTE — ED ADULT NURSE NOTE - NSALCOHOLSOBERPERIOD_GEN_A_CORE_FT
Reviewed strip #10, pages 20-22, AF with RVR for 60 secs with V rates of 198 bpm.  Multiple AF episodes with RVR noted on report.    Pt symptoms include racing HR/fluttering/heart pounding and cough.  Pt states she couldn' sleep well the night of 5/13/22.  She woke up on 5/14/22 with a low heart rate and states she feels worse with a low heart than when her heart is racing.  She lowered her Coreg dose to 3.125 mg BID and feels much better with that dose.  She states she hasn't felt any palpitation in the past couple of days.  She started wearing her CPAP last week.        S/P RFA AFL on 3/31/22    Pt not  taking Coreg 6.25 mg BID as prescribed, since 5/14/22 she has been taking 3.125 mg BID    See media tab for #10 AF episode  with RVR.    Final ZIO report will be reviewed by Dr. Kirk.    Instructed patient to call the clinic with questions or concerns.       15 years

## 2022-09-26 ENCOUNTER — APPOINTMENT (RX ONLY)
Dept: URBAN - METROPOLITAN AREA CLINIC 120 | Facility: CLINIC | Age: 67
Setting detail: DERMATOLOGY
End: 2022-09-26

## 2022-09-26 DIAGNOSIS — L81.4 OTHER MELANIN HYPERPIGMENTATION: ICD-10-CM

## 2022-09-26 DIAGNOSIS — L20.89 OTHER ATOPIC DERMATITIS: ICD-10-CM

## 2022-09-26 DIAGNOSIS — D18.0 HEMANGIOMA: ICD-10-CM

## 2022-09-26 DIAGNOSIS — R20.2 PARESTHESIA OF SKIN: ICD-10-CM

## 2022-09-26 DIAGNOSIS — D17 BENIGN LIPOMATOUS NEOPLASM: ICD-10-CM

## 2022-09-26 DIAGNOSIS — L57.8 OTHER SKIN CHANGES DUE TO CHRONIC EXPOSURE TO NONIONIZING RADIATION: ICD-10-CM | Status: INADEQUATELY CONTROLLED

## 2022-09-26 DIAGNOSIS — D22 MELANOCYTIC NEVI: ICD-10-CM

## 2022-09-26 DIAGNOSIS — L82.1 OTHER SEBORRHEIC KERATOSIS: ICD-10-CM

## 2022-09-26 PROBLEM — D22.62 MELANOCYTIC NEVI OF LEFT UPPER LIMB, INCLUDING SHOULDER: Status: ACTIVE | Noted: 2022-09-26

## 2022-09-26 PROBLEM — D18.01 HEMANGIOMA OF SKIN AND SUBCUTANEOUS TISSUE: Status: ACTIVE | Noted: 2022-09-26

## 2022-09-26 PROBLEM — D17.1 BENIGN LIPOMATOUS NEOPLASM OF SKIN AND SUBCUTANEOUS TISSUE OF TRUNK: Status: ACTIVE | Noted: 2022-09-26

## 2022-09-26 PROBLEM — L20.84 INTRINSIC (ALLERGIC) ECZEMA: Status: ACTIVE | Noted: 2022-09-26

## 2022-09-26 PROBLEM — D22.5 MELANOCYTIC NEVI OF TRUNK: Status: ACTIVE | Noted: 2022-09-26

## 2022-09-26 PROCEDURE — 99204 OFFICE O/P NEW MOD 45 MIN: CPT

## 2022-09-26 PROCEDURE — ? PATIENT SPECIFIC COUNSELING

## 2022-09-26 PROCEDURE — ? COUNSELING

## 2022-09-26 PROCEDURE — ? ADDITIONAL NOTES

## 2022-09-26 PROCEDURE — ? PRESCRIPTION

## 2022-09-26 PROCEDURE — ? SUNSCREEN RECOMMENDATIONS

## 2022-09-26 RX ORDER — TRIAMCINOLONE ACETONIDE 1 MG/G
CREAM TOPICAL
Qty: 80 | Refills: 1 | Status: ERX | COMMUNITY
Start: 2022-09-26

## 2022-09-26 RX ADMIN — TRIAMCINOLONE ACETONIDE: 1 CREAM TOPICAL at 00:00

## 2022-09-26 ASSESSMENT — LOCATION DETAILED DESCRIPTION DERM
LOCATION DETAILED: LEFT MEDIAL UPPER BACK
LOCATION DETAILED: LEFT SUPERIOR MEDIAL UPPER BACK
LOCATION DETAILED: RIGHT INFERIOR CENTRAL MALAR CHEEK
LOCATION DETAILED: RIGHT CENTRAL ZYGOMA
LOCATION DETAILED: RIGHT SUPERIOR MEDIAL MIDBACK
LOCATION DETAILED: SUPERIOR LUMBAR SPINE
LOCATION DETAILED: LEFT PROXIMAL POSTERIOR UPPER ARM
LOCATION DETAILED: LEFT ANTERIOR PROXIMAL UPPER ARM
LOCATION DETAILED: RIGHT SUPERIOR UPPER BACK
LOCATION DETAILED: RIGHT PROXIMAL POSTERIOR UPPER ARM
LOCATION DETAILED: LEFT INFERIOR MEDIAL MALAR CHEEK

## 2022-09-26 ASSESSMENT — LOCATION SIMPLE DESCRIPTION DERM
LOCATION SIMPLE: RIGHT LOWER BACK
LOCATION SIMPLE: LOWER BACK
LOCATION SIMPLE: LEFT POSTERIOR UPPER ARM
LOCATION SIMPLE: RIGHT UPPER BACK
LOCATION SIMPLE: LEFT UPPER ARM
LOCATION SIMPLE: RIGHT POSTERIOR UPPER ARM
LOCATION SIMPLE: RIGHT CHEEK
LOCATION SIMPLE: RIGHT ZYGOMA
LOCATION SIMPLE: LEFT CHEEK
LOCATION SIMPLE: LEFT UPPER BACK

## 2022-09-26 ASSESSMENT — LOCATION ZONE DERM
LOCATION ZONE: FACE
LOCATION ZONE: ARM
LOCATION ZONE: TRUNK

## 2022-09-26 NOTE — PROCEDURE: ADDITIONAL NOTES
Render Risk Assessment In Note?: no
Additional Notes: Patient's consent was obtained to proceed with the visit and recommended plan of care after discussion of all risks and benefits, including the risks of COVID-19 exposure.
Detail Level: Simple
Additional Notes: S.E discussed

## 2022-09-26 NOTE — HPI: SKIN LESIONS
How Severe Is Your Skin Lesion?: moderate
Have Your Skin Lesions Been Treated?: not been treated
Is This A New Presentation, Or A Follow-Up?: Skin Lesions
Which Family Member (Optional)?: Mom
Additional History: Pt here for FSE, after going to tanning salon 3 weeks pt states her back itchs more than before.

## 2022-10-04 ENCOUNTER — APPOINTMENT (RX ONLY)
Dept: URBAN - METROPOLITAN AREA CLINIC 120 | Facility: CLINIC | Age: 67
Setting detail: DERMATOLOGY
End: 2022-10-04

## 2022-10-04 DIAGNOSIS — Z41.9 ENCOUNTER FOR PROCEDURE FOR PURPOSES OTHER THAN REMEDYING HEALTH STATE, UNSPECIFIED: ICD-10-CM

## 2022-10-04 PROCEDURE — ? PHOTO-DOCUMENTATION

## 2022-10-04 PROCEDURE — ? COSMETIC CONSULTATION: FILLERS

## 2022-10-04 PROCEDURE — ? ADDITIONAL NOTES

## 2022-10-04 PROCEDURE — ? COSMETIC CONSULTATION: BOTULINUM TOXIN

## 2022-10-04 ASSESSMENT — LOCATION DETAILED DESCRIPTION DERM
LOCATION DETAILED: RIGHT INFERIOR LATERAL FOREHEAD
LOCATION DETAILED: LEFT MID TEMPLE
LOCATION DETAILED: INFERIOR MID FOREHEAD
LOCATION DETAILED: RIGHT INFERIOR MEDIAL MALAR CHEEK
LOCATION DETAILED: LEFT INFERIOR MEDIAL MALAR CHEEK

## 2022-10-04 ASSESSMENT — LOCATION ZONE DERM: LOCATION ZONE: FACE

## 2022-10-04 ASSESSMENT — LOCATION SIMPLE DESCRIPTION DERM
LOCATION SIMPLE: LEFT CHEEK
LOCATION SIMPLE: LEFT TEMPLE
LOCATION SIMPLE: INFERIOR FOREHEAD
LOCATION SIMPLE: RIGHT FOREHEAD
LOCATION SIMPLE: RIGHT CHEEK

## 2022-10-04 NOTE — PROCEDURE: ADDITIONAL NOTES
Render Risk Assessment In Note?: no
Additional Notes: Patient's consent was obtained to proceed with the visit and recommended plan of care after discussion of all risks and benefits, including the risks of COVID-19 exposure.
Detail Level: Simple
Additional Notes: Patient concern about wrinkles on the glabella, crows feet and  smile lines. Patient states she has had Botox done in the past. Quoted 38 units of Botox in total: 12 units to the glabella,16 around eyes, and 10 on forehead--$570.00.  Recommend Restylane Refyne for $700 for the smiles lines--nasolabial fold and marionette lines. Patient paid $50 dollars for consult which would be applied towards future treatment.  Patient stated understanding to all.

## 2022-10-08 NOTE — BEHAVIORAL HEALTH ASSESSMENT NOTE - NS ED BHA MED ROS CARDIOVASCULAR
Refill passed per [facility] protocol.      Requested Prescriptions   Pending Prescriptions Disp Refills    ATORVASTATIN 10 MG Oral Tab [Pharmacy Med Name: ATORVASTATIN TAB 10MG] 90 tablet 1     Sig: TAKE 1 TABLET DAILY        Cholesterol Medication Protocol Passed - 10/8/2022  7:09 AM        Passed - ALT in past 12 months        Passed - LDL in past 12 months        Passed - Last ALT < 80       Lab Results   Component Value Date    ALT 23 04/20/2022             Passed - Last LDL < 130     Lab Results   Component Value Date    LDL 82 04/20/2022               Passed - In person appointment or virtual visit in the past 12 mos or appointment in next 3 mos       Recent Outpatient Visits              5 months ago Controlled type 2 diabetes mellitus with stage 3 chronic kidney disease, without long-term current use of insulin ([address]    [place] [facility], [address] [facility], Leslye Hannon MD    Office Visit    8 months ago Controlled type 2 diabetes mellitus with stage 3 chronic kidney disease, without long-term current use of insulin [facility])    [place] [facility], [address], Leslye Hannon MD    Office Visit    9 months ago Neck pain    [address] [facility], [facility] [place] Gipson MD    Telemedicine    9 months ago     [address] [facility]    Office Visit    9 months ago Neck pain    [address] [facility], Mark Reid Se, MD    Office Visit     Future Appointments         Provider Department Appt Notes    In 2 weeks [facility] Annual mammogram    In 3 weeks Yadira Aguilar MD [facility] [address] annual physical                      Recent Outpatient Visits              5 months ago Controlled type 2 diabetes mellitus with stage 3 chronic kidney disease, without long-term current use of insulin ([address] [facility], [place] Jules MD    Office Visit    8 months ago Controlled type 2 diabetes mellitus with stage 3 chronic kidney disease, without long-term current use of insulin [facility])    [place] [facility] [employer] [facility], [facility], [address] [facility], Dejon Jules MD    Office Visit    9 months ago Neck pain    [address] [facility], Chirag Ricketts MD    Telemedicine    9 months ago     [address] [facility], Mariela Ruiz OT    Office Visit    9 months ago Neck pain    [address] [facility], [place] Chilango Churchill MD    Office Visit             Future Appointments         Provider Department Appt Notes    In 2 weeks ADO KEVIN [facility] Annual mammogram    In 3 weeks Glenna Narvaez MD [place] [facility] [employer] [facility], [facility], [address] [place] [address] annual physical No complaints

## 2023-01-15 NOTE — PATIENT PROFILE ADULT - NSPROGENANESREACTIONPT_GEN_A_NUR
Pt discharged in stable condition with family      Mary White RN  01/15/23 101
respiratory complications

## 2023-03-02 NOTE — ED STATDOCS - TOBACCO USE
Remote Patient Monitoring Note      Date/Time:  3/2/2023 2:02 PM  [facility] yellow alert received for no metrics for over 1 week. Verified patients name and  as identifiers. Background: HTN, COPD, DM  Clinical Interventions:  Called patient and left HIPPA compliant message for call back       Plan/Follow Up: Will continue to review, monitor and address alerts with follow up based on severity of symptoms and risk factors.   Current Patient Metrics ---- Blood Pressure: -/-, -bpm Glucose: -mg/dl Pulseox: -%, -bpm Survey: - Note Created at: 2023 02:03 PM ET ---- Time-Spent: 3 minutes 0 seconds
Unknown if ever smoked

## 2023-06-02 ENCOUNTER — APPOINTMENT (RX ONLY)
Dept: URBAN - METROPOLITAN AREA CLINIC 120 | Facility: CLINIC | Age: 68
Setting detail: DERMATOLOGY
End: 2023-06-02

## 2023-06-02 DIAGNOSIS — L82.0 INFLAMED SEBORRHEIC KERATOSIS: ICD-10-CM

## 2023-06-02 DIAGNOSIS — L81.4 OTHER MELANIN HYPERPIGMENTATION: ICD-10-CM

## 2023-06-02 DIAGNOSIS — D17 BENIGN LIPOMATOUS NEOPLASM: ICD-10-CM

## 2023-06-02 PROBLEM — D48.5 NEOPLASM OF UNCERTAIN BEHAVIOR OF SKIN: Status: ACTIVE | Noted: 2023-06-02

## 2023-06-02 PROBLEM — D17.1 BENIGN LIPOMATOUS NEOPLASM OF SKIN AND SUBCUTANEOUS TISSUE OF TRUNK: Status: ACTIVE | Noted: 2023-06-02

## 2023-06-02 PROCEDURE — ? BIOPSY BY SHAVE METHOD

## 2023-06-02 PROCEDURE — ? CONSULTATION EXCISION

## 2023-06-02 PROCEDURE — ? SUNSCREEN RECOMMENDATIONS

## 2023-06-02 PROCEDURE — 11102 TANGNTL BX SKIN SINGLE LES: CPT

## 2023-06-02 PROCEDURE — 99213 OFFICE O/P EST LOW 20 MIN: CPT | Mod: 25

## 2023-06-02 PROCEDURE — ? ADDITIONAL NOTES

## 2023-06-02 PROCEDURE — ? COUNSELING

## 2023-06-02 PROCEDURE — ? DEFER

## 2023-06-02 ASSESSMENT — LOCATION DETAILED DESCRIPTION DERM
LOCATION DETAILED: RIGHT MID-UPPER BACK
LOCATION DETAILED: LEFT CENTRAL MALAR CHEEK
LOCATION DETAILED: RIGHT INFERIOR LATERAL MALAR CHEEK
LOCATION DETAILED: RIGHT INFERIOR CENTRAL MALAR CHEEK
LOCATION DETAILED: RIGHT LATERAL MALAR CHEEK

## 2023-06-02 ASSESSMENT — LOCATION SIMPLE DESCRIPTION DERM
LOCATION SIMPLE: RIGHT CHEEK
LOCATION SIMPLE: RIGHT UPPER BACK
LOCATION SIMPLE: LEFT CHEEK

## 2023-06-02 ASSESSMENT — LOCATION ZONE DERM
LOCATION ZONE: TRUNK
LOCATION ZONE: FACE

## 2023-06-02 NOTE — HPI: SKIN LESION
Is This A New Presentation, Or A Follow-Up?: Skin Lesion
Additional History: Patient wants to remove spot on her face.

## 2023-06-02 NOTE — PROCEDURE: MIPS QUALITY
Detail Level: Detailed
Quality 226: Preventive Care And Screening: Tobacco Use: Screening And Cessation Intervention: Tobacco Screening not Performed
Quality 130: Documentation Of Current Medications In The Medical Record: Current Medications Documented
Quality 47: Advance Care Plan: Advance Care Planning discussed and documented in the medical record; patient did not wish or was not able to name a surrogate decision maker or provide an advance care plan.
Quality 431: Preventive Care And Screening: Unhealthy Alcohol Use - Screening: Patient not identified as an unhealthy alcohol user when screened for unhealthy alcohol use using a systematic screening method

## 2023-06-02 NOTE — HPI: CYST
Is This A New Presentation, Or A Follow-Up?: Cyst
Additional History: Patient states the cyst has enlarged and wants it removed.

## 2023-06-02 NOTE — PROCEDURE: DEFER
Size Of Lesion In Cm (Optional): 0
Detail Level: Detailed
Introduction Text (Please End With A Colon): The following procedure was deferred: [place] TAC

## 2023-06-27 ENCOUNTER — APPOINTMENT (RX ONLY)
Dept: URBAN - METROPOLITAN AREA CLINIC 120 | Facility: CLINIC | Age: 68
Setting detail: DERMATOLOGY
End: 2023-06-27

## 2023-06-27 DIAGNOSIS — D17 BENIGN LIPOMATOUS NEOPLASM: ICD-10-CM

## 2023-06-27 PROBLEM — D17.1 BENIGN LIPOMATOUS NEOPLASM OF SKIN AND SUBCUTANEOUS TISSUE OF TRUNK: Status: ACTIVE | Noted: 2023-06-27

## 2023-06-27 PROCEDURE — 13101 CMPLX RPR TRUNK 2.6-7.5 CM: CPT

## 2023-06-27 PROCEDURE — ? EXCISION

## 2023-06-27 PROCEDURE — ? ADDITIONAL NOTES

## 2023-06-27 PROCEDURE — 11406 EXC TR-EXT B9+MARG >4.0 CM: CPT

## 2023-06-27 ASSESSMENT — LOCATION SIMPLE DESCRIPTION DERM: LOCATION SIMPLE: RIGHT UPPER BACK

## 2023-06-27 ASSESSMENT — LOCATION ZONE DERM: LOCATION ZONE: TRUNK

## 2023-06-27 ASSESSMENT — LOCATION DETAILED DESCRIPTION DERM: LOCATION DETAILED: RIGHT MID-UPPER BACK

## 2023-06-27 NOTE — PROCEDURE: EXCISION

## 2023-07-07 ENCOUNTER — APPOINTMENT (RX ONLY)
Dept: URBAN - METROPOLITAN AREA CLINIC 120 | Facility: CLINIC | Age: 68
Setting detail: DERMATOLOGY
End: 2023-07-07

## 2023-07-07 DIAGNOSIS — Z48.02 ENCOUNTER FOR REMOVAL OF SUTURES: ICD-10-CM

## 2023-07-07 PROCEDURE — ? ADDITIONAL NOTES

## 2023-07-07 PROCEDURE — 99024 POSTOP FOLLOW-UP VISIT: CPT

## 2023-07-07 PROCEDURE — ? SUTURE REMOVAL (GLOBAL PERIOD)

## 2023-07-07 ASSESSMENT — LOCATION DETAILED DESCRIPTION DERM: LOCATION DETAILED: RIGHT SUPERIOR UPPER BACK

## 2023-07-07 ASSESSMENT — LOCATION SIMPLE DESCRIPTION DERM: LOCATION SIMPLE: RIGHT UPPER BACK

## 2023-07-07 ASSESSMENT — LOCATION ZONE DERM: LOCATION ZONE: TRUNK

## 2023-07-07 NOTE — PROCEDURE: SUTURE REMOVAL (GLOBAL PERIOD)
Detail Level: Detailed
Add 03763 Cpt? (Important Note: In 2017 The Use Of 24567 Is Being Tracked By Cms To Determine Future Global Period Reimbursement For Global Periods): yes

## 2023-12-13 NOTE — ED ADULT NURSE NOTE - NS [facility]
Pulmonary Progress Note    INTERVAL HISTORY     On O2 3L/NC  Extensive wheezing noted  Afebrile    ROS   As mentioned above    PHYSICAL EXAM     Visit Vitals  BP (!) 149/59 (BP Location: LUE - Left upper extremity, Patient Position: Semi-Anguiano's)   Pulse 79   Temp 98.2 °F (36.8 °C) (Oral)   Resp (!) 22   Ht 5' 3\" (1.6 m)   Wt 71.3 kg (157 lb 3 oz)   LMP  (LMP Unknown)   SpO2 96%   BMI 27.84 kg/m²       General: Resting in chair; NAD  Neuro: awake, alert, appropriate  HEENT: normocephalic, anicteric sclerae, neck supple  Resp: non-labored, (+) wheezing, O2 NC  CV: S1,S2  GI: Abdomen soft, non-tender, non-distended,  (+) BS  : bladder nondistended  Ext: no clubbing or edema; pulses present and equal bilaterally      DIAGNOSTIC DATA     CBC:   Recent Labs     12/12/23  0450 12/13/23  0615   WBC 10.7 13.3*   RBC 3.97* 3.83*   HGB 12.0 11.6*   HCT 36.9 35.8*    200         CMP:  Recent Labs     12/11/23  1551 12/12/23  0450 12/13/23  0615   SODIUM 135 136 139   POTASSIUM 4.3 4.4 4.4   CHLORIDE 104 106 110   ANIONGAP 8 11 6*   GLUCOSE 183* 223* 96   BUN 18 22* 19   CREATININE 0.98* 1.07* 0.93   ALBUMIN 4.1  --  3.1*   CALCIUM 9.8 9.1 9.1   AST 18  --  41*         Coagulation: No results for input(s): \"INR\", \"PTT\" in the last 72 hours.    Invalid input(s): \"PTP\"    Cardiac markers: No results for input(s): \"TROPONINI\" in the last 72 hours.    ABGs: No results for input(s): \"PHARTERIAL\" in the last 72 hours.    Invalid input(s): \"CO2ART\", \"[id number]\", \"PO2ART\", \"O2ART\"    Mg: No results for input(s): \"MAGNESIUM\" in the last 72 hours.    BNP: No results for input(s): \"BNP\" in the last 72 hours.    CTA CHEST PULMONARY EMBOLISM   Final Result by Shahid Peralta MD (12/11 2005)   No evidence of a filling defect in the visualized main pulmonary artery and   its major branches. No CT evidence of pulmonary embolism.       Bilateral lung interstitial opacities. Bilateral lower lobe bronchial wall   thickening, right  greater than left. Scattered right lung interstitial   opacities are noted.      Electronically Signed by: CHRISTINE RIVERA MD    Signed on: 12/11/2023 8:05 PM    Workstation ID: [id number]      XR CHEST PA AND LATERAL 2 VIEWS   Final Result by Luis Alberto Holley MD (12/11 1731)   Suspected early consolidation in the basilar right middle lobe.                  Electronically Signed by: LUIS ALBERTO HOLLEY MD    Signed on: 12/11/2023 5:31 PM    Workstation ID: [id number]      XR CHEST AP OR PA    (Results Pending)       ASSESSMENT     82 year old female with pMHx of DM2, HTN, HLD, spinal stenosis, and diverticulosis.presented in the ED on 12/11/23 after she was observed to be in respiratory distress during a virtual consultation with her PCP    Acute hypoxic respiratory failure   - secondary to RSV infection  - placed on BIPAP on admission; dyspnea resolved  - CTA chest neg PE  Sepsis without shock   Lactic acidosis - resolved   RSV infection     Emphysema, acute exacerbation    - seen on imaging but not officially diagnosed with COPD per patient   - (+) active smoker prior to hospitalization  - (+) wheezing 12/13    CKD 2  DM 2  HTN  Hyperlipidemia     CODE STATUS - full code     PLAN     - Continue O2 supplement; wean off as tolerated; keep Spo2 >90%  - Needs home O2 eval close to discharge  - BIPAP PRN for respiratory support  - Change PO steroid to IV due to extensive wheezing  - Start on duoneb tx round the clock   - Completed short course Azithromycin, continue Ceftriaxone; ID to follow  - DVT prophylaxis with Lovenox subcutaneous  - Needs pulmonary followup on discharge; needs PFT and optimization of COPD tx       Jostin Campo NP   [facility]  12/13/2023     I have spent greater than 34 min performing face-to-face patient care, including rendering the following critical care: Reviewed all the radiological studies available and their interpretation, reviewed  all the available  labwork and all the pertinent consultant's notes. I have also answered all the patient/relatives questions.Pt is critically ill as documented above and requires high complexity medical decision making and active titration of therapies to preserve life. Personally reviewed all pertinent images and labs.   Patient was seen and examined.  I participated in the following activities: reviewing data and medical decision making, and spent at least 50% of patient's care, reviewing of data, communication with patient, family and team, and development to plan of care.       None known

## 2024-01-01 NOTE — PROGRESS NOTE BEHAVIORAL HEALTH - NSBHFUPINTERVALCCFT_PSY_A_CORE
I am hopeless
Still dizzy
Admission Reconciliation is Completed  Discharge Reconciliation is Not Complete
Still dizzy
Admission Reconciliation is Completed  Discharge Reconciliation is Completed

## 2024-01-16 ENCOUNTER — APPOINTMENT (RX ONLY)
Dept: URBAN - METROPOLITAN AREA CLINIC 120 | Facility: CLINIC | Age: 69
Setting detail: DERMATOLOGY
End: 2024-01-16

## 2024-01-16 DIAGNOSIS — H02.83 DERMATOCHALASIS OF EYELID: ICD-10-CM

## 2024-01-16 DIAGNOSIS — L82.0 INFLAMED SEBORRHEIC KERATOSIS: ICD-10-CM

## 2024-01-16 DIAGNOSIS — L81.4 OTHER MELANIN HYPERPIGMENTATION: ICD-10-CM

## 2024-01-16 DIAGNOSIS — L82.1 OTHER SEBORRHEIC KERATOSIS: ICD-10-CM

## 2024-01-16 PROBLEM — H02.839 DERMATOCHALASIS OF UNSPECIFIED EYE, UNSPECIFIED EYELID: Status: ACTIVE | Noted: 2024-01-16

## 2024-01-16 PROBLEM — D48.5 NEOPLASM OF UNCERTAIN BEHAVIOR OF SKIN: Status: ACTIVE | Noted: 2024-01-16

## 2024-01-16 PROCEDURE — ? BIOPSY BY SHAVE METHOD

## 2024-01-16 PROCEDURE — ? DEFER

## 2024-01-16 PROCEDURE — ? COUNSELING

## 2024-01-16 PROCEDURE — 11102 TANGNTL BX SKIN SINGLE LES: CPT

## 2024-01-16 PROCEDURE — ? ADDITIONAL NOTES

## 2024-01-16 PROCEDURE — 99213 OFFICE O/P EST LOW 20 MIN: CPT | Mod: 25

## 2024-01-16 ASSESSMENT — LOCATION DETAILED DESCRIPTION DERM
LOCATION DETAILED: RIGHT ANTERIOR PROXIMAL THIGH
LOCATION DETAILED: LEFT ANTERIOR PROXIMAL THIGH
LOCATION DETAILED: LEFT DISTAL PRETIBIAL REGION
LOCATION DETAILED: RIGHT ANTERIOR DISTAL THIGH

## 2024-01-16 ASSESSMENT — LOCATION ZONE DERM: LOCATION ZONE: LEG

## 2024-01-16 ASSESSMENT — LOCATION SIMPLE DESCRIPTION DERM
LOCATION SIMPLE: LEFT PRETIBIAL REGION
LOCATION SIMPLE: RIGHT THIGH
LOCATION SIMPLE: LEFT THIGH

## 2024-01-16 NOTE — PROCEDURE: ADDITIONAL NOTES
Render Risk Assessment In Note?: no
Additional Notes: Patient was referred to a eye surgeon because she states it’s affecting her vision in both eyes.
Detail Level: Simple

## 2024-01-19 ENCOUNTER — APPOINTMENT (RX ONLY)
Dept: URBAN - METROPOLITAN AREA CLINIC 120 | Facility: CLINIC | Age: 69
Setting detail: DERMATOLOGY
End: 2024-01-19

## 2024-08-14 NOTE — PROGRESS NOTE BEHAVIORAL HEALTH - LANGUAGE
Abdomen , soft, nontender, nondistended , no guarding or rigidity , no masses palpable
No abnormalities noted

## 2025-01-21 NOTE — ED ADULT NURSE NOTE - BREATHING, MLM
Status: She is alert and oriented to person, place, and time.           DIFFERENTIAL DIAGNOSIS:       Jaydon reviewed the disposition diagnosis with the patient and or their family/guardian.  I have answered their questions and given discharge instructions.  They voiced understanding of these instructions and did not have anyfurther questions or complaints.      PROCEDURES:  Orders Placed This Encounter   Procedures    RESUP NPTERF WND BODY 2.6-7.5    Tdap, BOOSTRIX, (age 10 yrs+), IM     Anesthesia with 1% Lidocaine without Epinephrine. Wound cleansed, debrided of visible foreign material and necrotic tissue, and 5 simple sutures were placed.   Patient tolerated well.   Antibiotic ointment and dressing applied.    Wound care instructions provided.    Observe for any signs of infection or other problems.    Return for suture removal in 5-7 days.    No results found for this visit on 01/21/25.    FINALIMPRESSION      Visit Diagnoses and Associated Orders       Laceration of left ring finger without foreign body with damage to nail, initial encounter    -  Primary    RESUP NPTER WND BODY 2.6-7.5 [27801 CPT(R)]           ORDERS WITHOUT AN ASSOCIATED DIAGNOSIS    Tdap, BOOSTRIX, (age 10 yrs+), IM [89922 Custom]                PLAN     Return in about 1 week (around 1/28/2025) for suture removal .      DISCHARGEMEDICATIONS:  No orders of the defined types were placed in this encounter.        Plan:  Anesthesia with 1% Lidocaine without Epinephrine. Wound cleansed, debrided of visible foreign material and necrotic tissue, and 5 simple sutures were placed.   Patient tolerated well.   Antibiotic ointment and dressing applied.    Wound care instructions provided.    Observe for any signs of infection or other problems.    Return for suture removal in 5-7 days.  Patient instructed to return to the office if symptoms worsen, return, or have any other concerns.Patient understands and is agreeable.         Itzel George PA-C  Spontaneous, unlabored and symmetrical

## 2025-02-12 NOTE — ED BEHAVIORAL HEALTH ASSESSMENT NOTE - RISK ASSESSMENT
Discharged
Patient denies SI, has no history of SA, is sober, has protective factors against suicide. She is low risk for self harm.

## 2025-04-11 ENCOUNTER — APPOINTMENT (OUTPATIENT)
Dept: URBAN - METROPOLITAN AREA CLINIC 120 | Facility: CLINIC | Age: 70
Setting detail: DERMATOLOGY
End: 2025-04-11

## 2025-04-11 DIAGNOSIS — L84 CORNS AND CALLOSITIES: ICD-10-CM

## 2025-04-11 DIAGNOSIS — D22 MELANOCYTIC NEVI: ICD-10-CM

## 2025-04-11 DIAGNOSIS — L81.0 POSTINFLAMMATORY HYPERPIGMENTATION: ICD-10-CM

## 2025-04-11 DIAGNOSIS — L81.5 LEUKODERMA, NOT ELSEWHERE CLASSIFIED: ICD-10-CM

## 2025-04-11 DIAGNOSIS — L81.4 OTHER MELANIN HYPERPIGMENTATION: ICD-10-CM

## 2025-04-11 DIAGNOSIS — L82.1 OTHER SEBORRHEIC KERATOSIS: ICD-10-CM

## 2025-04-11 DIAGNOSIS — D18.0 HEMANGIOMA: ICD-10-CM

## 2025-04-11 PROBLEM — D18.01 HEMANGIOMA OF SKIN AND SUBCUTANEOUS TISSUE: Status: ACTIVE | Noted: 2025-04-11

## 2025-04-11 PROBLEM — D22.5 MELANOCYTIC NEVI OF TRUNK: Status: ACTIVE | Noted: 2025-04-11

## 2025-04-11 PROCEDURE — ? COUNSELING

## 2025-04-11 PROCEDURE — ? TREATMENT REGIMEN

## 2025-04-11 PROCEDURE — ? PRESCRIPTION MEDICATION MANAGEMENT

## 2025-04-11 PROCEDURE — 99213 OFFICE O/P EST LOW 20 MIN: CPT

## 2025-04-11 PROCEDURE — ? PRESCRIPTION

## 2025-04-11 RX ORDER — UREA 400 MG/G
CREAM TOPICAL
Qty: 85 | Refills: 5 | Status: ERX | COMMUNITY
Start: 2025-04-11

## 2025-04-11 RX ADMIN — UREA: 400 CREAM TOPICAL at 00:00

## 2025-04-11 ASSESSMENT — LOCATION SIMPLE DESCRIPTION DERM
LOCATION SIMPLE: RIGHT UPPER BACK
LOCATION SIMPLE: RIGHT GREAT TOE
LOCATION SIMPLE: LEFT GREAT TOE
LOCATION SIMPLE: CHEST
LOCATION SIMPLE: LEFT THIGH
LOCATION SIMPLE: RIGHT THIGH

## 2025-04-11 ASSESSMENT — LOCATION DETAILED DESCRIPTION DERM
LOCATION DETAILED: LEFT MEDIAL GREAT TOE
LOCATION DETAILED: RIGHT ANTERIOR DISTAL THIGH
LOCATION DETAILED: LEFT ANTERIOR PROXIMAL THIGH
LOCATION DETAILED: RIGHT SUPERIOR MEDIAL UPPER BACK
LOCATION DETAILED: LEFT MEDIAL SUPERIOR CHEST
LOCATION DETAILED: UPPER STERNUM
LOCATION DETAILED: RIGHT MEDIAL GREAT TOE
LOCATION DETAILED: LEFT ANTERIOR DISTAL THIGH

## 2025-04-11 ASSESSMENT — LOCATION ZONE DERM
LOCATION ZONE: TOE
LOCATION ZONE: LEG
LOCATION ZONE: TRUNK

## 2025-04-11 NOTE — PROCEDURE: PRESCRIPTION MEDICATION MANAGEMENT
Render In Strict Bullet Format?: Yes
Detail Level: Zone
Initiate Treatment: urea 40 % topical cream \\nApply on affected skin on thigh
Initiate Treatment: urea 40 % topical cream \\nApply on affected skin on feet bid